# Patient Record
Sex: MALE | Race: WHITE | Employment: FULL TIME | URBAN - METROPOLITAN AREA
[De-identification: names, ages, dates, MRNs, and addresses within clinical notes are randomized per-mention and may not be internally consistent; named-entity substitution may affect disease eponyms.]

---

## 2017-02-10 ENCOUNTER — OFFICE VISIT (OUTPATIENT)
Dept: FAMILY MEDICINE | Facility: CLINIC | Age: 62
End: 2017-02-10
Payer: COMMERCIAL

## 2017-02-10 VITALS
BODY MASS INDEX: 23.7 KG/M2 | HEART RATE: 53 BPM | TEMPERATURE: 98.3 F | DIASTOLIC BLOOD PRESSURE: 91 MMHG | WEIGHT: 175 LBS | SYSTOLIC BLOOD PRESSURE: 138 MMHG | HEIGHT: 72 IN | OXYGEN SATURATION: 99 %

## 2017-02-10 DIAGNOSIS — E78.5 HYPERLIPIDEMIA, UNSPECIFIED HYPERLIPIDEMIA TYPE: ICD-10-CM

## 2017-02-10 DIAGNOSIS — Z12.5 SCREENING FOR PROSTATE CANCER: ICD-10-CM

## 2017-02-10 DIAGNOSIS — Z01.818 PREOP GENERAL PHYSICAL EXAM: Primary | ICD-10-CM

## 2017-02-10 DIAGNOSIS — K92.2 LOWER GI BLEED: ICD-10-CM

## 2017-02-10 DIAGNOSIS — Z11.59 NEED FOR HEPATITIS C SCREENING TEST: ICD-10-CM

## 2017-02-10 DIAGNOSIS — I25.10 CORONARY ARTERY DISEASE INVOLVING NATIVE HEART WITHOUT ANGINA PECTORIS, UNSPECIFIED VESSEL OR LESION TYPE: ICD-10-CM

## 2017-02-10 LAB
ALBUMIN SERPL-MCNC: 4.1 G/DL (ref 3.4–5)
ALP SERPL-CCNC: 80 U/L (ref 40–150)
ALT SERPL W P-5'-P-CCNC: 34 U/L (ref 0–70)
ANION GAP SERPL CALCULATED.3IONS-SCNC: 4 MMOL/L (ref 3–14)
AST SERPL W P-5'-P-CCNC: 35 U/L (ref 0–45)
BASOPHILS # BLD AUTO: 0 10E9/L (ref 0–0.2)
BASOPHILS NFR BLD AUTO: 0.8 %
BILIRUB SERPL-MCNC: 0.7 MG/DL (ref 0.2–1.3)
BUN SERPL-MCNC: 11 MG/DL (ref 7–30)
CALCIUM SERPL-MCNC: 9.3 MG/DL (ref 8.5–10.1)
CHLORIDE SERPL-SCNC: 105 MMOL/L (ref 94–109)
CHOLEST SERPL-MCNC: 152 MG/DL
CO2 SERPL-SCNC: 32 MMOL/L (ref 20–32)
CREAT SERPL-MCNC: 0.86 MG/DL (ref 0.66–1.25)
DIFFERENTIAL METHOD BLD: NORMAL
EOSINOPHIL # BLD AUTO: 0.3 10E9/L (ref 0–0.7)
EOSINOPHIL NFR BLD AUTO: 7 %
ERYTHROCYTE [DISTWIDTH] IN BLOOD BY AUTOMATED COUNT: 14.1 % (ref 10–15)
GFR SERPL CREATININE-BSD FRML MDRD: NORMAL ML/MIN/1.7M2
GLUCOSE SERPL-MCNC: 91 MG/DL (ref 70–99)
HCT VFR BLD AUTO: 44.2 % (ref 40–53)
HDLC SERPL-MCNC: 63 MG/DL
HGB BLD-MCNC: 15 G/DL (ref 13.3–17.7)
LDLC SERPL CALC-MCNC: 75 MG/DL
LYMPHOCYTES # BLD AUTO: 0.9 10E9/L (ref 0.8–5.3)
LYMPHOCYTES NFR BLD AUTO: 24.1 %
MCH RBC QN AUTO: 31 PG (ref 26.5–33)
MCHC RBC AUTO-ENTMCNC: 33.9 G/DL (ref 31.5–36.5)
MCV RBC AUTO: 91 FL (ref 78–100)
MONOCYTES # BLD AUTO: 0.3 10E9/L (ref 0–1.3)
MONOCYTES NFR BLD AUTO: 8 %
NEUTROPHILS # BLD AUTO: 2.2 10E9/L (ref 1.6–8.3)
NEUTROPHILS NFR BLD AUTO: 60.1 %
NONHDLC SERPL-MCNC: 89 MG/DL
PLATELET # BLD AUTO: 176 10E9/L (ref 150–450)
POTASSIUM SERPL-SCNC: 4.7 MMOL/L (ref 3.4–5.3)
PROT SERPL-MCNC: 7.4 G/DL (ref 6.8–8.8)
PSA SERPL-ACNC: 1.11 UG/L (ref 0–4)
RBC # BLD AUTO: 4.84 10E12/L (ref 4.4–5.9)
SODIUM SERPL-SCNC: 141 MMOL/L (ref 133–144)
TRIGL SERPL-MCNC: 72 MG/DL
WBC # BLD AUTO: 3.7 10E9/L (ref 4–11)

## 2017-02-10 PROCEDURE — 99213 OFFICE O/P EST LOW 20 MIN: CPT | Performed by: INTERNAL MEDICINE

## 2017-02-10 PROCEDURE — 80061 LIPID PANEL: CPT | Performed by: INTERNAL MEDICINE

## 2017-02-10 PROCEDURE — 80053 COMPREHEN METABOLIC PANEL: CPT | Performed by: INTERNAL MEDICINE

## 2017-02-10 PROCEDURE — G0103 PSA SCREENING: HCPCS | Performed by: INTERNAL MEDICINE

## 2017-02-10 PROCEDURE — 93000 ELECTROCARDIOGRAM COMPLETE: CPT | Performed by: INTERNAL MEDICINE

## 2017-02-10 PROCEDURE — 85027 COMPLETE CBC AUTOMATED: CPT | Performed by: INTERNAL MEDICINE

## 2017-02-10 PROCEDURE — 36415 COLL VENOUS BLD VENIPUNCTURE: CPT | Performed by: INTERNAL MEDICINE

## 2017-02-10 NOTE — PATIENT INSTRUCTIONS
Before Your Surgery      Call your surgeon if there is any change in your health. This includes signs of a cold or flu (such as a sore throat, runny nose, cough, rash or fever).    Do not smoke, drink alcohol or take over the counter medicine (unless your surgeon or primary care doctor tells you to) for the 24 hours before and after surgery.    If you take prescribed drugs: Follow your doctor s orders about which medicines to take and which to stop until after surgery.    Eating and drinking prior to surgery: follow the instructions from your surgeon    Take a shower or bath the night before surgery. Use the soap your surgeon gave you to gently clean your skin. If you do not have soap from your surgeon, use your regular soap. Do not shave or scrub the surgery site.  Wear clean pajamas and have clean sheets on your bed.     (Z01.818) Preop general physical exam  (primary encounter diagnosis)  Comment: you are medically optimized for your surgery pending EKG and labs.  I would recommend holding aspirin for 1 week prior to surgery.  Continue all other medications as you normally would  Plan: CBC with platelets, Comprehensive metabolic         panel, EKG 12-lead complete w/read - Clinics            (I25.10) Coronary artery disease involving native heart without angina pectoris, unspecified vessel or lesion type  Comment: stable - follow up in cardiology with Dr. Perez  Plan:     (E78.5) Hyperlipidemia, unspecified hyperlipidemia type  Comment: Check fasting lipid panel when you are able  Plan: Lipid panel reflex to direct LDL            (K92.2) Lower GI bleed  Comment: No issues - hold aspirin for 1 week prior to surgery  Plan:     (Z12.5) Screening for prostate cancer  Comment:   Plan: Prostate spec antigen screen

## 2017-02-10 NOTE — MR AVS SNAPSHOT
After Visit Summary   2/10/2017    Odessa Elliott    MRN: [id number]           Patient Information     Date Of Birth          1955        Visit Information        Provider Department      2/10/2017 8:00 AM Mikie Lazaro MD [facility] [place]        Today's Diagnoses     Preop general physical exam    -  1     Coronary artery disease involving native heart without angina pectoris, unspecified vessel or lesion type         Hyperlipidemia, unspecified hyperlipidemia type         Lower GI bleed         Screening for prostate cancer           Care Instructions      Before Your Surgery      Call your surgeon if there is any change in your health. This includes signs of a cold or flu (such as a sore throat, runny nose, cough, rash or fever).    Do not smoke, drink alcohol or take over the counter medicine (unless your surgeon or primary care doctor tells you to) for the 24 hours before and after surgery.    If you take prescribed drugs: Follow your doctor s orders about which medicines to take and which to stop until after surgery.    Eating and drinking prior to surgery: follow the instructions from your surgeon    Take a shower or bath the night before surgery. Use the soap your surgeon gave you to gently clean your skin. If you do not have soap from your surgeon, use your regular soap. Do not shave or scrub the surgery site.  Wear clean pajamas and have clean sheets on your bed.     (Z01.818) Preop general physical exam  (primary encounter diagnosis)  Comment: you are medically optimized for your surgery pending EKG and labs.  I would recommend holding aspirin for 1 week prior to surgery.  Continue all other medications as you normally would  Plan: CBC with platelets, Comprehensive metabolic         panel, EKG 12-lead complete w/read - Clinics            (I25.10) Coronary artery disease involving native heart without angina pectoris, unspecified vessel or lesion type  Comment: stable -  "follow up in cardiology with Dr. Perez  Plan:     (E78.5) Hyperlipidemia, unspecified hyperlipidemia type  Comment: Check fasting lipid panel when you are able  Plan: Lipid panel reflex to direct LDL            (K92.2) Lower GI bleed  Comment: No issues - hold aspirin for 1 week prior to surgery  Plan:     (Z12.5) Screening for prostate cancer  Comment:   Plan: Prostate spec antigen screen                    Follow-ups after your visit        Who to contact     If you have questions or need follow up information about today's clinic visit or your schedule please contact [facility] directly at [phone].  Normal or non-critical lab and imaging results will be communicated to you by Lifesumhart, letter or phone within 4 business days after the clinic has received the results. If you do not hear from us within 7 days, please contact the clinic through avocadostoret or phone. If you have a critical or abnormal lab result, we will notify you by phone as soon as possible.  Submit refill requests through [employer] or call your pharmacy and they will forward the refill request to us. Please allow 3 business days for your refill to be completed.          Additional Information About Your Visit        MyChart Information     [employer] gives you secure access to your electronic health record. If you see a primary care provider, you can also send messages to your care team and make appointments. If you have questions, please call your primary care clinic.  If you do not have a primary care provider, please call [phone] and they will assist you.        Care EveryWhere ID     This is your Care EveryWhere ID. This could be used by other organizations to access your [facility] medical records  [id number]        Your Vitals Were     Pulse Temperature Height BMI (Body Mass Index) Pulse Oximetry       53 98.3  F (36.8  C) (Oral) 5' 11.5\" (1.816 m) 24.07 kg/m2 99%        Blood Pressure from Last 3 Encounters:   02/10/17 138/91 "   07/11/16 128/88   07/15/15 134/89    Weight from Last 3 Encounters:   02/10/17 175 lb (79.379 kg)   07/11/16 173 lb 14.4 oz (78.881 kg)   07/15/15 174 lb 8 oz (79.153 kg)              We Performed the Following     CBC with platelets     Comprehensive metabolic panel     EKG 12-lead complete w/read - Clinics     Lipid panel reflex to direct LDL     Prostate spec antigen screen        Primary Care Provider Office Phone # Fax #    Mikie Lazaro [phone]       [facility] [address]   [place] [address]        Thank you!     Thank you for choosing [facility]  for your care. Our goal is always to provide you with excellent care. Hearing back from our patients is one way we can continue to improve our services. Please take a few minutes to complete the written survey that you may receive in the mail after your visit with us. Thank you!             Your Updated Medication List - Protect others around you: Learn how to safely use, store and throw away your medicines at www.disposemymeds.org.          This list is accurate as of: 2/10/17  8:12 AM.  Always use your most recent med list.                   Brand Name Dispense Instructions for use    aspirin 81 MG tablet      Take 81 mg by mouth daily       atorvastatin 80 MG tablet    LIPITOR    90 tablet    Take 1 tablet (80 mg) by mouth daily       magnesium 250 MG tablet      Take 1 tablet by mouth daily       metoprolol 25 MG 24 hr tablet    TOPROL-XL    45 tablet    Take 0.5 tablets (12.5 mg) by mouth daily       MULTI VITAMIN DAILY PO          Vitamin D (Cholecalciferol) 1000 UNITS Tabs      Take 2,000 Units by mouth

## 2017-02-10 NOTE — PROGRESS NOTES
[place] [address] [address] [facility] [address]  [phone]  Dept: [phone]    PRE-OP EVALUATION:  Today's date: 2/10/2017    Odessa Elliott (: 1955) presents for pre-operative evaluation assessment as requested by Dr. Briscoe.  He requires evaluation and anesthesia risk assessment prior to undergoing surgery/procedure for treatment of Pinky finger .  Proposed procedure:     Date of Surgery/ Procedure: 3/6/17  Time of Surgery/ Procedure: 6a  Hospital/Surgical Facility: [facility]  [phone]  Primary Physician: Mikie Lazaro  Type of Anesthesia Anticipated: Local with MAC    Patient has a Health Care Directive or Living Will:  NO    1. yes - Do you have a history of heart attack, stroke, stent, bypass or surgery on an artery in the head, neck, heart or legs? - history of cabg   2. NO - Do you ever have any pain or discomfort in your chest?  3. NO - Do you have a history of  Heart Failure?  4. NO - Are you troubled by shortness of breath when: walking on the level, up a slight hill or at night?  5. NO - Do you currently have a cold, bronchitis or other respiratory infection?  6. NO - Do you have a cough, shortness of breath or wheezing?  7. NO - Do you sometimes get pains in the calves of your legs when you walk?  8. NO - Do you or anyone in your family have previous history of blood clots?  9. NO - Do you or does anyone in your family have a serious bleeding problem such as prolonged bleeding following surgeries or cuts?  10. NO - Have you ever had problems with anemia or been told to take iron pills?  11. NO - Have you had any abnormal blood loss such as black, tarry or bloody stools, or abnormal vaginal bleeding?  12. NO - Have you ever had a blood transfusion?  13. NO - Have you or any of your relatives ever had problems with anesthesia?  14. NO - Do you have sleep apnea, excessive snoring or daytime drowsiness?  15. NO - Do you have any prosthetic heart valves?  16. NO -  Do you have prosthetic joints?  17. NO - Is there any chance that you may be pregnant?      HPI:                                                      Brief HPI related to upcoming procedure: Finger injury      See problem list for active medical problems.  Problems all longstanding and stable, except as noted/documented.  See ROS for pertinent symptoms related to these conditions.                                                                                                  .    MEDICAL HISTORY:                                                      Patient Active Problem List    Diagnosis Date Noted     Acromioclavicular sprain 06/09/2014     Priority: Medium     Lower GI bleed 09/30/2013     Priority: Medium     S/P CABG x 5 09/27/2013     Priority: Medium     Coronary artery disease 09/23/2013     Priority: Medium     5 v. CABG 9/13       Advanced directives, counseling/discussion 08/31/2012     Priority: Medium     Discussed advance care planning with patient; information given to patient to review. 8/31/2012   GARRET Salmon  9/5/12 Mailed pt informational letter regarding the [employer] for the development of an Advance Care Directive. Chayito Sheridan RN               Hearing loss 11/29/2007     Priority: Medium     Problem list name updated by automated process. Provider to review       Hyperlipidemia 11/29/2007     Priority: Medium     Problem list name updated by automated process. Provider to review        Past Medical History   Diagnosis Date     Chest pain      Hyperlipidaemia      Coronary artery disease      s/p 5v CABG     Urinary retention      Shortness of breath      Past Surgical History   Procedure Laterality Date     No history of surgery       Orthopedic surgery       cortisone shot in back for pain     Bypass graft artery coronary  9/23/2013     Procedure: BYPASS GRAFT ARTERY CORONARY;  CORONARY ARTERY BYPASS GRAFT X 5  WITH ENDOSCOPIC VEIN HARVESTING (LAD, PDA, SIMRAN, OM1 AND OM2  ");  Surgeon: Kwadwo Canales MD;  Location:  OR     Coronary artery bypass  2013     LIMA =LAD,svg=PDA,Svg=PLRca, Svg=Om1,Svg=OM2     Current Outpatient Prescriptions   Medication Sig Dispense Refill     Vitamin D, Cholecalciferol, 1000 UNITS TABS Take 2,000 Units by mouth       magnesium 250 MG tablet Take 1 tablet by mouth daily       metoprolol (TOPROL-XL) 25 MG 24 hr tablet Take 0.5 tablets (12.5 mg) by mouth daily 45 tablet 3     atorvastatin (LIPITOR) 80 MG tablet Take 1 tablet (80 mg) by mouth daily 90 tablet 2     Multiple Vitamin (MULTI VITAMIN DAILY PO)        aspirin 81 MG tablet Take 81 mg by mouth daily       OTC products: None, except as noted above    No Known Allergies   Latex Allergy: NO    Social History   Substance Use Topics     Smoking status: Never Smoker      Smokeless tobacco: Never Used     Alcohol Use: 3.6 oz/week     6 Cans of beer per week      Comment: 4 glasss wine per week     History   Drug Use No       REVIEW OF SYSTEMS:                                                    C: NEGATIVE for fever, chills, change in weight  E/M: NEGATIVE for ear, mouth and throat problems  R: NEGATIVE for significant cough or SOB  CV: NEGATIVE for chest pain, palpitations or peripheral edema    EXAM:                                                    /91 mmHg  Pulse 53  Temp(Src) 98.3  F (36.8  C) (Oral)  Ht 5' 11.5\" (1.816 m)  Wt 175 lb (79.379 kg)  BMI 24.07 kg/m2  SpO2 99%    GENERAL APPEARANCE: healthy, alert and no distress     EYES: EOMI, - PERRL     HENT: ear canals and TM's normal and nose and mouth without ulcers or lesions     NECK: no adenopathy, no asymmetry, masses, or scars and thyroid normal to palpation     RESP: lungs clear to auscultation - no rales, rhonchi or wheezes     CV: regular rates and rhythm, normal S1 S2, no S3 or S4 and no murmur, click or rub -     ABDOMEN:  soft, nontender, no HSM or masses and bowel sounds normal     MS: extremities normal- no gross deformities " noted, no evidence of inflammation in joints, FROM in all extremities.-= contracture noted     SKIN: no suspicious lesions or rashes     NEURO: Normal strength and tone, sensory exam grossly normal, mentation intact and speech normal     PSYCH: mentation appears normal. and affect normal/bright     LYMPHATICS: No axillary, cervical, inguinal, or supraclavicular nodes    DIAGNOSTICS:                                                      EKG: appears normal, NSR, normal axis, normal intervals, no acute ST/T changes c/w ischemia, no LVH by voltage criteria, unchanged from previous tracings  Labs Resulted Today:   Results for orders placed or performed in visit on 02/10/17   CBC with platelets   Result Value Ref Range    WBC 3.7 (L) 4.0 - 11.0 10e9/L    RBC Count 4.84 4.4 - 5.9 10e12/L    Hemoglobin 15.0 13.3 - 17.7 g/dL    Hematocrit 44.2 40.0 - 53.0 %    MCV 91 78 - 100 fl    MCH 31.0 26.5 - 33.0 pg    MCHC 33.9 31.5 - 36.5 g/dL    RDW 14.1 10.0 - 15.0 %    Platelet Count 176 150 - 450 10e9/L   Lipid panel reflex to direct LDL   Result Value Ref Range    Cholesterol 152 <200 mg/dL    Triglycerides 72 <150 mg/dL    HDL Cholesterol 63 >39 mg/dL    LDL Cholesterol Calculated 75 <100 mg/dL    Non HDL Cholesterol 89 <130 mg/dL   Comprehensive metabolic panel   Result Value Ref Range    Sodium 141 133 - 144 mmol/L    Potassium 4.7 3.4 - 5.3 mmol/L    Chloride 105 94 - 109 mmol/L    Carbon Dioxide 32 20 - 32 mmol/L    Anion Gap 4 3 - 14 mmol/L    Glucose 91 70 - 99 mg/dL    Urea Nitrogen 11 7 - 30 mg/dL    Creatinine 0.86 0.66 - 1.25 mg/dL    GFR Estimate >90  Non  GFR Calc   >60 mL/min/1.7m2    GFR Estimate If Black >90   GFR Calc   >60 mL/min/1.7m2    Calcium 9.3 8.5 - 10.1 mg/dL    Bilirubin Total 0.7 0.2 - 1.3 mg/dL    Albumin 4.1 3.4 - 5.0 g/dL    Protein Total 7.4 6.8 - 8.8 g/dL    Alkaline Phosphatase 80 40 - 150 U/L    ALT 34 0 - 70 U/L    AST 35 0 - 45 U/L   Prostate spec antigen  screen   Result Value Ref Range    PSA 1.11 0 - 4 ug/L   Differential   Result Value Ref Range    Diff Method Automated Method     % Neutrophils 60.1 %    % Lymphocytes 24.1 %    % Monocytes 8.0 %    % Eosinophils 7.0 %    % Basophils 0.8 %    Absolute Neutrophil 2.2 1.6 - 8.3 10e9/L    Absolute Lymphocytes 0.9 0.8 - 5.3 10e9/L    Absolute Monocytes 0.3 0.0 - 1.3 10e9/L    Absolute Eosinophils 0.3 0.0 - 0.7 10e9/L    Absolute Basophils 0.0 0.0 - 0.2 10e9/L     Labs Drawn and in Process:   Unresulted Labs Ordered in the Past 30 Days of this Admission     No orders found from 12/13/2016 to 2/11/2017.          Recent Labs   Lab Test  07/15/15   0947  06/04/14   1259  06/02/14   0817  10/02/13   0632   10/01/13   0750  09/30/13   1200   09/25/13   0515   09/23/13   2045   09/19/13   1350   HGB  15.7   --    --   10.0*   < >  10.2*  11.2*   < >  9.3*   < >  13.6   < >   --    PLT  201   --    --    --    --   295  331   < >  99*   < >  147*   < >   --    INR   --    --    --    --    --    --   1.06   --   1.22*   < >  1.23*   < >   --    NA  139   --   140   --    --    --   134   < >  136   < >  133   < >   --    POTASSIUM  4.7  5.0  5.4*   --    --    --   4.4   < >  4.0   < >  5.1   < >   --    CR  0.87   --   0.90   --    --    --   0.86   < >  0.71   < >  0.75   --    --    A1C   --    --    --    --    --    --    --    --    --    --   4.6   --   4.7    < > = values in this interval not displayed.        IMPRESSION:                                                    Reason for surgery/procedure: Dupuytren contracture  Diagnosis/reason for consult: Pre-operative evaluation     The proposed surgical procedure is considered INTERMEDIATE risk.    REVISED CARDIAC RISK INDEX  The patient has the following serious cardiovascular risks for perioperative complications such as (MI, PE, VFib and 3  AV Block):  No serious cardiac risks  INTERPRETATION: 0 risks: Class I (very low risk - 0.4% complication rate)    The patient  has the following additional risks for perioperative complications:  No identified additional risks    No diagnosis found.    RECOMMENDATIONS:                                                      (Z01.818) Preop general physical exam  (primary encounter diagnosis)  Comment: you are medically optimized for your surgery pending EKG and labs.  I would recommend holding aspirin for 1 week prior to surgery.  Continue all other medications as you normally would  Plan: CBC with platelets, Comprehensive metabolic         panel, EKG 12-lead complete w/read - Clinics            (I25.10) Coronary artery disease involving native heart without angina pectoris, unspecified vessel or lesion type  Comment: stable - follow up in cardiology with Dr. Perez  Plan:     (E78.5) Hyperlipidemia, unspecified hyperlipidemia type  Comment: Check fasting lipid panel when you are able  Plan: Lipid panel reflex to direct LDL            (K92.2) Lower GI bleed  Comment: No issues - hold aspirin for 1 week prior to surgery  Plan:     (Z12.5) Screening for prostate cancer  Comment:   Plan: Prostate spec antigen screen                       Signed Electronically by: Mikie Lazaro MD, MD    Copy of this evaluation report is provided to requesting physician.    [facility] Preop Guidelines

## 2017-02-10 NOTE — NURSING NOTE
"Chief Complaint   Patient presents with     Pre-Op Exam       Initial /91 mmHg  Pulse 53  Temp(Src) 98.3  F (36.8  C) (Oral)  Ht 5' 11.5\" (1.816 m)  Wt 175 lb (79.379 kg)  BMI 24.07 kg/m2  SpO2 99% Estimated body mass index is 24.07 kg/(m^2) as calculated from the following:    Height as of this encounter: 5' 11.5\" (1.816 m).    Weight as of this encounter: 175 lb (79.379 kg).  Medication Reconciliation: complete   Anna BLACKMAN CMA      "

## 2017-02-12 NOTE — PROGRESS NOTES
Vito Saxena,    I had the opportunity to review your recent labs and a summary of your labs reads as follows:    Your complete blood counts show no sign of anemia, and platelets, but again there was a low total white blood cell count.  However, the differential of your white blood cell count shows stable counts for all cell lines which we can continue to monitor  Your comprehensive metabolic panel showed normal renal function, normal liver function, and normal fasting blood glucose indicating no evidence of diabetes mellitus.  Your fasting lipid panel show  - normal HDL (good) cholesterol -as your goal is greater than 40  - low LDL (bad) cholesterol as your goal is less than 100 - continue statin   - normal triglyceride levels  Your PSA level is also stable indicating no evidence of prostate cancer    Congratulaions on your excellent results       Sincerely,  Mikie Lazaro MD

## 2017-03-06 ENCOUNTER — TELEPHONE (OUTPATIENT)
Dept: FAMILY MEDICINE | Facility: CLINIC | Age: 62
End: 2017-03-06

## 2017-03-06 NOTE — TELEPHONE ENCOUNTER
Reason for Call:  Other fax    Detailed comments: pre-op needs to be faxed ASAP to [facility]  Fax # [phone] Attn:  Lisset  Pt is being prepped right now for surgery.    Phone Number Patient can be reached at: [phone]    Best Time: anytime    Can we leave a detailed message on this number? YES    Call taken on 3/6/2017 at 12:35 PM by Yasmine Philip

## 2017-07-05 DIAGNOSIS — I25.10 CORONARY ARTERY DISEASE INVOLVING NATIVE CORONARY ARTERY OF NATIVE HEART WITHOUT ANGINA PECTORIS: ICD-10-CM

## 2017-07-05 RX ORDER — ATORVASTATIN CALCIUM 80 MG/1
80 TABLET, FILM COATED ORAL DAILY
Qty: 90 TABLET | Refills: 0 | Status: SHIPPED | OUTPATIENT
Start: 2017-07-05 | End: 2017-10-12

## 2017-08-11 ENCOUNTER — OFFICE VISIT (OUTPATIENT)
Dept: CARDIOLOGY | Facility: CLINIC | Age: 62
End: 2017-08-11
Attending: INTERNAL MEDICINE
Payer: COMMERCIAL

## 2017-08-11 VITALS
DIASTOLIC BLOOD PRESSURE: 85 MMHG | WEIGHT: 176 LBS | SYSTOLIC BLOOD PRESSURE: 126 MMHG | BODY MASS INDEX: 24.64 KG/M2 | HEART RATE: 51 BPM | HEIGHT: 71 IN

## 2017-08-11 DIAGNOSIS — Z95.1 S/P CABG X 5: Primary | ICD-10-CM

## 2017-08-11 DIAGNOSIS — I25.10 CORONARY ARTERY DISEASE INVOLVING NATIVE CORONARY ARTERY OF NATIVE HEART WITHOUT ANGINA PECTORIS: ICD-10-CM

## 2017-08-11 PROCEDURE — 99213 OFFICE O/P EST LOW 20 MIN: CPT | Performed by: INTERNAL MEDICINE

## 2017-08-11 NOTE — MR AVS SNAPSHOT
After Visit Summary   8/11/2017    Odessa Elliott    MRN: [id number]           Patient Information     Date Of Birth          1955        Visit Information        Provider Department      8/11/2017 7:45 AM Armani Perez MD [facility]        Today's Diagnoses     S/P CABG x 5    -  1    Coronary artery disease involving native coronary artery of native heart without angina pectoris           Follow-ups after your visit        Additional Services     Follow-Up with Cardiologist                 Future tests that were ordered for you today     Open Future Orders        Priority Expected Expires Ordered    Follow-Up with Cardiologist Routine 8/11/2018 12/24/2018 8/11/2017            Who to contact     If you have questions or need follow up information about today's clinic visit or your schedule please contact [facility] directly at [phone].  Normal or non-critical lab and imaging results will be communicated to you by [email or web address], letter or phone within 4 business days after the clinic has received the results. If you do not hear from us within 7 days, please contact the clinic through [email or web address] or phone. If you have a critical or abnormal lab result, we will notify you by phone as soon as possible.  Submit refill requests through ikaSystems or call your pharmacy and they will forward the refill request to us. Please allow 3 business days for your refill to be completed.          Additional Information About Your Visit        MyChart Information     ikaSystems gives you secure access to your electronic health record. If you see a primary care provider, you can also send messages to your care team and make appointments. If you have questions, please call your primary care clinic.  If you do not have a primary care provider, please call [phone] and they will assist you.        Care EveryWhere ID     This is your Care  "EveryWhere ID. This could be used by other organizations to access your [place] medical records  [id number]        Your Vitals Were     Pulse Height BMI (Body Mass Index)             51 1.803 m (5' 11\") 24.55 kg/m2          Blood Pressure from Last 3 Encounters:   08/11/17 126/85   02/10/17 (!) 138/91   07/11/16 128/88    Weight from Last 3 Encounters:   08/11/17 79.8 kg (176 lb)   02/10/17 79.4 kg (175 lb)   07/11/16 78.9 kg (173 lb 14.4 oz)              We Performed the Following     Follow-Up with Cardiologist          Today's Medication Changes          These changes are accurate as of: 8/11/17  8:17 AM.  If you have any questions, ask your nurse or doctor.               Stop taking these medicines if you haven't already. Please contact your care team if you have questions.     magnesium 250 MG tablet   Stopped by:  Armani Perez MD                    Primary Care Provider Office Phone # Fax #    Mikie Lazaro [phone] [address]  [facility] [address]        Equal Access to Services     [facility] : Hadii martir Banda, waivonneda abdifatah, qaellista kaalmada jose, antwon christian. So [facility] [phone].    ATENCIÓN: Si habla español, tiene a rivero disposición servicios gratuitos de asistencia lingüística. [facility] [phone].    We comply with applicable federal civil rights laws and [place] laws. We do not discriminate on the basis of race, color, national origin, age, disability sex, sexual orientation or gender identity.            Thank you!     Thank you for choosing [facility] [place]  for your care. Our goal is always to provide you with excellent care. Hearing back from our patients is one way we can continue to improve our services. Please take a few minutes to complete the written survey that you may receive in the mail after your visit with us. Thank you!             Your Updated " Medication List - Protect others around you: Learn how to safely use, store and throw away your medicines at www.disposemymeds.org.          This list is accurate as of: 8/11/17  8:17 AM.  Always use your most recent med list.                   Brand Name Dispense Instructions for use Diagnosis    aspirin 81 MG tablet      Take 81 mg by mouth daily        atorvastatin 80 MG tablet    LIPITOR    90 tablet    Take 1 tablet (80 mg) by mouth daily    Coronary artery disease involving native coronary artery of native heart without angina pectoris       CALCIUM 600 PO      Take 1 tablet by mouth daily        CoQ-10 100 MG Caps      Take 1 tablet by mouth daily        metoprolol 25 MG 24 hr tablet    TOPROL-XL    45 tablet    Take 0.5 tablets (12.5 mg) by mouth daily    Coronary artery disease involving native coronary artery of native heart without angina pectoris       MULTI VITAMIN DAILY PO           Vitamin D (Cholecalciferol) 1000 UNITS Tabs      Take 1,000 Units by mouth

## 2017-08-11 NOTE — PROGRESS NOTES
HISTORY OF PRESENT ILLNESS:  The patient is a very pleasant 62-year-old gentleman with known coronary artery disease and hyperlipidemia.  He underwent coronary artery bypass grafting x5 in 09/2013.  At that time he had LIMA to the LAD, vein graft to the right posterior descending artery, vein graft to the posterior lateral segment, vein graft to obtuse marginal branch 1 and a vein graft to obtuse marginal branch 2.  He has done well since the surgery.  I last saw him 1 year ago for followup.     He continues to stay active and denies any overwhelming cardiopulmonary symptoms. He denies chest pain or exertional shortness of breath.  He also denies any heart failure symptoms, including orthopnea, PND or lower extremity edema.      IMPRESSION AND PLAN:   1.  Coronary artery disease status post CABG x5 in 09/2013.   2.  Hyperlipidemia.      He continues to do well from coronary artery disease standpoint. No chest pains or exertional shortness of breath over the past year. He remains on a good medical program, including aspirin, metoprolol and Lipitor. His most recent lipid panel showed an LDL of 75. His other risk factors are well controlled.     I will see him in approximately a year for follow up but sooner if he develops any symptoms.  It was a pleasure seeing Mr. Elliott in the cardiovascular clinic this morning.         Armani Perez MD      Orders Placed This Encounter   Procedures     Follow-Up with Cardiologist       Orders Placed This Encounter   Medications     Vitamin D, Cholecalciferol, 1000 UNITS TABS     Sig: Take 2,000 Units by mouth     magnesium 250 MG tablet     Sig: Take 1 tablet by mouth daily     metoprolol (TOPROL-XL) 25 MG 24 hr tablet     Sig: Take 0.5 tablets (12.5 mg) by mouth daily     Dispense:  45 tablet     Refill:  3     atorvastatin (LIPITOR) 80 MG tablet     Sig: Take 1 tablet (80 mg) by mouth daily     Dispense:  90 tablet     Refill:  2       Medications Discontinued During This Encounter    Medication Reason     metoprolol (TOPROL-XL) 25 MG 24 hr tablet Reorder     atorvastatin (LIPITOR) 80 MG tablet Reorder         Encounter Diagnoses   Name Primary?     Coronary artery disease involving native coronary artery of native heart without angina pectoris Yes     S/P CABG (coronary artery bypass graft)      Mixed hyperlipidemia        CURRENT MEDICATIONS:  Current Outpatient Prescriptions   Medication Sig Dispense Refill     Vitamin D, Cholecalciferol, 1000 UNITS TABS Take 2,000 Units by mouth       magnesium 250 MG tablet Take 1 tablet by mouth daily       metoprolol (TOPROL-XL) 25 MG 24 hr tablet Take 0.5 tablets (12.5 mg) by mouth daily 45 tablet 3     atorvastatin (LIPITOR) 80 MG tablet Take 1 tablet (80 mg) by mouth daily 90 tablet 2     Multiple Vitamin (MULTI VITAMIN DAILY PO)        aspirin 81 MG tablet Take 81 mg by mouth daily         ALLERGIES   No Known Allergies    PAST MEDICAL HISTORY:  Past Medical History   Diagnosis Date     Chest pain      Hyperlipidaemia      Coronary artery disease      s/p 5v CABG     Urinary retention      Shortness of breath        PAST SURGICAL HISTORY:  Past Surgical History   Procedure Laterality Date     No history of surgery       Orthopedic surgery       cortisone shot in back for pain     Bypass graft artery coronary  9/23/2013     Procedure: BYPASS GRAFT ARTERY CORONARY;  CORONARY ARTERY BYPASS GRAFT X 5  WITH ENDOSCOPIC VEIN HARVESTING (LAD, PDA, SIMRAN, OM1 AND OM2 );  Surgeon: Kwadwo Canales MD;  Location:  OR     Coronary artery bypass  2013     LIMA =LAD,svg=PDA,Svg=PLRca, Svg=Om1,Svg=OM2       FAMILY HISTORY:  Family History   Problem Relation Age of Onset     Lipids Mother      C.A.D. No family hx of      Hypertension No family hx of      Cerebrovascular Accident No family hx of      Cancer Paternal Grandmother        SOCIAL HISTORY:  History     Social History     Marital Status:      Spouse Name: N/A     Number of Children: N/A     Years of  "Education: N/A     Social History Main Topics     Smoking status: Never Smoker      Smokeless tobacco: Never Used     Alcohol Use: 3.6 oz/week     6 Cans of beer per week      Comment: 4 glasss wine per week     Drug Use: No     Sexual Activity: Yes     Other Topics Concern     Caffeine Concern Yes     4 cups coffee daily     Sleep Concern No     Exercise Yes     daily walking, biking, golf      Seat Belt Yes     Parent/Sibling W/ Cabg, Mi Or Angioplasty Before 65f 55m? No     Social History Narrative    , 1 daughter    Employed for [employer]       Review of Systems:  Skin:  Negative       Eyes:  Positive for glasses;contacts    ENT:  Negative      Respiratory:  Negative       Cardiovascular:  Negative  (sharp chest pain occ)    Gastroenterology: Negative      Genitourinary:  Positive for nocturia    Musculoskeletal:  Negative      Neurologic:  Negative      Psychiatric:  Negative      Heme/Lymph/Imm:  Negative      Endocrine:  Negative        Physical Exam:  Vitals: /88 mmHg  Pulse 62  Ht 1.816 m (5' 11.5\")  Wt 78.881 kg (173 lb 14.4 oz)  BMI 23.92 kg/m2    Constitutional:  cooperative, alert and oriented, well developed, well nourished, in no acute distress        Skin:    surgical scars well-healed      Head:  normocephalic;no masses or lesions        Eyes:           ENT:  dentition good;no pallor or cyanosis        Neck:  carotid pulses are full and equal bilaterally        Chest:  clear to auscultation          Cardiac: regular rhythm, normal S1/S2, no S3 or S4, apical impulse not displaced, no murmurs, gallops or rubs                  Abdomen:  no masses;abdomen soft;non-tender        Vascular: pulses full and equal, no bruits auscultated                                        Extremities and Back:  no deformities, clubbing, cyanosis, erythema observed              Neurological:  no gross motor deficits              CC  Mikie Lazaro MD  [facility] " [facility]  [address] [place] [address]                  Orders Placed This Encounter   Procedures     Follow-Up with Cardiologist       Orders Placed This Encounter   Medications     Coenzyme Q10 (COQ-10) 100 MG CAPS     Sig: Take 1 tablet by mouth daily     Calcium Carbonate (CALCIUM 600 PO)     Sig: Take 1 tablet by mouth daily       Medications Discontinued During This Encounter   Medication Reason     magnesium 250 MG tablet Alternate therapy         Encounter Diagnoses   Name Primary?     Coronary artery disease involving native coronary artery of native heart without angina pectoris      S/P CABG x 5 Yes       CURRENT MEDICATIONS:  Current Outpatient Prescriptions   Medication Sig Dispense Refill     Coenzyme Q10 (COQ-10) 100 MG CAPS Take 1 tablet by mouth daily       Calcium Carbonate (CALCIUM 600 PO) Take 1 tablet by mouth daily       atorvastatin (LIPITOR) 80 MG tablet Take 1 tablet (80 mg) by mouth daily 90 tablet 0     Vitamin D, Cholecalciferol, 1000 UNITS TABS Take 1,000 Units by mouth        metoprolol (TOPROL-XL) 25 MG 24 hr tablet Take 0.5 tablets (12.5 mg) by mouth daily 45 tablet 3     Multiple Vitamin (MULTI VITAMIN DAILY PO)        aspirin 81 MG tablet Take 81 mg by mouth daily         ALLERGIES   No Known Allergies    PAST MEDICAL HISTORY:  Past Medical History:   Diagnosis Date     Chest pain      Coronary artery disease     s/p 5v CABG     Hyperlipidaemia      Shortness of breath      Urinary retention        PAST SURGICAL HISTORY:  Past Surgical History:   Procedure Laterality Date     BYPASS GRAFT ARTERY CORONARY  9/23/2013    Procedure: BYPASS GRAFT ARTERY CORONARY;  CORONARY ARTERY BYPASS GRAFT X 5  WITH ENDOSCOPIC VEIN HARVESTING (LAD, PDA, SIMRAN, OM1 AND OM2 );  Surgeon: Kwadwo Canales MD;  Location:  OR     CORONARY ARTERY BYPASS  2013    LIMA =LAD,svg=PDA,Svg=PLRca, Svg=Om1,Svg=OM2     NO HISTORY OF SURGERY       ORTHOPEDIC SURGERY      cortisone shot in back for pain  "      FAMILY HISTORY:  Family History   Problem Relation Age of Onset     Lipids Mother      CANCER Paternal Grandmother      C.A.D. No family hx of      Hypertension No family hx of      CEREBROVASCULAR DISEASE No family hx of        SOCIAL HISTORY:  Social History     Social History     Marital status:      Spouse name: N/A     Number of children: N/A     Years of education: N/A     Social History Main Topics     Smoking status: Never Smoker     Smokeless tobacco: Never Used     Alcohol use 3.6 oz/week     6 Cans of beer per week      Comment: 4 glasss wine per week     Drug use: No     Sexual activity: Yes     Other Topics Concern     Caffeine Concern Yes     4 cups coffee daily     Sleep Concern No     Exercise Yes     daily walking, biking, golf      Seat Belt Yes     Parent/Sibling W/ Cabg, Mi Or Angioplasty Before 65f 55m? No     Social History Narrative    , 1 daughter    Employed for [employer]       Review of Systems:  Skin:  Negative       Eyes:  Positive for glasses    ENT:  Negative      Respiratory:  Negative       Cardiovascular:  Negative      Gastroenterology: Negative      Genitourinary:  not assessed      Musculoskeletal:  Negative      Neurologic:  Negative      Psychiatric:  Negative      Heme/Lymph/Imm:  Negative      Endocrine:  Negative        Physical Exam:  Vitals: /85  Pulse 51  Ht 1.803 m (5' 11\")  Wt 79.8 kg (176 lb)  BMI 24.55 kg/m2    Constitutional:  cooperative, alert and oriented, well developed, well nourished, in no acute distress        Skin:    surgical scars well-healed      Head:  normocephalic;no masses or lesions        Eyes:           ENT:  dentition good;no pallor or cyanosis        Neck:  carotid pulses are full and equal bilaterally        Chest:  clear to auscultation          Cardiac: regular rhythm, normal S1/S2, no S3 or S4, apical impulse not displaced, no murmurs, gallops or rubs                  Abdomen:  no " masses;abdomen soft;non-tender        Vascular: pulses full and equal, no bruits auscultated                                        Extremities and Back:  no deformities, clubbing, cyanosis, erythema observed              Neurological:  no gross motor deficits                Armani Perez MD  [address]  [place] [address]

## 2017-08-11 NOTE — LETTER
8/11/2017    Mikie Lazaro MD  [address]  [facility] [address]    RE: Odessa Zaidintz       Dear Colleague,    I had the pleasure of seeing Odessa Elliott in the [facility].    HISTORY OF PRESENT ILLNESS:  The patient is a very pleasant 62-year-old gentleman with known coronary artery disease and hyperlipidemia.  He underwent coronary artery bypass grafting x5 in 09/2013.  At that time he had LIMA to the LAD, vein graft to the right posterior descending artery, vein graft to the posterior lateral segment, vein graft to obtuse marginal branch 1 and a vein graft to obtuse marginal branch 2.  He has done well since the surgery.  I last saw him 1 year ago for followup.     He continues to stay active and denies any overwhelming cardiopulmonary symptoms. He denies chest pain or exertional shortness of breath.  He also denies any heart failure symptoms, including orthopnea, PND or lower extremity edema.      IMPRESSION AND PLAN:   1.  Coronary artery disease status post CABG x5 in 09/2013.   2.  Hyperlipidemia.      He continues to do well from coronary artery disease standpoint. No chest pains or exertional shortness of breath over the past year. He remains on a good medical program, including aspirin, metoprolol and Lipitor. His most recent lipid panel showed an LDL of 75. His other risk factors are well controlled.     I will see him in approximately a year for follow up but sooner if he develops any symptoms.  It was a pleasure seeing Mr. Elliott in the cardiovascular clinic this morning.         Armani Perez MD      Orders Placed This Encounter   Procedures     Follow-Up with Cardiologist       Orders Placed This Encounter   Medications     Vitamin D, Cholecalciferol, 1000 UNITS TABS     Sig: Take 2,000 Units by mouth     magnesium 250 MG tablet     Sig: Take 1 tablet by mouth daily     metoprolol (TOPROL-XL) 25 MG 24 hr tablet     Sig: Take 0.5 tablets (12.5 mg) by mouth  daily     Dispense:  45 tablet     Refill:  3     atorvastatin (LIPITOR) 80 MG tablet     Sig: Take 1 tablet (80 mg) by mouth daily     Dispense:  90 tablet     Refill:  2       Medications Discontinued During This Encounter   Medication Reason     metoprolol (TOPROL-XL) 25 MG 24 hr tablet Reorder     atorvastatin (LIPITOR) 80 MG tablet Reorder         Encounter Diagnoses   Name Primary?     Coronary artery disease involving native coronary artery of native heart without angina pectoris Yes     S/P CABG (coronary artery bypass graft)      Mixed hyperlipidemia        CURRENT MEDICATIONS:  Current Outpatient Prescriptions   Medication Sig Dispense Refill     Vitamin D, Cholecalciferol, 1000 UNITS TABS Take 2,000 Units by mouth       magnesium 250 MG tablet Take 1 tablet by mouth daily       metoprolol (TOPROL-XL) 25 MG 24 hr tablet Take 0.5 tablets (12.5 mg) by mouth daily 45 tablet 3     atorvastatin (LIPITOR) 80 MG tablet Take 1 tablet (80 mg) by mouth daily 90 tablet 2     Multiple Vitamin (MULTI VITAMIN DAILY PO)        aspirin 81 MG tablet Take 81 mg by mouth daily         ALLERGIES   No Known Allergies    PAST MEDICAL HISTORY:  Past Medical History   Diagnosis Date     Chest pain      Hyperlipidaemia      Coronary artery disease      s/p 5v CABG     Urinary retention      Shortness of breath        PAST SURGICAL HISTORY:  Past Surgical History   Procedure Laterality Date     No history of surgery       Orthopedic surgery       cortisone shot in back for pain     Bypass graft artery coronary  9/23/2013     Procedure: BYPASS GRAFT ARTERY CORONARY;  CORONARY ARTERY BYPASS GRAFT X 5  WITH ENDOSCOPIC VEIN HARVESTING (LAD, PDA, SIMRAN, OM1 AND OM2 );  Surgeon: Kwadwo Canales MD;  Location:  OR     Coronary artery bypass  2013     LIMA =LAD,svg=PDA,Svg=PLRca, Svg=Om1,Svg=OM2       FAMILY HISTORY:  Family History   Problem Relation Age of Onset     Lipids Mother      C.A.D. No family hx of      Hypertension No family hx  "of      Cerebrovascular Accident No family hx of      Cancer Paternal Grandmother        SOCIAL HISTORY:  History     Social History     Marital Status:      Spouse Name: N/A     Number of Children: N/A     Years of Education: N/A     Social History Main Topics     Smoking status: Never Smoker      Smokeless tobacco: Never Used     Alcohol Use: 3.6 oz/week     6 Cans of beer per week      Comment: 4 glasss wine per week     Drug Use: No     Sexual Activity: Yes     Other Topics Concern     Caffeine Concern Yes     4 cups coffee daily     Sleep Concern No     Exercise Yes     daily walking, biking, golf      Seat Belt Yes     Parent/Sibling W/ Cabg, Mi Or Angioplasty Before 65f 55m? No     Social History Narrative    , 1 daughter    Employed for [employer]       Review of Systems:  Skin:  Negative       Eyes:  Positive for glasses;contacts    ENT:  Negative      Respiratory:  Negative       Cardiovascular:  Negative  (sharp chest pain occ)    Gastroenterology: Negative      Genitourinary:  Positive for nocturia    Musculoskeletal:  Negative      Neurologic:  Negative      Psychiatric:  Negative      Heme/Lymph/Imm:  Negative      Endocrine:  Negative        Physical Exam:  Vitals: /88 mmHg  Pulse 62  Ht 1.816 m (5' 11.5\")  Wt 78.881 kg (173 lb 14.4 oz)  BMI 23.92 kg/m2    Constitutional:  cooperative, alert and oriented, well developed, well nourished, in no acute distress        Skin:    surgical scars well-healed      Head:  normocephalic;no masses or lesions        Eyes:           ENT:  dentition good;no pallor or cyanosis        Neck:  carotid pulses are full and equal bilaterally        Chest:  clear to auscultation          Cardiac: regular rhythm, normal S1/S2, no S3 or S4, apical impulse not displaced, no murmurs, gallops or rubs                  Abdomen:  no masses;abdomen soft;non-tender        Vascular: pulses full and equal, no bruits auscultated          "                               Extremities and Back:  no deformities, clubbing, cyanosis, erythema observed              Neurological:  no gross motor deficits              CC  Mikie Lazaro MD  [facility]  [address]  [place] [address]                  Orders Placed This Encounter   Procedures     Follow-Up with Cardiologist       Orders Placed This Encounter   Medications     Coenzyme Q10 (COQ-10) 100 MG CAPS     Sig: Take 1 tablet by mouth daily     Calcium Carbonate (CALCIUM 600 PO)     Sig: Take 1 tablet by mouth daily       Medications Discontinued During This Encounter   Medication Reason     magnesium 250 MG tablet Alternate therapy         Encounter Diagnoses   Name Primary?     Coronary artery disease involving native coronary artery of native heart without angina pectoris      S/P CABG x 5 Yes       CURRENT MEDICATIONS:  Current Outpatient Prescriptions   Medication Sig Dispense Refill     Coenzyme Q10 (COQ-10) 100 MG CAPS Take 1 tablet by mouth daily       Calcium Carbonate (CALCIUM 600 PO) Take 1 tablet by mouth daily       atorvastatin (LIPITOR) 80 MG tablet Take 1 tablet (80 mg) by mouth daily 90 tablet 0     Vitamin D, Cholecalciferol, 1000 UNITS TABS Take 1,000 Units by mouth        metoprolol (TOPROL-XL) 25 MG 24 hr tablet Take 0.5 tablets (12.5 mg) by mouth daily 45 tablet 3     Multiple Vitamin (MULTI VITAMIN DAILY PO)        aspirin 81 MG tablet Take 81 mg by mouth daily         ALLERGIES   No Known Allergies    PAST MEDICAL HISTORY:  Past Medical History:   Diagnosis Date     Chest pain      Coronary artery disease     s/p 5v CABG     Hyperlipidaemia      Shortness of breath      Urinary retention        PAST SURGICAL HISTORY:  Past Surgical History:   Procedure Laterality Date     BYPASS GRAFT ARTERY CORONARY  9/23/2013    Procedure: BYPASS GRAFT ARTERY CORONARY;  CORONARY ARTERY BYPASS GRAFT X 5  WITH ENDOSCOPIC VEIN HARVESTING (LAD, PDA, SIMRAN, OM1 AND OM2 );   "Surgeon: Kwadwo Canales MD;  Location: [facility] OR     CORONARY ARTERY BYPASS  2013    LIMA =LAD,svg=PDA,Svg=PLRca, Svg=Om1,Svg=OM2     NO HISTORY OF SURGERY       ORTHOPEDIC SURGERY      cortisone shot in back for pain       FAMILY HISTORY:  Family History   Problem Relation Age of Onset     Lipids Mother      CANCER Paternal Grandmother      C.A.D. No family hx of      Hypertension No family hx of      CEREBROVASCULAR DISEASE No family hx of        SOCIAL HISTORY:  Social History     Social History     Marital status:      Spouse name: N/A     Number of children: N/A     Years of education: N/A     Social History Main Topics     Smoking status: Never Smoker     Smokeless tobacco: Never Used     Alcohol use 3.6 oz/week     6 Cans of beer per week      Comment: 4 glasss wine per week     Drug use: No     Sexual activity: Yes     Other Topics Concern     Caffeine Concern Yes     4 cups coffee daily     Sleep Concern No     Exercise Yes     daily walking, biking, golf      Seat Belt Yes     Parent/Sibling W/ Cabg, Mi Or Angioplasty Before 65f 55m? No     Social History Narrative    , 1 daughter    Employed for [employer]       Review of Systems:  Skin:  Negative       Eyes:  Positive for glasses    ENT:  Negative      Respiratory:  Negative       Cardiovascular:  Negative      Gastroenterology: Negative      Genitourinary:  not assessed      Musculoskeletal:  Negative      Neurologic:  Negative      Psychiatric:  Negative      Heme/Lymph/Imm:  Negative      Endocrine:  Negative        Physical Exam:  Vitals: /85  Pulse 51  Ht 1.803 m (5' 11\")  Wt 79.8 kg (176 lb)  BMI 24.55 kg/m2    Constitutional:  cooperative, alert and oriented, well developed, well nourished, in no acute distress        Skin:    surgical scars well-healed      Head:  normocephalic;no masses or lesions        Eyes:           ENT:  dentition good;no pallor or cyanosis        Neck:  carotid pulses are full " and equal bilaterally        Chest:  clear to auscultation          Cardiac: regular rhythm, normal S1/S2, no S3 or S4, apical impulse not displaced, no murmurs, gallops or rubs                  Abdomen:  no masses;abdomen soft;non-tender        Vascular: pulses full and equal, no bruits auscultated                                        Extremities and Back:  no deformities, clubbing, cyanosis, erythema observed              Neurological:  no gross motor deficits        Thank you for allowing me to participate in the care of your patient.    Sincerely,     Armani Perez MD     [facility]

## 2017-10-12 DIAGNOSIS — I25.10 CORONARY ARTERY DISEASE INVOLVING NATIVE CORONARY ARTERY OF NATIVE HEART WITHOUT ANGINA PECTORIS: ICD-10-CM

## 2017-10-12 RX ORDER — METOPROLOL SUCCINATE 25 MG/1
12.5 TABLET, EXTENDED RELEASE ORAL DAILY
Qty: 45 TABLET | Refills: 3 | Status: SHIPPED | OUTPATIENT
Start: 2017-10-12 | End: 2018-10-01

## 2017-10-12 RX ORDER — ATORVASTATIN CALCIUM 80 MG/1
80 TABLET, FILM COATED ORAL DAILY
Qty: 90 TABLET | Refills: 3 | Status: SHIPPED | OUTPATIENT
Start: 2017-10-12 | End: 2018-10-01

## 2018-07-16 ENCOUNTER — TELEPHONE (OUTPATIENT)
Dept: CARDIOLOGY | Facility: CLINIC | Age: 63
End: 2018-07-16

## 2018-07-16 DIAGNOSIS — E78.5 HYPERLIPIDEMIA LDL GOAL <100: Primary | ICD-10-CM

## 2018-07-16 NOTE — TELEPHONE ENCOUNTER
Patient's wife called and reported that patient had a recent screening at work for insurance and his total cholesterol figures were slightly elevated, but patient was not fasting (patient's wife does not have records). Patient's wife wondering if Dr. Perez would be willing to order FLP for patient. Patient's wife reports that patient is completely asymptomatic from a cardiac standpoint denying CP or SOB and would prefer not to have to schedule an OV solely for FLP recheck. RN advised patient's wife that RN would review with Dr. Perez and call her back with his recommendations. Rufus's wife acknowledged understanding and agreed with plan.       8/11/17 last OV note    IMPRESSION AND PLAN:   1.  Coronary artery disease status post CABG x5 in 09/2013.   2.  Hyperlipidemia.       He continues to do well from coronary artery disease standpoint. No chest pains or exertional shortness of breath over the past year. He remains on a good medical program, including aspirin, metoprolol and Lipitor. His most recent lipid panel showed an LDL of 75. His other risk factors are well controlled.      I will see him in approximately a year for follow up but sooner if he develops any symptoms.  It was a pleasure seeing Mr. Elliott in the cardiovascular clinic this morning.

## 2018-07-18 NOTE — TELEPHONE ENCOUNTER
RN called patient and left VM that Dr. Perez would like patient to have FLP prior to annual f/u. RN advised patient to call back with any questions. RN placed order FLP with annual f/u.

## 2018-07-24 NOTE — PROGRESS NOTES
"  SUBJECTIVE:   Odessa Elliott is a 63 year old male who presents to clinic today for the following health issues:      Gout    [facility]  CLINIC PROGRESS NOTE    Subjective:  Coronary artery disease involving native heart without angina pectoris, unspecified vessel or lesion type   He has continued to take atorvastatin 80 mg and metoprolol 12.5 mg daily.    Gout   Odessa Elliott was treated for Gout in left 2nd toe about 2 weeks ago.  He was originally given indomethacin but this did not help symptoms.  He was then given a prescription for colchicine.  This was his first gout attack.  He notes previous toe pain, but no history of gout.      Past medical history, medications, allergies, social history, family history reviewed and updated in [facility] as of 7/25/2018 .    ROS  CONSTITUTIONAL: no fatigue, no unexpected change in weight  SKIN: no worrisome rashes, no worrisome moles, no worrisome lesions  EYES: no acute vision problems or changes  ENT: no ear problems, no mouth problems, no throat problems  RESP: no significant cough, no shortness of breath  CV: no chest pain, no palpitations, no new or worsening peripheral edema  GI: no nausea, no vomiting, no constipation, no diarrhea  : no frequency, no dysuria, no hematuria  MS: as above   PSYCHIATRIC: no changes in mood or affect      Objective:  Vitals  /89  Pulse 62  Temp 97.7  F (36.5  C) (Oral)  Ht 5' 11\" (1.803 m)  Wt 170 lb (77.1 kg)  SpO2 100%  BMI 23.71 kg/m2  GEN: Alert Oriented x3 NAD  HEENT: Atraumatic, normocephalic, neck supple, no thyromegaly, negative cervical adenopathy  TM: TM bilaterally pearly and grey with normal light reflex  CV: RRR no murmurs or rubs  PULM: CTA no wheezes or crackles  ABD: Soft, nontender nondistended, no hepatosplenomegally  SKIN: No visible skin lesion or ulcerations  EXT: 2+ dorsal pedis pulses, no edema bilateral lower extremities   : prostate normal size, no nodules  NEURO: Gait and station deferred, " No focal neurologic deficits  PSYCH: Mood good, affect mood congruent    No images are attached to the encounter.    No results found for this or any previous visit (from the past 24 hour(s)).    Assessment/Plan:  Patient Instructions   (I25.10) Coronary artery disease involving native heart without angina pectoris, unspecified vessel or lesion type  (primary encounter diagnosis)  Comment: dong well - we will continue plan to follow up in cardiology   Plan:     (M10.9) Acute gouty arthritis  Comment: Noted that recent was due to gout.  I would recommend at this point that we check uric acid level and that you continue monitor your diet to avoid high protein and well as avoiding alcohol.  I would not recommend allopurinol after only one attack, however if the attack recurs then I would recommend considering allopurinol for prevention  Plan: Uric acid            (Z12.5) Screening for prostate cancer  Comment:   Plan: PSA, screen          Shingrix vaccine is now available.  You are recommended to get Tetanus Td vaccine, Pneumovax -23 as well.   I would call your insurance to see if a shingles vaccine is covered and get this at your pharmacy         Follow up pending improvement  25 minutes spent with patient.  Over 50% of time counseling      Disclaimer: This note consists of symbols derived from keyboarding, dictation and/or voice recognition software. As a result, there may be errors in the script that have gone undetected. Please consider this when interpreting information found in this chart.    Mikie Lazaro MD  [phone]

## 2018-07-25 ENCOUNTER — OFFICE VISIT (OUTPATIENT)
Dept: FAMILY MEDICINE | Facility: CLINIC | Age: 63
End: 2018-07-25
Payer: COMMERCIAL

## 2018-07-25 VITALS
HEART RATE: 62 BPM | SYSTOLIC BLOOD PRESSURE: 142 MMHG | WEIGHT: 170 LBS | BODY MASS INDEX: 23.8 KG/M2 | DIASTOLIC BLOOD PRESSURE: 89 MMHG | TEMPERATURE: 97.7 F | OXYGEN SATURATION: 100 % | HEIGHT: 71 IN

## 2018-07-25 DIAGNOSIS — Z11.59 NEED FOR HEPATITIS C SCREENING TEST: ICD-10-CM

## 2018-07-25 DIAGNOSIS — E78.5 HYPERLIPIDEMIA, UNSPECIFIED HYPERLIPIDEMIA TYPE: ICD-10-CM

## 2018-07-25 DIAGNOSIS — Z11.4 SCREENING FOR HUMAN IMMUNODEFICIENCY VIRUS: ICD-10-CM

## 2018-07-25 DIAGNOSIS — I25.10 CORONARY ARTERY DISEASE INVOLVING NATIVE HEART WITHOUT ANGINA PECTORIS, UNSPECIFIED VESSEL OR LESION TYPE: Primary | ICD-10-CM

## 2018-07-25 DIAGNOSIS — M10.9 ACUTE GOUTY ARTHRITIS: ICD-10-CM

## 2018-07-25 DIAGNOSIS — Z12.5 SCREENING FOR PROSTATE CANCER: ICD-10-CM

## 2018-07-25 LAB
ALBUMIN SERPL-MCNC: 4.1 G/DL (ref 3.4–5)
ALP SERPL-CCNC: 83 U/L (ref 40–150)
ALT SERPL W P-5'-P-CCNC: 34 U/L (ref 0–70)
ANION GAP SERPL CALCULATED.3IONS-SCNC: 2 MMOL/L (ref 3–14)
AST SERPL W P-5'-P-CCNC: 31 U/L (ref 0–45)
BILIRUB SERPL-MCNC: 0.8 MG/DL (ref 0.2–1.3)
BUN SERPL-MCNC: 10 MG/DL (ref 7–30)
CALCIUM SERPL-MCNC: 9 MG/DL (ref 8.5–10.1)
CHLORIDE SERPL-SCNC: 105 MMOL/L (ref 94–109)
CHOLEST SERPL-MCNC: 162 MG/DL
CO2 SERPL-SCNC: 32 MMOL/L (ref 20–32)
CREAT SERPL-MCNC: 0.97 MG/DL (ref 0.66–1.25)
ERYTHROCYTE [DISTWIDTH] IN BLOOD BY AUTOMATED COUNT: 13.7 % (ref 10–15)
GFR SERPL CREATININE-BSD FRML MDRD: 78 ML/MIN/1.7M2
GLUCOSE SERPL-MCNC: 93 MG/DL (ref 70–99)
HCT VFR BLD AUTO: 45.6 % (ref 40–53)
HDLC SERPL-MCNC: 68 MG/DL
HGB BLD-MCNC: 15.6 G/DL (ref 13.3–17.7)
LDLC SERPL CALC-MCNC: 64 MG/DL
MCH RBC QN AUTO: 31.4 PG (ref 26.5–33)
MCHC RBC AUTO-ENTMCNC: 34.2 G/DL (ref 31.5–36.5)
MCV RBC AUTO: 92 FL (ref 78–100)
NONHDLC SERPL-MCNC: 94 MG/DL
PLATELET # BLD AUTO: 211 10E9/L (ref 150–450)
POTASSIUM SERPL-SCNC: 4.6 MMOL/L (ref 3.4–5.3)
PROT SERPL-MCNC: 7.5 G/DL (ref 6.8–8.8)
PSA SERPL-ACNC: 1.61 UG/L (ref 0–4)
RBC # BLD AUTO: 4.97 10E12/L (ref 4.4–5.9)
SODIUM SERPL-SCNC: 139 MMOL/L (ref 133–144)
TRIGL SERPL-MCNC: 150 MG/DL
URATE SERPL-MCNC: 5.8 MG/DL (ref 3.5–7.2)
WBC # BLD AUTO: 4 10E9/L (ref 4–11)

## 2018-07-25 PROCEDURE — 84550 ASSAY OF BLOOD/URIC ACID: CPT | Performed by: INTERNAL MEDICINE

## 2018-07-25 PROCEDURE — 87389 HIV-1 AG W/HIV-1&-2 AB AG IA: CPT | Performed by: INTERNAL MEDICINE

## 2018-07-25 PROCEDURE — 86803 HEPATITIS C AB TEST: CPT | Performed by: INTERNAL MEDICINE

## 2018-07-25 PROCEDURE — 80061 LIPID PANEL: CPT | Performed by: INTERNAL MEDICINE

## 2018-07-25 PROCEDURE — 85027 COMPLETE CBC AUTOMATED: CPT | Performed by: INTERNAL MEDICINE

## 2018-07-25 PROCEDURE — 36415 COLL VENOUS BLD VENIPUNCTURE: CPT | Performed by: INTERNAL MEDICINE

## 2018-07-25 PROCEDURE — 99214 OFFICE O/P EST MOD 30 MIN: CPT | Performed by: INTERNAL MEDICINE

## 2018-07-25 PROCEDURE — 80053 COMPREHEN METABOLIC PANEL: CPT | Performed by: INTERNAL MEDICINE

## 2018-07-25 PROCEDURE — G0103 PSA SCREENING: HCPCS | Performed by: INTERNAL MEDICINE

## 2018-07-25 NOTE — PATIENT INSTRUCTIONS
(I25.10) Coronary artery disease involving native heart without angina pectoris, unspecified vessel or lesion type  (primary encounter diagnosis)  Comment: dong well - we will continue plan to follow up in cardiology   Plan:     (M10.9) Acute gouty arthritis  Comment: Noted that recent was due to gout.  I would recommend at this point that we check uric acid level and that you continue monitor your diet to avoid high protein and well as avoiding alcohol.  I would not recommend allopurinol after only one attack, however if the attack recurs then I would recommend considering allopurinol for prevention  Plan: Uric acid            (Z12.5) Screening for prostate cancer  Comment:   Plan: PSA, screen          Shingrix vaccine is now available.  You are recommended to get Tetanus Td vaccine, Pneumovax -23 as well.   I would call your insurance to see if a shingles vaccine is covered and get this at your pharmacy

## 2018-07-25 NOTE — MR AVS SNAPSHOT
After Visit Summary   7/25/2018    Odessa Elliott    MRN: [id number]           Patient Information     Date Of Birth          1955        Visit Information        Provider Department      7/25/2018 8:00 AM Mikie Lazaro MD [facility]        Today's Diagnoses     Coronary artery disease involving native heart without angina pectoris, unspecified vessel or lesion type    -  1    Acute gouty arthritis        Screening for prostate cancer        Hyperlipidemia, unspecified hyperlipidemia type        Need for hepatitis C screening test        Screening for human immunodeficiency virus          Care Instructions    (I25.10) Coronary artery disease involving native heart without angina pectoris, unspecified vessel or lesion type  (primary encounter diagnosis)  Comment: dong well - we will continue plan to follow up in cardiology   Plan:     (M10.9) Acute gouty arthritis  Comment: Noted that recent was due to gout.  I would recommend at this point that we check uric acid level and that you continue monitor your diet to avoid high protein and well as avoiding alcohol.  I would not recommend allopurinol after only one attack, however if the attack recurs then I would recommend considering allopurinol for prevention  Plan: Uric acid            (Z12.5) Screening for prostate cancer  Comment:   Plan: PSA, screen          Shingrix vaccine is now available.  You are recommended to get Tetanus Td vaccine, Pneumovax -23 as well.   I would call your insurance to see if a shingles vaccine is covered and get this at your pharmacy               Follow-ups after your visit        Who to contact     If you have questions or need follow up information about today's clinic visit or your schedule please contact [facility] directly at [phone].  Normal or non-critical lab and imaging results will be communicated to you by MyChart, letter or phone within 4 business days after the clinic  "has received the results. If you do not hear from us within 7 days, please contact the clinic through [employer] or phone. If you have a critical or abnormal lab result, we will notify you by phone as soon as possible.  Submit refill requests through [employer] or call your pharmacy and they will forward the refill request to us. Please allow 3 business days for your refill to be completed.          Additional Information About Your Visit        [email or web address] Information     [employer] gives you secure access to your electronic health record. If you see a primary care provider, you can also send messages to your care team and make appointments. If you have questions, please call your primary care clinic.  If you do not have a primary care provider, please call [phone] and they will assist you.        Care EveryWhere ID     This is your Care EveryWhere ID. This could be used by other organizations to access your [place] medical records  [id number]        Your Vitals Were     Pulse Temperature Height Pulse Oximetry BMI (Body Mass Index)       62 97.7  F (36.5  C) (Oral) 5' 11\" (1.803 m) 100% 23.71 kg/m2        Blood Pressure from Last 3 Encounters:   07/25/18 142/89   08/11/17 126/85   02/10/17 (!) 138/91    Weight from Last 3 Encounters:   07/25/18 170 lb (77.1 kg)   08/11/17 176 lb (79.8 kg)   02/10/17 175 lb (79.4 kg)              We Performed the Following     **Hepatitis C Screen Reflex to RNA FUTURE anytime     CBC with platelets     Comprehensive metabolic panel     HIV Antigen Antibody Combo     Lipid panel reflex to direct LDL Fasting     PSA, screen     Uric acid        Primary Care Provider Office Phone # Fax #    Mikie Lazaro [phone] [address]   [place] [address]        Equal Access to Services     MOHAN SIERRA : Bonifacio Banda, torey gardiner, zachary garcia, antwon christian. So [facility] [phone].    ATENCIÓN: " Si habla tiffanie, tiene a rivero disposición servicios gratuitos de asistencia lingüística. Felisa matthews [phone].    We comply with applicable federal civil rights laws and [place] laws. We do not discriminate on the basis of race, color, national origin, age, disability, sex, sexual orientation, or gender identity.            Thank you!     Thank you for choosing [facility]  for your care. Our goal is always to provide you with excellent care. Hearing back from our patients is one way we can continue to improve our services. Please take a few minutes to complete the written survey that you may receive in the mail after your visit with us. Thank you!             Your Updated Medication List - Protect others around you: Learn how to safely use, store and throw away your medicines at www.disposemymeds.org.          This list is accurate as of 7/25/18  8:27 AM.  Always use your most recent med list.                   Brand Name Dispense Instructions for use Diagnosis    aspirin 81 MG tablet      Take 81 mg by mouth daily        atorvastatin 80 MG tablet    LIPITOR    90 tablet    Take 1 tablet (80 mg) by mouth daily    Coronary artery disease involving native coronary artery of native heart without angina pectoris       CALCIUM 600 PO      Take 1 tablet by mouth daily        CoQ-10 100 MG Caps      Take 1 tablet by mouth daily        FISH OIL PO      Take by mouth daily        metoprolol succinate 25 MG 24 hr tablet    TOPROL-XL    45 tablet    Take 0.5 tablets (12.5 mg) by mouth daily    Coronary artery disease involving native coronary artery of native heart without angina pectoris       MULTI VITAMIN DAILY PO           Vitamin D (Cholecalciferol) 1000 units Tabs      Take 1,000 Units by mouth

## 2018-07-26 LAB
HCV AB SERPL QL IA: NONREACTIVE
HIV 1+2 AB+HIV1 P24 AG SERPL QL IA: NONREACTIVE

## 2018-07-31 NOTE — PROGRESS NOTES
Vito Saxena,    I had the opportunity to review your recent labs and a summary of your labs reads as follows:    Your complete blood counts show no sign of anemia, normal white blood cell count and platelets.  Your comprehensive metabolic panel showed normal renal function, normal liver function, and normal fasting blood glucose indicating no evidence of diabetes mellitus.  Your fasting lipid panel show  - normal HDL (good) cholesterol -as your goal is greater than 40  - low LDL (bad) cholesterol as your goal is less than 100  -Stable triglyceride levels  Your PSA level is also stable indicating no evidence of prostate cancer     Congratulaions on your excellent results       Sincerely,  Mikie Lazaro MD

## 2018-10-01 DIAGNOSIS — I25.10 CORONARY ARTERY DISEASE INVOLVING NATIVE CORONARY ARTERY OF NATIVE HEART WITHOUT ANGINA PECTORIS: ICD-10-CM

## 2018-10-01 RX ORDER — ATORVASTATIN CALCIUM 80 MG/1
80 TABLET, FILM COATED ORAL DAILY
Qty: 90 TABLET | Refills: 0 | Status: SHIPPED | OUTPATIENT
Start: 2018-10-01 | End: 2019-01-04

## 2018-10-01 RX ORDER — METOPROLOL SUCCINATE 25 MG/1
12.5 TABLET, EXTENDED RELEASE ORAL DAILY
Qty: 45 TABLET | Refills: 0 | Status: SHIPPED | OUTPATIENT
Start: 2018-10-01 | End: 2019-01-04

## 2018-11-13 ENCOUNTER — OFFICE VISIT (OUTPATIENT)
Dept: FAMILY MEDICINE | Facility: CLINIC | Age: 63
End: 2018-11-13
Payer: COMMERCIAL

## 2018-11-13 ENCOUNTER — TELEPHONE (OUTPATIENT)
Dept: FAMILY MEDICINE | Facility: CLINIC | Age: 63
End: 2018-11-13

## 2018-11-13 VITALS
OXYGEN SATURATION: 100 % | WEIGHT: 176 LBS | BODY MASS INDEX: 24.64 KG/M2 | HEART RATE: 50 BPM | DIASTOLIC BLOOD PRESSURE: 87 MMHG | HEIGHT: 71 IN | SYSTOLIC BLOOD PRESSURE: 144 MMHG | TEMPERATURE: 97 F

## 2018-11-13 DIAGNOSIS — M10.9 ACUTE GOUTY ARTHRITIS: Primary | ICD-10-CM

## 2018-11-13 PROCEDURE — 99214 OFFICE O/P EST MOD 30 MIN: CPT | Performed by: INTERNAL MEDICINE

## 2018-11-13 RX ORDER — ALLOPURINOL 100 MG/1
100 TABLET ORAL DAILY
Qty: 90 TABLET | Refills: 3 | Status: SHIPPED | OUTPATIENT
Start: 2018-11-13 | End: 2019-11-10

## 2018-11-13 RX ORDER — COLCHICINE 0.6 MG/1
0.6 TABLET, FILM COATED ORAL 2 TIMES DAILY
Refills: 0 | COMMUNITY
Start: 2018-07-14 | End: 2018-11-13

## 2018-11-13 RX ORDER — COLCHICINE 0.6 MG/1
0.6 TABLET, FILM COATED ORAL 2 TIMES DAILY
Qty: 20 TABLET | Refills: 0 | Status: SHIPPED | OUTPATIENT
Start: 2018-11-13 | End: 2018-11-13

## 2018-11-13 NOTE — PROGRESS NOTES
"[facility]  CLINIC PROGRESS NOTE    Subjective:  Acute gouty arthritis    Odessa Elliott had first gout attack this Summer and has had no issues since then.  This past week he had been drinking more alcohol.  He notes that colchicine took care of his pain the first time.  He did have uric acid level checked that was well controlled at 5.8.   This AM he took indomethacin and he felt a little off this AM.      Past medical history, medications, allergies, social history, family history reviewed and updated in [facility] as of 11/13/2018 .    ROS  CONSTITUTIONAL: no fatigue, no unexpected change in weight  SKIN: no worrisome rashes, no worrisome moles, no worrisome lesions  EYES: no acute vision problems or changes  ENT: no ear problems, no mouth problems, no throat problems  RESP: no significant cough, no shortness of breath  CV: no chest pain, no palpitations, no new or worsening peripheral edema  GI: no nausea, no vomiting, no constipation, no diarrhea  : no frequency, no dysuria, no hematuria  MS: as above   PSYCHIATRIC: no changes in mood or affect      Objective:  Vitals  /87 (BP Location: Left arm, Patient Position: Chair, Cuff Size: Adult Regular)  Pulse 50  Temp 97  F (36.1  C) (Oral)  Ht 5' 11\" (1.803 m)  Wt 176 lb (79.8 kg)  SpO2 100%  BMI 24.55 kg/m2  GEN: Alert Oriented x3 NAD  HEENT: Atraumatic, normocephalic, neck supple, no thyromegaly, negative cervical adenopathy  CV: RRR no murmurs or rubs  PULM: CTA no wheezes or crackles  ABD: Soft, nontender nondistended, no hepatosplenomegally  SKIN: redness at second toe on left  MS: swelling and induration at the DIP joint of the second toe on the left  EXT: 2+ dorsal pedis pulses, no edema bilateral lower extremities  NEURO:   No focal neurologic deficits  PSYCH: Mood good, affect mood congruent    No images are attached to the encounter.    No results found for this or any previous visit (from the past 24 " hour(s)).    Assessment/Plan:  Patient Instructions   (M10.9) Acute gouty arthritis  (primary encounter diagnosis)  Comment: We will treat empirically with colchicine 0.6 mg twice per day until symptoms resolve .  Start by taking 2 tabs (1.2mg) by mouth at the first sign of a gout attack, then 1 tab (0.6mg) 1 hour later.  Max 3 tabs (1.8mg) over 1 hour.  then continue twice per day dosing the following the day if still needed.  Hold atorvastatin while taking colchicine.  Once symptoms are resolved, then start allopurinol 100 mg tablets and monitor side effects.  Plan to follow up in 6 weeks to recheck gout attack and uric acid level.  Plan: COLCRYS 0.6 MG tablet, allopurinol (ZYLOPRIM)         100 MG tablet, **Uric acid FUTURE 2mo, Basic         metabolic panel            Elevated blood pressure  Comment; Continue metoprolol at low dose and we can follow up in 6 weeks to review and determine if you need another blood pressure medication.    Follow up in 6 weeks    Disclaimer: This note consists of symbols derived from keyboarding, dictation and/or voice recognition software. As a result, there may be errors in the script that have gone undetected. Please consider this when interpreting information found in this chart.    Mikie Lazaro MD  [phone]

## 2018-11-13 NOTE — NURSING NOTE
"Chief Complaint   Patient presents with     Arthritis     (gout) 2nd toe right foot        Initial /87 (BP Location: Left arm, Patient Position: Chair, Cuff Size: Adult Regular)  Pulse 50  Temp 97  F (36.1  C) (Oral)  Ht 5' 11\" (1.803 m)  Wt 176 lb (79.8 kg)  SpO2 100%  BMI 24.55 kg/m2 Estimated body mass index is 24.55 kg/(m^2) as calculated from the following:    Height as of this encounter: 5' 11\" (1.803 m).    Weight as of this encounter: 176 lb (79.8 kg)..    BP completed using cuff size: regular  MEDICATIONS REVIEWED  SOCIAL AND FAMILY HX REVIEWED  Kati Juarez CMA  "

## 2018-11-13 NOTE — MR AVS SNAPSHOT
After Visit Summary   11/13/2018    Odessa Elliott    MRN: [id number]           Patient Information     Date Of Birth          1955        Visit Information        Provider Department      11/13/2018 12:30 PM Mikie Lazaro MD [facility]        Today's Diagnoses     Acute gouty arthritis    -  1      Care Instructions    (M10.9) Acute gouty arthritis  (primary encounter diagnosis)  Comment: We will treat empirically with colchicine 0.6 mg twice per day until symptoms resolve .  Start by taking 2 tabs (1.2mg) by mouth at the first sign of a gout attack, then 1 tab (0.6mg) 1 hour later.  Max 3 tabs (1.8mg) over 1 hour.  then continue twice per day dosing the following the day if still needed.  Hold atorvastatin while taking colchicine.  Once symptoms are resolved, then start allopurinol 100 mg tablets and monitor side effects.  Plan to follow up in 6 weeks to recheck gout attack and uric acid level.  Plan: COLCRYS 0.6 MG tablet, allopurinol (ZYLOPRIM)         100 MG tablet, **Uric acid FUTURE 2mo, Basic         metabolic panel            Elevated blood pressure  Comment; Continue metoprolol at low dose and we can follow up in 6 weeks to review and determine if you need another blood pressure medication.          Follow-ups after your visit        Follow-up notes from your care team     Return in about 6 weeks (around 12/25/2018) for gout.      Your next 10 appointments already scheduled     Dec 18, 2018  1:00 PM CST   [facility]    [address] [address] [phone]           Bring a current list of meds and any records pertaining to this visit. For Physicals, please bring immunization records and any forms needing to be filled out. Please arrive 10 minutes early to complete paperwork.              Future tests that were ordered for you today     Open Future Orders         "Priority Expected Expires Ordered    **Uric acid FUTURE 2mo Routine 1/12/2019 3/13/2019 11/13/2018    Basic metabolic panel Routine  5/12/2019 11/13/2018            Who to contact     If you have questions or need follow up information about today's clinic visit or your schedule please contact [facility] directly at [phone].  Normal or non-critical lab and imaging results will be communicated to you by [employer], letter or phone within 4 business days after the clinic has received the results. If you do not hear from us within 7 days, please contact the clinic through [employer] or phone. If you have a critical or abnormal lab result, we will notify you by phone as soon as possible.  Submit refill requests through [employer] or call your pharmacy and they will forward the refill request to us. Please allow 3 business days for your refill to be completed.          Additional Information About Your Visit        [employer] Information     [employer] gives you secure access to your electronic health record. If you see a primary care provider, you can also send messages to your care team and make appointments. If you have questions, please call your primary care clinic.  If you do not have a primary care provider, please call [phone] and they will assist you.        Care EveryWhere ID     This is your Care EveryWhere ID. This could be used by other organizations to access your [place] medical records  [id number]        Your Vitals Were     Pulse Temperature Height Pulse Oximetry BMI (Body Mass Index)       50 97  F (36.1  C) (Oral) 5' 11\" (1.803 m) 100% 24.55 kg/m2        Blood Pressure from Last 3 Encounters:   11/13/18 144/87   07/25/18 142/89   08/11/17 126/85    Weight from Last 3 Encounters:   11/13/18 176 lb (79.8 kg)   07/25/18 170 lb (77.1 kg)   08/11/17 176 lb (79.8 kg)                 Today's Medication Changes          These changes are accurate as of 11/13/18 12:56 PM.  If you have any questions, " ask your nurse or doctor.               Start taking these medicines.        Dose/Directions    allopurinol 100 MG tablet   Commonly known as:  ZYLOPRIM   Used for:  Acute gouty arthritis   Started by:  Mikie Lazaro MD        Dose:  100 mg   Take 1 tablet (100 mg) by mouth daily   Quantity:  90 tablet   Refills:  3            Where to get your medicines      These medications were sent to [facility] [address] [place] [facility] [address] [address]  [address] [address], [facility] [address]     Phone:  [phone]     allopurinol 100 MG tablet         These medications were sent to [facility] [address]  [address], [facility] [address]     Phone:  [phone]     COLCRYS 0.6 MG tablet                Primary Care Provider Office Phone # Fax #    Mikie Lazaro [phone] [address]   [facility] [address]        Equal Access to Services     RAFAEL [facility] AH: Hadii aad ku hadasho Soomaali, waaxda luqadaha, qaybta kaalmada adeegyada, waxay idiin hayrebecan aye garrett . So [facility] [phone].    ATENCIÓN: Si habla español, tiene a rivero disposición servicios gratuitos de asistencia lingüística. [facility] [phone].    We comply with applicable federal civil rights laws and [place] laws. We do not discriminate on the basis of race, color, national origin, age, disability, sex, sexual orientation, or gender identity.            Thank you!     Thank you for choosing [facility]  for your care. Our goal is always to provide you with excellent care. Hearing back from our patients is one way we can continue to improve our services. Please take a few minutes to complete the written survey that you may receive in the mail after your visit with us. Thank you!             Your Updated Medication List - Protect others around you: Learn how to safely use, store and throw away your medicines at  www.disposemymeds.org.          This list is accurate as of 11/13/18 12:56 PM.  Always use your most recent med list.                   Brand Name Dispense Instructions for use Diagnosis    allopurinol 100 MG tablet    ZYLOPRIM    90 tablet    Take 1 tablet (100 mg) by mouth daily    Acute gouty arthritis       aspirin 81 MG tablet      Take 81 mg by mouth daily        atorvastatin 80 MG tablet    LIPITOR    90 tablet    Take 1 tablet (80 mg) by mouth daily    Coronary artery disease involving native coronary artery of native heart without angina pectoris       CALCIUM 600 PO      Take 1 tablet by mouth daily        COLCRYS 0.6 MG tablet   Generic drug:  colchicine     20 tablet    Take 1 tablet (0.6 mg) by mouth 2 times daily    Acute gouty arthritis       CoQ-10 100 MG Caps      Take 1 tablet by mouth daily        FISH OIL PO      Take by mouth daily        metoprolol succinate 25 MG 24 hr tablet    TOPROL-XL    45 tablet    Take 0.5 tablets (12.5 mg) by mouth daily    Coronary artery disease involving native coronary artery of native heart without angina pectoris       MULTI VITAMIN DAILY PO           Vitamin D (Cholecalciferol) 1000 units Tabs      Take 1,000 Units by mouth

## 2018-11-13 NOTE — PATIENT INSTRUCTIONS
(M10.9) Acute gouty arthritis  (primary encounter diagnosis)  Comment: We will treat empirically with colchicine 0.6 mg twice per day until symptoms resolve .  Start by taking 2 tabs (1.2mg) by mouth at the first sign of a gout attack, then 1 tab (0.6mg) 1 hour later.  Max 3 tabs (1.8mg) over 1 hour.  then continue twice per day dosing the following the day if still needed.  Hold atorvastatin while taking colchicine.  Once symptoms are resolved, then start allopurinol 100 mg tablets and monitor side effects.  Plan to follow up in 6 weeks to recheck gout attack and uric acid level.  Plan: COLCRYS 0.6 MG tablet, allopurinol (ZYLOPRIM)         100 MG tablet, **Uric acid FUTURE 2mo, Basic         metabolic panel            Elevated blood pressure  Comment; Continue metoprolol at low dose and we can follow up in 6 weeks to review and determine if you need another blood pressure medication.

## 2018-11-13 NOTE — TELEPHONE ENCOUNTER
Spoke with patient     States he is having a flare-up of gout, had an experience in July     Took 10 tablets of colchicine in the past (different provider?)    Affect second toe on left side - very tip is a little enlarged, no redness (just started to notice on Saturday, not as bad as it was before)     In July also had redness/warmth     Per July OV notes:   Acute gouty arthritis  Comment: Noted that recent was due to gout.  I would recommend at this point that we check uric acid level and that you continue monitor your diet to avoid high protein and well as avoiding alcohol.  I would not recommend allopurinol after only one attack, however if the attack recurs then I would recommend considering allopurinol for prevention  Plan: Uric acid    Uric Acid   Date Value Ref Range Status   07/25/2018 5.8 3.5 - 7.2 mg/dL Final       Spoke with patient - advised OV for possible gout flare-up and to discuss treatment options going forward. Pt scheduled to see PCP this afternoon.     Next 5 appointments (look out 90 days)     Nov 13, 2018 12:30 PM CST   Office Visit with Mikie Lazaro MD   [facility] ([facility]    [address] [facility] [address]   [phone]                Raisa BLACKMAN RN

## 2018-11-13 NOTE — TELEPHONE ENCOUNTER
Reason for Call:  Medication or medication refill:    Do you use a [facility]?  Name of the pharmacy and phone number for the current request:       [facility] [address] [place] [address] [place] [address]        Name of the medication requested: Colchozine    Other request: Gout-pt would like avoid coming in    Can we leave a detailed message on this number? YES    Phone number patient can be reached at: Cell number on file:    Telephone Information:   Mobile [phone]       Best Time: any    Call taken on 11/13/2018 at 9:58 AM by Margy Funk

## 2018-11-14 RX ORDER — COLCHICINE 0.6 MG/1
TABLET ORAL
Qty: 30 TABLET | Refills: 0 | Status: SHIPPED | OUTPATIENT
Start: 2018-11-14 | End: 2019-01-04

## 2018-11-14 NOTE — TELEPHONE ENCOUNTER
Order for Colchicine 0.6 mg tablet for gout symptom relief pended for provider review. Medication is not currently active on patient's medication list but is having symptoms.    Arturo Cadet CMA on 11/14/2018 at 10:18 AM

## 2018-12-31 DIAGNOSIS — I25.10 CORONARY ARTERY DISEASE INVOLVING NATIVE CORONARY ARTERY OF NATIVE HEART WITHOUT ANGINA PECTORIS: ICD-10-CM

## 2018-12-31 NOTE — TELEPHONE ENCOUNTER
Called pt - refill request from [facility] received for metoprolol XL and atorvastatin.  Atorvastatin was placed on hold by Dr. Lazaro while pt is taking colchicine for gout  In Nov 2018.  We have not seen pt since 08/2017.  Left message to call back to discuss.

## 2019-01-04 ENCOUNTER — TELEPHONE (OUTPATIENT)
Dept: CARDIOLOGY | Facility: CLINIC | Age: 64
End: 2019-01-04

## 2019-01-04 RX ORDER — ATORVASTATIN CALCIUM 80 MG/1
80 TABLET, FILM COATED ORAL DAILY
Qty: 90 TABLET | Refills: 3 | Status: SHIPPED | OUTPATIENT
Start: 2019-01-04 | End: 2020-03-31

## 2019-01-04 RX ORDER — METOPROLOL SUCCINATE 25 MG/1
12.5 TABLET, EXTENDED RELEASE ORAL DAILY
Qty: 45 TABLET | Refills: 3 | Status: SHIPPED | OUTPATIENT
Start: 2019-01-04 | End: 2020-02-28

## 2019-01-04 NOTE — TELEPHONE ENCOUNTER
Received refill request from [facility] for Atorvastatin and Metoprolol. Letter was sent prev to pt 10/1/18 that he is overdue for appt. Last OV was 8/11/17. Pt overdue for appt. Called and left a message for pt to call back, to review getting appt scheduled before refill of meds.

## 2019-01-04 NOTE — TELEPHONE ENCOUNTER
Pt called back - he is back on atorvastatin.  Off colchicine.  Gout episode resolved.  Pt will follow up with Dr. Perez with hx of CAD/CABG  Ov 02/22/2019  Refilled medications

## 2019-06-06 ENCOUNTER — TELEPHONE (OUTPATIENT)
Dept: FAMILY MEDICINE | Facility: CLINIC | Age: 64
End: 2019-06-06

## 2019-06-06 DIAGNOSIS — M10.9 ACUTE GOUTY ARTHRITIS: ICD-10-CM

## 2019-06-06 RX ORDER — COLCHICINE 0.6 MG/1
TABLET ORAL
Qty: 30 TABLET | Refills: 3 | Status: SHIPPED | OUTPATIENT
Start: 2019-06-06 | End: 2020-11-05

## 2019-06-06 RX ORDER — COLCHICINE 0.6 MG/1
TABLET, FILM COATED ORAL
Refills: 0 | COMMUNITY
Start: 2018-11-14 | End: 2019-06-06

## 2019-06-06 NOTE — TELEPHONE ENCOUNTER
"colchicine (COLCRYS) 0.6 MG tablet    Last Written Prescription Date:  11/14/2018  Last Fill Quantity: -,  # refills: 0   Last office visit: 11/13/2018 with prescribing provider:  Iveth   Future Office Visit:  Unknown     Requested Prescriptions   Pending Prescriptions Disp Refills     colchicine (COLCRYS) 0.6 MG tablet [Pharmacy Med Name: COLCRYS 0.6 MG TABLET] 30 tablet 0     Sig: TAKE 2 TABS ORALLY AT THE FIRST SIGN OF A GOUT ATTACK, THEN 1 TAB 1 HR LATER. MAX 3 TABS OVER 1 HR.       Gout Agents Protocol Failed - 6/6/2019  9:55 AM        Failed - Medication is active on med list        Passed - CBC on file in past 12 months     Recent Labs   Lab Test 07/25/18  0837   WBC 4.0   RBC 4.97   HGB 15.6   HCT 45.6                    Passed - ALT on file in past 12 months     Recent Labs   Lab Test 07/25/18  0837   ALT 34             Passed - Has Uric Acid on file in past 12 months and value is less than 6     Recent Labs   Lab Test 07/25/18  0837   URIC 5.8     If level is 6mg/dL or greater, ok to refill one time and refer to provider.           Passed - Recent (12 mo) or future (30 days) visit within the authorizing provider's specialty     Patient had office visit in the last 12 months or has a visit in the next 30 days with authorizing provider or within the authorizing provider's specialty.  See \"Patient Info\" tab in inbasket, or \"Choose Columns\" in Meds & Orders section of the refill encounter.              Passed - Patient is age 18 or older        Passed - Normal serum creatinine on file in the past 12 months     Recent Labs   Lab Test 07/25/18  0837   CR 0.97               "

## 2019-06-07 RX ORDER — COLCHICINE 0.6 MG/1
TABLET ORAL
Start: 2019-06-07

## 2019-09-27 ENCOUNTER — TRANSFERRED RECORDS (OUTPATIENT)
Dept: HEALTH INFORMATION MANAGEMENT | Facility: CLINIC | Age: 64
End: 2019-09-27

## 2019-10-29 ENCOUNTER — OFFICE VISIT (OUTPATIENT)
Dept: FAMILY MEDICINE | Facility: CLINIC | Age: 64
End: 2019-10-29
Payer: COMMERCIAL

## 2019-10-29 VITALS
DIASTOLIC BLOOD PRESSURE: 84 MMHG | HEIGHT: 71 IN | OXYGEN SATURATION: 99 % | WEIGHT: 170 LBS | SYSTOLIC BLOOD PRESSURE: 120 MMHG | HEART RATE: 57 BPM | BODY MASS INDEX: 23.8 KG/M2 | TEMPERATURE: 96.1 F

## 2019-10-29 DIAGNOSIS — Z95.1 S/P CABG X 5: ICD-10-CM

## 2019-10-29 DIAGNOSIS — Z01.818 PREOP GENERAL PHYSICAL EXAM: Primary | ICD-10-CM

## 2019-10-29 DIAGNOSIS — M10.9 ACUTE GOUTY ARTHRITIS: ICD-10-CM

## 2019-10-29 DIAGNOSIS — M16.11 PRIMARY OSTEOARTHRITIS OF RIGHT HIP: ICD-10-CM

## 2019-10-29 DIAGNOSIS — I25.10 CORONARY ARTERY DISEASE INVOLVING NATIVE HEART WITHOUT ANGINA PECTORIS, UNSPECIFIED VESSEL OR LESION TYPE: ICD-10-CM

## 2019-10-29 DIAGNOSIS — Z12.5 SCREENING FOR PROSTATE CANCER: ICD-10-CM

## 2019-10-29 LAB
ALBUMIN SERPL-MCNC: 4.1 G/DL (ref 3.4–5)
ALP SERPL-CCNC: 86 U/L (ref 40–150)
ALT SERPL W P-5'-P-CCNC: 30 U/L (ref 0–70)
ANION GAP SERPL CALCULATED.3IONS-SCNC: 5 MMOL/L (ref 3–14)
AST SERPL W P-5'-P-CCNC: 26 U/L (ref 0–45)
BILIRUB SERPL-MCNC: 0.8 MG/DL (ref 0.2–1.3)
BUN SERPL-MCNC: 11 MG/DL (ref 7–30)
CALCIUM SERPL-MCNC: 8.8 MG/DL (ref 8.5–10.1)
CHLORIDE SERPL-SCNC: 107 MMOL/L (ref 94–109)
CHOLEST SERPL-MCNC: 140 MG/DL
CO2 SERPL-SCNC: 28 MMOL/L (ref 20–32)
CREAT SERPL-MCNC: 0.85 MG/DL (ref 0.66–1.25)
ERYTHROCYTE [DISTWIDTH] IN BLOOD BY AUTOMATED COUNT: 14 % (ref 10–15)
GFR SERPL CREATININE-BSD FRML MDRD: >90 ML/MIN/{1.73_M2}
GLUCOSE SERPL-MCNC: 77 MG/DL (ref 70–99)
HCT VFR BLD AUTO: 43.6 % (ref 40–53)
HDLC SERPL-MCNC: 54 MG/DL
HGB BLD-MCNC: 15 G/DL (ref 13.3–17.7)
LDLC SERPL CALC-MCNC: 64 MG/DL
MCH RBC QN AUTO: 31.6 PG (ref 26.5–33)
MCHC RBC AUTO-ENTMCNC: 34.4 G/DL (ref 31.5–36.5)
MCV RBC AUTO: 92 FL (ref 78–100)
MRSA DNA SPEC QL NAA+PROBE: NEGATIVE
NONHDLC SERPL-MCNC: 86 MG/DL
PLATELET # BLD AUTO: 179 10E9/L (ref 150–450)
POTASSIUM SERPL-SCNC: 4.6 MMOL/L (ref 3.4–5.3)
PROT SERPL-MCNC: 7 G/DL (ref 6.8–8.8)
PSA SERPL-ACNC: 1.26 UG/L (ref 0–4)
RBC # BLD AUTO: 4.75 10E12/L (ref 4.4–5.9)
SODIUM SERPL-SCNC: 140 MMOL/L (ref 133–144)
SPECIMEN SOURCE: NORMAL
TRIGL SERPL-MCNC: 111 MG/DL
URATE SERPL-MCNC: 5.2 MG/DL (ref 3.5–7.2)
WBC # BLD AUTO: 3.7 10E9/L (ref 4–11)

## 2019-10-29 PROCEDURE — 90471 IMMUNIZATION ADMIN: CPT | Performed by: INTERNAL MEDICINE

## 2019-10-29 PROCEDURE — 93000 ELECTROCARDIOGRAM COMPLETE: CPT | Performed by: INTERNAL MEDICINE

## 2019-10-29 PROCEDURE — 87641 MR-STAPH DNA AMP PROBE: CPT | Performed by: INTERNAL MEDICINE

## 2019-10-29 PROCEDURE — 84550 ASSAY OF BLOOD/URIC ACID: CPT | Performed by: INTERNAL MEDICINE

## 2019-10-29 PROCEDURE — 99215 OFFICE O/P EST HI 40 MIN: CPT | Mod: 25 | Performed by: INTERNAL MEDICINE

## 2019-10-29 PROCEDURE — 90472 IMMUNIZATION ADMIN EACH ADD: CPT | Performed by: INTERNAL MEDICINE

## 2019-10-29 PROCEDURE — 85027 COMPLETE CBC AUTOMATED: CPT | Performed by: INTERNAL MEDICINE

## 2019-10-29 PROCEDURE — G0103 PSA SCREENING: HCPCS | Performed by: INTERNAL MEDICINE

## 2019-10-29 PROCEDURE — 90662 IIV NO PRSV INCREASED AG IM: CPT | Performed by: INTERNAL MEDICINE

## 2019-10-29 PROCEDURE — 87640 STAPH A DNA AMP PROBE: CPT | Performed by: INTERNAL MEDICINE

## 2019-10-29 PROCEDURE — 80061 LIPID PANEL: CPT | Performed by: INTERNAL MEDICINE

## 2019-10-29 PROCEDURE — 36415 COLL VENOUS BLD VENIPUNCTURE: CPT | Performed by: INTERNAL MEDICINE

## 2019-10-29 PROCEDURE — 90714 TD VACC NO PRESV 7 YRS+ IM: CPT | Performed by: INTERNAL MEDICINE

## 2019-10-29 PROCEDURE — 80053 COMPREHEN METABOLIC PANEL: CPT | Performed by: INTERNAL MEDICINE

## 2019-10-29 ASSESSMENT — MIFFLIN-ST. JEOR: SCORE: 1578.24

## 2019-10-29 NOTE — PROGRESS NOTES
[address] [address]  [phone]  Dept: [phone]    PRE-OP EVALUATION:  Today's date: 10/29/2019    Odessa Elliott (: 1955) presents for pre-operative evaluation assessment as requested by Dr. Maier.  He requires evaluation and anesthesia risk assessment prior to undergoing surgery/procedure for treatment of right hip  .    Proposed Surgery/ Procedure: Total Hip Replacement, Right  Date of Surgery/ Procedure: 19  Time of Surgery/ Procedure: [facility]  Hospital/Surgical Facility: [facility]  Fax number for surgical facility: [phone]  Primary Physician: Mikie Lazaro  Type of Anesthesia Anticipated: General    Patient has a Health Care Directive or Living Will:  NO    1. YES - DO YOU HAVE A HISTORY OF HEART ATTACK, STROKE, STENT, BYPASS OR SURGERY ON AN ARTERY IN THE HEAD, NECK, HEART OR LEG? - history of CABG x 5  2. NO - Do you ever have any pain or discomfort in your chest?  3. NO - Do you have a history of  Heart Failure?  4. NO - Are you troubled by shortness of breath when: walking on the level, up a slight hill or at night?  5. NO - Do you currently have a cold, bronchitis or other respiratory infection?  6. NO - Do you have a cough, shortness of breath or wheezing?  7. NO - Do you sometimes get pains in the calves of your legs when you walk?  8. NO - Do you or anyone in your family have previous history of blood clots?  9. NO - Do you or does anyone in your family have a serious bleeding problem such as prolonged bleeding following surgeries or cuts?  10. NO - Have you ever had problems with anemia or been told to take iron pills?  11. NO - Have you had any abnormal blood loss such as black, tarry or bloody stools, or abnormal vaginal bleeding?  12. NO - Have you ever had a blood transfusion?  13. NO - Have you or any of your relatives ever had problems with anesthesia?  14. NO - Do you have sleep apnea, excessive snoring or  daytime drowsiness?  15. NO - Do you have any prosthetic heart valves?  16. NO - Do you have prosthetic joints?  17. NO - Is there any chance that you may be pregnant?      HPI:     HPI related to upcoming procedure: degenerative joint disease hip requiring total hip arthroplasty       See problem list for active medical problems.  Problems all longstanding and stable, except as noted/documented.  See ROS for pertinent symptoms related to these conditions.      MEDICAL HISTORY:     Patient Active Problem List    Diagnosis Date Noted     Acute gouty arthritis 07/25/2018     Priority: Medium     Acromioclavicular sprain 06/09/2014     Priority: Medium     Lower GI bleed 09/30/2013     Priority: Medium     S/P CABG x 5 09/27/2013     Priority: Medium     Coronary artery disease 09/23/2013     Priority: Medium     5 v. CABG 9/13       Advanced directives, counseling/discussion 08/31/2012     Priority: Medium     Discussed advance care planning with patient; information given to patient to review. 8/31/2012   GARRET Salmon  9/5/12 Mailed pt informational letter regarding the [employer] for the development of an Advance Care Directive. Chayito Sheridan RN               Hearing loss 11/29/2007     Priority: Medium     Problem list name updated by automated process. Provider to review       Hyperlipidemia 11/29/2007     Priority: Medium     Problem list name updated by automated process. Provider to review        Past Medical History:   Diagnosis Date     Chest pain      Coronary artery disease     s/p 5v CABG     Hyperlipidaemia      Shortness of breath      Urinary retention      Past Surgical History:   Procedure Laterality Date     BYPASS GRAFT ARTERY CORONARY  9/23/2013    Procedure: BYPASS GRAFT ARTERY CORONARY;  CORONARY ARTERY BYPASS GRAFT X 5  WITH ENDOSCOPIC VEIN HARVESTING (LAD, PDA, SIMRAN, OM1 AND OM2 );  Surgeon: Kwadwo Canales MD;  Location:  OR     COLONOSCOPY  10/2013    Shortly after bypass  "surgery     CORONARY ARTERY BYPASS  2013    LIMA =LAD,svg=PDA,Svg=PLRca, Svg=Om1,Svg=OM2     NO HISTORY OF SURGERY       ORTHOPEDIC SURGERY      cortisone shot in back for pain     Current Outpatient Medications   Medication Sig Dispense Refill     allopurinol (ZYLOPRIM) 100 MG tablet Take 1 tablet (100 mg) by mouth daily 90 tablet 3     aspirin 81 MG tablet Take 81 mg by mouth daily       atorvastatin (LIPITOR) 80 MG tablet Take 1 tablet (80 mg) by mouth daily 90 tablet 3     Calcium Carbonate (CALCIUM 600 PO) Take 1 tablet by mouth daily       Coenzyme Q10 (COQ-10) 100 MG CAPS Take 1 tablet by mouth daily       metoprolol succinate ER (TOPROL-XL) 25 MG 24 hr tablet Take 0.5 tablets (12.5 mg) by mouth daily 45 tablet 3     Multiple Vitamin (MULTI VITAMIN DAILY PO)        Omega-3 Fatty Acids (FISH OIL PO) Take by mouth daily       Vitamin D, Cholecalciferol, 1000 UNITS TABS Take 1,000 Units by mouth        colchicine (COLCRYS) 0.6 MG tablet Take 2 tabs (1.2mg) by mouth at the first sign of a gout attack, then 1 tab (0.6mg) 1 hour later.  Max 3 tabs (1.8mg) over 1 hour. (Patient not taking: Reported on 10/29/2019) 30 tablet 3     OTC products: Aspirin    No Known Allergies   Latex Allergy: NO    Social History     Tobacco Use     Smoking status: Never Smoker     Smokeless tobacco: Never Used   Substance Use Topics     Alcohol use: Yes     Alcohol/week: 6.0 standard drinks     Comment: ocassional beer or wine.     History   Drug Use No       REVIEW OF SYSTEMS:   Constitutional, neuro, ENT, endocrine, pulmonary, cardiac, gastrointestinal, genitourinary, musculoskeletal, integument and psychiatric systems are negative, except as otherwise noted.    EXAM:   /84 (BP Location: Left arm, Cuff Size: Adult Regular)   Pulse 57   Temp 96.1  F (35.6  C) (Tympanic)   Ht 1.803 m (5' 11\")   Wt 77.1 kg (170 lb)   SpO2 99%   BMI 23.71 kg/m      GENERAL APPEARANCE: healthy, alert and no distress     EYES: EOMI,  PERRL     " HENT: ear canals and TM's normal and nose and mouth without ulcers or lesions     NECK: no adenopathy, no asymmetry, masses, or scars and thyroid normal to palpation     RESP: lungs clear to auscultation - no rales, rhonchi or wheezes     CV: regular rates and rhythm, normal S1 S2, no S3 or S4 and no murmur, click or rub     ABDOMEN:  soft, nontender, no HSM or masses and bowel sounds normal     MS: extremities normal- no gross deformities noted, no evidence of inflammation in joints, FROM in all extremities.     SKIN: no suspicious lesions or rashes     NEURO: Normal strength and tone, sensory exam grossly normal, mentation intact and speech normal     PSYCH: mentation appears normal. and affect normal/bright     LYMPHATICS: No cervical adenopathy    DIAGNOSTICS:     EKG: appears normal, NSR, normal axis, normal intervals, no acute ST/T changes c/w ischemia, no LVH by voltage criteria, unchanged from previous tracings  Labs Resulted Today:   Results for orders placed or performed in visit on 07/25/18   Uric acid   Result Value Ref Range    Uric Acid 5.8 3.5 - 7.2 mg/dL   PSA, screen   Result Value Ref Range    PSA 1.61 0 - 4 ug/L   CBC with platelets   Result Value Ref Range    WBC 4.0 4.0 - 11.0 10e9/L    RBC Count 4.97 4.4 - 5.9 10e12/L    Hemoglobin 15.6 13.3 - 17.7 g/dL    Hematocrit 45.6 40.0 - 53.0 %    MCV 92 78 - 100 fl    MCH 31.4 26.5 - 33.0 pg    MCHC 34.2 31.5 - 36.5 g/dL    RDW 13.7 10.0 - 15.0 %    Platelet Count 211 150 - 450 10e9/L   Lipid panel reflex to direct LDL Fasting   Result Value Ref Range    Cholesterol 162 <200 mg/dL    Triglycerides 150 (H) <150 mg/dL    HDL Cholesterol 68 >39 mg/dL    LDL Cholesterol Calculated 64 <100 mg/dL    Non HDL Cholesterol 94 <130 mg/dL   Comprehensive metabolic panel   Result Value Ref Range    Sodium 139 133 - 144 mmol/L    Potassium 4.6 3.4 - 5.3 mmol/L    Chloride 105 94 - 109 mmol/L    Carbon Dioxide 32 20 - 32 mmol/L    Anion Gap 2 (L) 3 - 14 mmol/L     Glucose 93 70 - 99 mg/dL    Urea Nitrogen 10 7 - 30 mg/dL    Creatinine 0.97 0.66 - 1.25 mg/dL    GFR Estimate 78 >60 mL/min/1.7m2    GFR Estimate If Black >90 >60 mL/min/1.7m2    Calcium 9.0 8.5 - 10.1 mg/dL    Bilirubin Total 0.8 0.2 - 1.3 mg/dL    Albumin 4.1 3.4 - 5.0 g/dL    Protein Total 7.5 6.8 - 8.8 g/dL    Alkaline Phosphatase 83 40 - 150 U/L    ALT 34 0 - 70 U/L    AST 31 0 - 45 U/L   **Hepatitis C Screen Reflex to RNA FUTURE anytime   Result Value Ref Range    Hepatitis C Antibody Nonreactive NR^Nonreactive   HIV Antigen Antibody Combo   Result Value Ref Range    HIV Antigen Antibody Combo Nonreactive NR^Nonreactive         Labs Drawn and in Process:   Unresulted Labs Ordered in the Past 30 Days of this Admission     No orders found from 9/29/2019 to 10/30/2019.          Recent Labs   Lab Test 07/25/18  0837 02/10/17  0831  09/30/13  1200  09/25/13  0515  09/23/13  2045  09/19/13  1350   HGB 15.6 15.0   < > 11.2*   < > 9.3*   < > 13.6   < >  --     176   < > 331   < > 99*   < > 147*   < >  --    INR  --   --   --  1.06  --  1.22*   < > 1.23*   < >  --     141   < > 134   < > 136   < > 133   < >  --    POTASSIUM 4.6 4.7   < > 4.4   < > 4.0   < > 5.1   < >  --    CR 0.97 0.86   < > 0.86   < > 0.71   < > 0.75  --   --    A1C  --   --   --   --   --   --   --  4.6  --  4.7    < > = values in this interval not displayed.        IMPRESSION:   Reason for surgery/procedure: degenerative joint disease hip  Diagnosis/reason for consult: Pre-operative Evaluation    The proposed surgical procedure is considered INTERMEDIATE risk.    REVISED CARDIAC RISK INDEX  The patient has the following serious cardiovascular risks for perioperative complications such as (MI, PE, VFib and 3  AV Block):  Coronary Artery Disease (MI, positive stress test, angina, Qs on EKG)  INTERPRETATION: 1 risks: Class II (low risk - 0.9% complication rate)    The patient has the following additional risks for perioperative  complications:  No identified additional risks      ICD-10-CM    1. Preop general physical exam Z01.818        RECOMMENDATIONS:     (Z01.818) Preop general physical exam  (primary encounter diagnosis)  Comment: you are medically optimized for your upcoming surgery pending EKG and labs.  Hold aspirin, NSAIDs, fish oil for 1 week leading up to surgery.  Continue your metoprolol and atorvastatin as you would normally take them.  Plan: CBC with platelets, Comprehensive metabolic         panel, Methicillin Resistant Staph Aureus PCR,         EKG 12-lead complete w/read - Clinics            (M16.11) Primary osteoarthritis of right hip  Comment: Plan for total hip arthroplasty upcoming  Plan: Methicillin Resistant Staph Aureus PCR            (I25.10) Coronary artery disease involving native heart without angina pectoris, unspecified vessel or lesion type  Comment: check labs and continue beta blocker and statin  Plan: Lipid panel reflex to direct LDL Fasting            (Z95.1) S/P CABG x 5  Comment: as above   Plan:     (M10.9) Acute gouty arthritis  Comment: Check uric acid and conitnue allopurinol  Plan: Uric acid            (Z12.5) Screening for prostate cancer  Comment:   Plan: PSA, screen          Debrox drops for ear wax     Flu shot given today and TD also given    Pneumonia Vaccine is recommended at age 65    Shingrix vaccine is now available.  I would call your insurance to see if a shingles vaccine is covered and get this at your pharmacy           Signed Electronically by: Mikie Lazaro MD, MD    Copy of this evaluation report is provided to requesting physician.    [facility] Preop Guidelines    Revised Cardiac Risk Index

## 2019-10-29 NOTE — PATIENT INSTRUCTIONS
Before Your Surgery      Call your surgeon if there is any change in your health. This includes signs of a cold or flu (such as a sore throat, runny nose, cough, rash or fever).    Do not smoke, drink alcohol or take over the counter medicine (unless your surgeon or primary care doctor tells you to) for the 24 hours before and after surgery.    If you take prescribed drugs: Follow your doctor s orders about which medicines to take and which to stop until after surgery.    Eating and drinking prior to surgery: follow the instructions from your surgeon    Take a shower or bath the night before surgery. Use the soap your surgeon gave you to gently clean your skin. If you do not have soap from your surgeon, use your regular soap. Do not shave or scrub the surgery site.  Wear clean pajamas and have clean sheets on your bed.       (Z01.818) Preop general physical exam  (primary encounter diagnosis)  Comment: you are medically optimized for your upcoming surgery pending EKG and labs.  Hold aspirin, NSAIDs, fish oil for 1 week leading up to surgery.  Continue your metoprolol and atorvastatin as you would normally take them.  Plan: CBC with platelets, Comprehensive metabolic         panel, Methicillin Resistant Staph Aureus PCR,         EKG 12-lead complete w/read - Clinics            (M16.11) Primary osteoarthritis of right hip  Comment: Plan for total hip arthroplasty upcoming  Plan: Methicillin Resistant Staph Aureus PCR            (I25.10) Coronary artery disease involving native heart without angina pectoris, unspecified vessel or lesion type  Comment: check labs and continue beta blocker and statin  Plan: Lipid panel reflex to direct LDL Fasting            (Z95.1) S/P CABG x 5  Comment: as above   Plan:     (M10.9) Acute gouty arthritis  Comment: Check uric acid and conitnue allopurinol  Plan: Uric acid            (Z12.5) Screening for prostate cancer  Comment:   Plan: PSA, screen          Debrox drops for ear wax      Flu shot given today and TD also given    Pneumonia Vaccine is recommended at age 65    Shingrix vaccine is now available.  I would call your insurance to see if a shingles vaccine is covered and get this at your pharmacy

## 2019-10-29 NOTE — NURSING NOTE
Prior to immunization administration, verified patients identity using patient s name and date of birth. Please see Immunization Activity for additional information.     Screening Questionnaire for Adult Immunization    Are you sick today?   No   Do you have allergies to medications, food, a vaccine component or latex?   No   Have you ever had a serious reaction after receiving a vaccination?   No   Do you have a long-term health problem with heart disease, lung disease, asthma, kidney disease, metabolic disease (e.g. diabetes), anemia, or other blood disorder?   No   Do you have cancer, leukemia, HIV/AIDS, or any other immune system problem?   No   In the past 3 months, have you taken medications that affect  your immune system, such as prednisone, other steroids, or anticancer drugs; drugs for the treatment of rheumatoid arthritis, Crohn s disease, or psoriasis; or have you had radiation treatments?   No   Have you had a seizure, or a brain or other nervous system problem?   No   During the past year, have you received a transfusion of blood or blood     products, or been given immune (gamma) globulin or antiviral drug?   No   For women: Are you pregnant or is there a chance you could become        pregnant during the next month?   No   Have you received any vaccinations in the past 4 weeks?   No     Immunization questionnaire answers were all negative.        Per orders of Dr. Lazaro, injection of Td given by Shakira Kennedy CMA. Patient instructed to remain in clinic for 15 minutes afterwards, and to report any adverse reaction to me immediately.       Screening performed by Shakira Kennedy CMA on 10/29/2019 at 8:08 AM.

## 2019-11-01 NOTE — RESULT ENCOUNTER NOTE
Vito Saxena,    I had the opportunity to review your recent labs and a summary of your labs reads as follows:    Your complete blood counts show no sign of anemia, stable low white blood cell count and platelets.  Your comprehensive metabolic panel showed normal renal function, normal liver function, and normal fasting blood glucose indicating no evidence of diabetes mellitus.  Your fasting lipid panel show  - normal HDL (good) cholesterol -as your goal is greater than 40  - low LDL (bad) cholesterol as your goal is less than 130  - normal triglyceride levels  Uric acid level remain stable  Your PSA level is also stable indicating no evidence of prostate cancer       Congratulations on your excellent labs    Sincerely,  Mikie Lazaro MD

## 2019-11-05 ENCOUNTER — HEALTH MAINTENANCE LETTER (OUTPATIENT)
Age: 64
End: 2019-11-05

## 2019-11-10 DIAGNOSIS — M10.9 ACUTE GOUTY ARTHRITIS: ICD-10-CM

## 2019-11-11 RX ORDER — ALLOPURINOL 100 MG/1
TABLET ORAL
Qty: 30 TABLET | Refills: 5 | Status: SHIPPED | OUTPATIENT
Start: 2019-11-11 | End: 2020-02-17

## 2019-11-11 NOTE — TELEPHONE ENCOUNTER
"Requested Prescriptions   Pending Prescriptions Disp Refills     allopurinol (ZYLOPRIM) 100 MG tablet [Pharmacy Med Name: ALLOPURINOL 100 MG TABLET] 30 tablet 11     Sig: TAKE 1 TABLET BY MOUTH EVERY DAY  Last Written Prescription Date:  11/13/18  Last Fill Quantity: 90 tab,  # refills: 3   Last office visit: 10/29/2019 with prescribing provider:  Iveth   Future Office Visit:         Gout Agents Protocol Passed - 11/10/2019 12:50 AM        Passed - CBC on file in past 12 months     Recent Labs   Lab Test 10/29/19  0827   WBC 3.7*   RBC 4.75   HGB 15.0   HCT 43.6                    Passed - ALT on file in past 12 months     Recent Labs   Lab Test 10/29/19  0827   ALT 30             Passed - Has Uric Acid on file in past 12 months and value is less than 6     Recent Labs   Lab Test 10/29/19  0827   URIC 5.2     If level is 6mg/dL or greater, ok to refill one time and refer to provider.           Passed - Recent (12 mo) or future (30 days) visit within the authorizing provider's specialty     Patient has had an office visit with the authorizing provider or a provider within the authorizing providers department within the previous 12 mos or has a future within next 30 days. See \"Patient Info\" tab in inbasket, or \"Choose Columns\" in Meds & Orders section of the refill encounter.              Passed - Medication is active on med list        Passed - Patient is age 18 or older        Passed - Normal serum creatinine on file in the past 12 months     Recent Labs   Lab Test 10/29/19  0827   CR 0.85                "

## 2019-11-11 NOTE — TELEPHONE ENCOUNTER
Prescription approved per [facility] Refill Protocol.    Stefanie Polanco RN

## 2020-02-16 ENCOUNTER — HEALTH MAINTENANCE LETTER (OUTPATIENT)
Age: 65
End: 2020-02-16

## 2020-02-17 DIAGNOSIS — M10.9 ACUTE GOUTY ARTHRITIS: ICD-10-CM

## 2020-02-17 NOTE — TELEPHONE ENCOUNTER
Reason for Call:  Medication or medication refill:    Do you use a [facility]?  Name of the pharmacy and phone number for the current request:      [facility] [place], [place] - [address] [place] [address]      Name of the medication requested: allopurinol (ZYLOPRIM) 100 MG tablet    Other request: [facility] is calling for the refills    Can we leave a detailed message on this number? NO    Phone number patient can be reached at: Home number on file [phone] (home)    Best Time: anytime    Call taken on 2/17/2020 at 1:11 PM by Marcy Ramos

## 2020-02-18 NOTE — TELEPHONE ENCOUNTER
"allopurinol (ZYLOPRIM) 100 MG tablet    Last Written Prescription Date:  11/11/2019  Last Fill Quantity: 30,  # refills: 5   Last office visit: 10/29/2019 with prescribing provider:  Iveth   Future Office Visit:  10/29/2020    Requested Prescriptions   Pending Prescriptions Disp Refills     allopurinol (ZYLOPRIM) 100 MG tablet 30 tablet 5     Sig: Take 1 tablet (100 mg) by mouth daily       Gout Agents Protocol Passed - 2/17/2020  1:12 PM        Passed - CBC on file in past 12 months     Recent Labs   Lab Test 10/29/19  0827   WBC 3.7*   RBC 4.75   HGB 15.0   HCT 43.6                    Passed - ALT on file in past 12 months     Recent Labs   Lab Test 10/29/19  0827   ALT 30             Passed - Has Uric Acid on file in past 12 months and value is less than 6     Recent Labs   Lab Test 10/29/19  0827   URIC 5.2     If level is 6mg/dL or greater, ok to refill one time and refer to provider.           Passed - Recent (12 mo) or future (30 days) visit within the authorizing provider's specialty     Patient has had an office visit with the authorizing provider or a provider within the authorizing providers department within the previous 12 mos or has a future within next 30 days. See \"Patient Info\" tab in inbasket, or \"Choose Columns\" in Meds & Orders section of the refill encounter.              Passed - Medication is active on med list        Passed - Patient is age 18 or older        Passed - Normal serum creatinine on file in the past 12 months     Recent Labs   Lab Test 10/29/19  0827   CR 0.85               "

## 2020-02-19 RX ORDER — ALLOPURINOL 100 MG/1
100 TABLET ORAL DAILY
Qty: 90 TABLET | Refills: 1 | Status: SHIPPED | OUTPATIENT
Start: 2020-02-19 | End: 2020-07-27

## 2020-02-19 NOTE — TELEPHONE ENCOUNTER
Prescription approved per [facility] Refill Protocol.    Katia CASAS RN

## 2020-02-27 DIAGNOSIS — I25.10 CORONARY ARTERY DISEASE INVOLVING NATIVE CORONARY ARTERY OF NATIVE HEART WITHOUT ANGINA PECTORIS: ICD-10-CM

## 2020-02-28 RX ORDER — METOPROLOL SUCCINATE 25 MG/1
12.5 TABLET, EXTENDED RELEASE ORAL DAILY
Qty: 45 TABLET | Refills: 3 | Status: SHIPPED | OUTPATIENT
Start: 2020-02-28 | End: 2021-01-30

## 2020-02-28 NOTE — TELEPHONE ENCOUNTER
"metoprolol succinate ER (TOPROL-XL) 25 MG 24 hr tablet    Last Written Prescription Date:  1/4/2019  Last Fill Quantity: 45,  # refills: 3   Last office visit: 10/29/2019 with prescribing provider:  Dr. Lazaro    Future Office Visit:  Unknown     Requested Prescriptions   Pending Prescriptions Disp Refills     metoprolol succinate ER (TOPROL-XL) 25 MG 24 hr tablet 45 tablet 3     Sig: Take 0.5 tablets (12.5 mg) by mouth daily       Beta-Blockers Protocol Passed - 2/27/2020  6:29 PM        Passed - Blood pressure under 140/90 in past 12 months     BP Readings from Last 3 Encounters:   10/29/19 120/84   11/13/18 144/87   07/25/18 142/89                 Passed - Patient is age 6 or older        Passed - Recent (12 mo) or future (30 days) visit within the authorizing provider's specialty     Patient has had an office visit with the authorizing provider or a provider within the authorizing providers department within the previous 12 mos or has a future within next 30 days. See \"Patient Info\" tab in inbasket, or \"Choose Columns\" in Meds & Orders section of the refill encounter.              Passed - Medication is active on med list          "

## 2020-03-25 DIAGNOSIS — I25.10 CORONARY ARTERY DISEASE INVOLVING NATIVE CORONARY ARTERY OF NATIVE HEART WITHOUT ANGINA PECTORIS: ICD-10-CM

## 2020-03-25 NOTE — TELEPHONE ENCOUNTER
Reason for Call:  Other prescription    Detailed comments: Pt's wife called this morning and the pt is in need of a refill on his lipitor. The pt is almost out. Please refill this medication and if there are any questions please feel free to contact the patient at the number listed below. Thank you.    Phone Number Patient can be reached at: Home number on file [phone] (home)    Best Time:     Can we leave a detailed message on this number? YES    Call taken on 3/25/2020 at 8:41 AM by Lisa Garces

## 2020-03-31 RX ORDER — ATORVASTATIN CALCIUM 80 MG/1
80 TABLET, FILM COATED ORAL DAILY
Qty: 90 TABLET | Refills: 3 | Status: SHIPPED | OUTPATIENT
Start: 2020-03-31 | End: 2020-11-30

## 2020-11-04 DIAGNOSIS — M10.9 ACUTE GOUTY ARTHRITIS: ICD-10-CM

## 2020-11-04 NOTE — TELEPHONE ENCOUNTER
Patient's wife Marcy called today.    States patient is out of medication Colchicine for gout.    Would llike to pickup today at [facility] in [place] [address]    Please contact wife Maryc (if consent( at [phone].    Thank you.    Central Scheduling  Laquita STANTON

## 2020-11-05 RX ORDER — COLCHICINE 0.6 MG/1
TABLET ORAL
Qty: 30 TABLET | Refills: 3 | Status: SHIPPED | OUTPATIENT
Start: 2020-11-05 | End: 2022-03-30

## 2020-11-22 ENCOUNTER — HEALTH MAINTENANCE LETTER (OUTPATIENT)
Age: 65
End: 2020-11-22

## 2020-11-24 ENCOUNTER — APPOINTMENT (OUTPATIENT)
Dept: GENERAL RADIOLOGY | Facility: CLINIC | Age: 65
DRG: 282 | End: 2020-11-24
Attending: PHYSICIAN ASSISTANT
Payer: COMMERCIAL

## 2020-11-24 ENCOUNTER — HOSPITAL ENCOUNTER (INPATIENT)
Facility: CLINIC | Age: 65
LOS: 1 days | Discharge: HOME | DRG: 282 | End: 2020-11-25
Attending: EMERGENCY MEDICINE | Admitting: INTERNAL MEDICINE
Payer: COMMERCIAL

## 2020-11-24 ENCOUNTER — APPOINTMENT (OUTPATIENT)
Dept: CARDIOLOGY | Facility: CLINIC | Age: 65
DRG: 282 | End: 2020-11-24
Attending: INTERNAL MEDICINE
Payer: COMMERCIAL

## 2020-11-24 DIAGNOSIS — I25.10 CORONARY ARTERY DISEASE INVOLVING NATIVE CORONARY ARTERY OF NATIVE HEART WITHOUT ANGINA PECTORIS: Primary | ICD-10-CM

## 2020-11-24 DIAGNOSIS — I20.0 UNSTABLE ANGINA (H): ICD-10-CM

## 2020-11-24 DIAGNOSIS — I24.9 ACUTE CORONARY SYNDROME (H): ICD-10-CM

## 2020-11-24 PROBLEM — M10.9 GOUT: Status: ACTIVE | Noted: 2020-11-24

## 2020-11-24 PROBLEM — I21.4 NSTEMI (NON-ST ELEVATED MYOCARDIAL INFARCTION) (H): Status: ACTIVE | Noted: 2020-11-24

## 2020-11-24 LAB
ANION GAP SERPL CALCULATED.3IONS-SCNC: 3 MMOL/L (ref 3–14)
APTT PPP: 140 SEC (ref 22–37)
APTT PPP: 30 SEC (ref 22–37)
BASOPHILS # BLD AUTO: 0 10E9/L (ref 0–0.2)
BASOPHILS NFR BLD AUTO: 0.5 %
BUN SERPL-MCNC: 12 MG/DL (ref 7–30)
CALCIUM SERPL-MCNC: 9 MG/DL (ref 8.5–10.1)
CHLORIDE SERPL-SCNC: 106 MMOL/L (ref 94–109)
CO2 SERPL-SCNC: 29 MMOL/L (ref 20–32)
CREAT SERPL-MCNC: 0.82 MG/DL (ref 0.66–1.25)
D DIMER PPP FEU-MCNC: 0.3 UG/ML FEU (ref 0–0.5)
DIFFERENTIAL METHOD BLD: NORMAL
EOSINOPHIL # BLD AUTO: 0.2 10E9/L (ref 0–0.7)
EOSINOPHIL NFR BLD AUTO: 4.3 %
ERYTHROCYTE [DISTWIDTH] IN BLOOD BY AUTOMATED COUNT: 13.6 % (ref 10–15)
ERYTHROCYTE [DISTWIDTH] IN BLOOD BY AUTOMATED COUNT: 13.6 % (ref 10–15)
GFR SERPL CREATININE-BSD FRML MDRD: >90 ML/MIN/{1.73_M2}
GLUCOSE BLDC GLUCOMTR-MCNC: 73 MG/DL (ref 70–99)
GLUCOSE SERPL-MCNC: 66 MG/DL (ref 70–99)
HBA1C MFR BLD: 5 % (ref 0–5.6)
HCT VFR BLD AUTO: 43.8 % (ref 40–53)
HCT VFR BLD AUTO: 44.4 % (ref 40–53)
HGB BLD-MCNC: 15.4 G/DL (ref 13.3–17.7)
HGB BLD-MCNC: 15.5 G/DL (ref 13.3–17.7)
IMM GRANULOCYTES # BLD: 0 10E9/L (ref 0–0.4)
IMM GRANULOCYTES NFR BLD: 0.2 %
INTERPRETATION ECG - MUSE: NORMAL
LABORATORY COMMENT REPORT: NORMAL
LYMPHOCYTES # BLD AUTO: 1 10E9/L (ref 0.8–5.3)
LYMPHOCYTES NFR BLD AUTO: 22.3 %
MAGNESIUM SERPL-MCNC: 2.5 MG/DL (ref 1.6–2.3)
MCH RBC QN AUTO: 32 PG (ref 26.5–33)
MCH RBC QN AUTO: 32.3 PG (ref 26.5–33)
MCHC RBC AUTO-ENTMCNC: 34.9 G/DL (ref 31.5–36.5)
MCHC RBC AUTO-ENTMCNC: 35.2 G/DL (ref 31.5–36.5)
MCV RBC AUTO: 92 FL (ref 78–100)
MCV RBC AUTO: 92 FL (ref 78–100)
MONOCYTES # BLD AUTO: 0.3 10E9/L (ref 0–1.3)
MONOCYTES NFR BLD AUTO: 6.2 %
NEUTROPHILS # BLD AUTO: 2.9 10E9/L (ref 1.6–8.3)
NEUTROPHILS NFR BLD AUTO: 66.5 %
NRBC # BLD AUTO: 0 10*3/UL
NRBC BLD AUTO-RTO: 0 /100
PLATELET # BLD AUTO: 195 10E9/L (ref 150–450)
PLATELET # BLD AUTO: 203 10E9/L (ref 150–450)
POTASSIUM SERPL-SCNC: 4.2 MMOL/L (ref 3.4–5.3)
POTASSIUM SERPL-SCNC: 4.2 MMOL/L (ref 3.4–5.3)
RBC # BLD AUTO: 4.77 10E12/L (ref 4.4–5.9)
RBC # BLD AUTO: 4.84 10E12/L (ref 4.4–5.9)
SARS-COV-2 RNA SPEC QL NAA+PROBE: NEGATIVE
SARS-COV-2 RNA SPEC QL NAA+PROBE: NORMAL
SODIUM SERPL-SCNC: 138 MMOL/L (ref 133–144)
SPECIMEN SOURCE: NORMAL
SPECIMEN SOURCE: NORMAL
TROPONIN I BLD-MCNC: 0.04 UG/L (ref 0–0.08)
TROPONIN I SERPL-MCNC: 0.03 UG/L (ref 0–0.04)
TROPONIN I SERPL-MCNC: 0.04 UG/L (ref 0–0.04)
TROPONIN I SERPL-MCNC: 0.06 UG/L (ref 0–0.04)
TSH SERPL DL<=0.005 MIU/L-ACNC: 1.25 MU/L (ref 0.4–4)
WBC # BLD AUTO: 3.9 10E9/L (ref 4–11)
WBC # BLD AUTO: 4.4 10E9/L (ref 4–11)

## 2020-11-24 PROCEDURE — 85730 THROMBOPLASTIN TIME PARTIAL: CPT | Performed by: INTERNAL MEDICINE

## 2020-11-24 PROCEDURE — 96365 THER/PROPH/DIAG IV INF INIT: CPT

## 2020-11-24 PROCEDURE — 93306 TTE W/DOPPLER COMPLETE: CPT

## 2020-11-24 PROCEDURE — 84484 ASSAY OF TROPONIN QUANT: CPT

## 2020-11-24 PROCEDURE — 93005 ELECTROCARDIOGRAM TRACING: CPT

## 2020-11-24 PROCEDURE — 250N000013 HC RX MED GY IP 250 OP 250 PS 637: Performed by: PHYSICIAN ASSISTANT

## 2020-11-24 PROCEDURE — 999N001017 HC STATISTIC GLUCOSE BY METER IP

## 2020-11-24 PROCEDURE — B2181ZZ FLUOROSCOPY OF LEFT INTERNAL MAMMARY BYPASS GRAFT USING LOW OSMOLAR CONTRAST: ICD-10-PCS | Performed by: INTERNAL MEDICINE

## 2020-11-24 PROCEDURE — 93306 TTE W/DOPPLER COMPLETE: CPT | Mod: 26 | Performed by: INTERNAL MEDICINE

## 2020-11-24 PROCEDURE — B2131ZZ FLUOROSCOPY OF MULTIPLE CORONARY ARTERY BYPASS GRAFTS USING LOW OSMOLAR CONTRAST: ICD-10-PCS | Performed by: INTERNAL MEDICINE

## 2020-11-24 PROCEDURE — C1769 GUIDE WIRE: HCPCS | Performed by: INTERNAL MEDICINE

## 2020-11-24 PROCEDURE — 99152 MOD SED SAME PHYS/QHP 5/>YRS: CPT | Performed by: INTERNAL MEDICINE

## 2020-11-24 PROCEDURE — 83735 ASSAY OF MAGNESIUM: CPT | Performed by: INTERNAL MEDICINE

## 2020-11-24 PROCEDURE — 210N000002 HC R&B HEART CARE

## 2020-11-24 PROCEDURE — 93455 CORONARY ART/GRFT ANGIO S&I: CPT | Performed by: INTERNAL MEDICINE

## 2020-11-24 PROCEDURE — 99153 MOD SED SAME PHYS/QHP EA: CPT | Performed by: INTERNAL MEDICINE

## 2020-11-24 PROCEDURE — 85027 COMPLETE CBC AUTOMATED: CPT | Performed by: INTERNAL MEDICINE

## 2020-11-24 PROCEDURE — 99285 EMERGENCY DEPT VISIT HI MDM: CPT | Mod: 25

## 2020-11-24 PROCEDURE — 258N000003 HC RX IP 258 OP 636: Performed by: INTERNAL MEDICINE

## 2020-11-24 PROCEDURE — 84443 ASSAY THYROID STIM HORMONE: CPT | Performed by: INTERNAL MEDICINE

## 2020-11-24 PROCEDURE — 99223 1ST HOSP IP/OBS HIGH 75: CPT | Mod: AI | Performed by: INTERNAL MEDICINE

## 2020-11-24 PROCEDURE — 80048 BASIC METABOLIC PNL TOTAL CA: CPT | Performed by: PHYSICIAN ASSISTANT

## 2020-11-24 PROCEDURE — 36415 COLL VENOUS BLD VENIPUNCTURE: CPT | Performed by: INTERNAL MEDICINE

## 2020-11-24 PROCEDURE — 93010 ELECTROCARDIOGRAM REPORT: CPT | Performed by: INTERNAL MEDICINE

## 2020-11-24 PROCEDURE — B2111ZZ FLUOROSCOPY OF MULTIPLE CORONARY ARTERIES USING LOW OSMOLAR CONTRAST: ICD-10-PCS | Performed by: INTERNAL MEDICINE

## 2020-11-24 PROCEDURE — 96366 THER/PROPH/DIAG IV INF ADDON: CPT

## 2020-11-24 PROCEDURE — 250N000011 HC RX IP 250 OP 636: Performed by: EMERGENCY MEDICINE

## 2020-11-24 PROCEDURE — U0003 INFECTIOUS AGENT DETECTION BY NUCLEIC ACID (DNA OR RNA); SEVERE ACUTE RESPIRATORY SYNDROME CORONAVIRUS 2 (SARS-COV-2) (CORONAVIRUS DISEASE [COVID-19]), AMPLIFIED PROBE TECHNIQUE, MAKING USE OF HIGH THROUGHPUT TECHNOLOGIES AS DESCRIBED BY CMS-2020-01-R: HCPCS | Performed by: EMERGENCY MEDICINE

## 2020-11-24 PROCEDURE — 250N000009 HC RX 250: Performed by: INTERNAL MEDICINE

## 2020-11-24 PROCEDURE — 85025 COMPLETE CBC W/AUTO DIFF WBC: CPT | Performed by: PHYSICIAN ASSISTANT

## 2020-11-24 PROCEDURE — 250N000011 HC RX IP 250 OP 636: Performed by: INTERNAL MEDICINE

## 2020-11-24 PROCEDURE — 96376 TX/PRO/DX INJ SAME DRUG ADON: CPT

## 2020-11-24 PROCEDURE — 71046 X-RAY EXAM CHEST 2 VIEWS: CPT

## 2020-11-24 PROCEDURE — 84484 ASSAY OF TROPONIN QUANT: CPT | Performed by: PHYSICIAN ASSISTANT

## 2020-11-24 PROCEDURE — C1894 INTRO/SHEATH, NON-LASER: HCPCS | Performed by: INTERNAL MEDICINE

## 2020-11-24 PROCEDURE — 272N000001 HC OR GENERAL SUPPLY STERILE: Performed by: INTERNAL MEDICINE

## 2020-11-24 PROCEDURE — 83036 HEMOGLOBIN GLYCOSYLATED A1C: CPT | Performed by: INTERNAL MEDICINE

## 2020-11-24 PROCEDURE — 84132 ASSAY OF SERUM POTASSIUM: CPT | Performed by: INTERNAL MEDICINE

## 2020-11-24 PROCEDURE — C9803 HOPD COVID-19 SPEC COLLECT: HCPCS

## 2020-11-24 PROCEDURE — 85379 FIBRIN DEGRADATION QUANT: CPT | Performed by: INTERNAL MEDICINE

## 2020-11-24 PROCEDURE — 84484 ASSAY OF TROPONIN QUANT: CPT | Performed by: INTERNAL MEDICINE

## 2020-11-24 PROCEDURE — 99222 1ST HOSP IP/OBS MODERATE 55: CPT | Mod: 25 | Performed by: INTERNAL MEDICINE

## 2020-11-24 RX ORDER — ATROPINE SULFATE 0.1 MG/ML
0.5 INJECTION INTRAVENOUS
Status: ACTIVE | OUTPATIENT
Start: 2020-11-24 | End: 2020-11-24

## 2020-11-24 RX ORDER — FENTANYL CITRATE 50 UG/ML
25-50 INJECTION, SOLUTION INTRAMUSCULAR; INTRAVENOUS
Status: ACTIVE | OUTPATIENT
Start: 2020-11-24 | End: 2020-11-24

## 2020-11-24 RX ORDER — ALLOPURINOL 100 MG/1
100 TABLET ORAL DAILY
Status: DISCONTINUED | OUTPATIENT
Start: 2020-11-25 | End: 2020-11-25 | Stop reason: HOSPADM

## 2020-11-24 RX ORDER — VERAPAMIL HYDROCHLORIDE 2.5 MG/ML
INJECTION, SOLUTION INTRAVENOUS
Status: DISCONTINUED | OUTPATIENT
Start: 2020-11-24 | End: 2020-11-24 | Stop reason: HOSPADM

## 2020-11-24 RX ORDER — NALOXONE HYDROCHLORIDE 0.4 MG/ML
0.2 INJECTION, SOLUTION INTRAMUSCULAR; INTRAVENOUS; SUBCUTANEOUS
Status: DISCONTINUED | OUTPATIENT
Start: 2020-11-24 | End: 2020-11-25 | Stop reason: HOSPADM

## 2020-11-24 RX ORDER — ACETAMINOPHEN 325 MG/1
650 TABLET ORAL EVERY 4 HOURS PRN
Status: DISCONTINUED | OUTPATIENT
Start: 2020-11-24 | End: 2020-11-25 | Stop reason: HOSPADM

## 2020-11-24 RX ORDER — LORAZEPAM 2 MG/ML
0.5 INJECTION INTRAMUSCULAR
Status: DISCONTINUED | OUTPATIENT
Start: 2020-11-24 | End: 2020-11-24 | Stop reason: HOSPADM

## 2020-11-24 RX ORDER — NALOXONE HYDROCHLORIDE 0.4 MG/ML
0.4 INJECTION, SOLUTION INTRAMUSCULAR; INTRAVENOUS; SUBCUTANEOUS
Status: DISCONTINUED | OUTPATIENT
Start: 2020-11-24 | End: 2020-11-25 | Stop reason: HOSPADM

## 2020-11-24 RX ORDER — SODIUM CHLORIDE 9 MG/ML
INJECTION, SOLUTION INTRAVENOUS CONTINUOUS
Status: DISCONTINUED | OUTPATIENT
Start: 2020-11-24 | End: 2020-11-25 | Stop reason: HOSPADM

## 2020-11-24 RX ORDER — SODIUM CHLORIDE 9 MG/ML
INJECTION, SOLUTION INTRAVENOUS CONTINUOUS
Status: DISCONTINUED | OUTPATIENT
Start: 2020-11-24 | End: 2020-11-24 | Stop reason: HOSPADM

## 2020-11-24 RX ORDER — FLUMAZENIL 0.1 MG/ML
0.2 INJECTION, SOLUTION INTRAVENOUS
Status: ACTIVE | OUTPATIENT
Start: 2020-11-24 | End: 2020-11-24

## 2020-11-24 RX ORDER — POLYETHYLENE GLYCOL 3350 17 G/17G
17 POWDER, FOR SOLUTION ORAL DAILY
Status: DISCONTINUED | OUTPATIENT
Start: 2020-11-24 | End: 2020-11-25 | Stop reason: HOSPADM

## 2020-11-24 RX ORDER — ACETAMINOPHEN 325 MG/1
650 TABLET ORAL EVERY 4 HOURS PRN
Status: DISCONTINUED | OUTPATIENT
Start: 2020-11-24 | End: 2020-11-24

## 2020-11-24 RX ORDER — NALOXONE HYDROCHLORIDE 0.4 MG/ML
.2-.4 INJECTION, SOLUTION INTRAMUSCULAR; INTRAVENOUS; SUBCUTANEOUS
Status: ACTIVE | OUTPATIENT
Start: 2020-11-24 | End: 2020-11-24

## 2020-11-24 RX ORDER — POTASSIUM CHLORIDE 1500 MG/1
20 TABLET, EXTENDED RELEASE ORAL
Status: DISCONTINUED | OUTPATIENT
Start: 2020-11-24 | End: 2020-11-24 | Stop reason: HOSPADM

## 2020-11-24 RX ORDER — ASPIRIN 325 MG
325 TABLET ORAL ONCE
Status: COMPLETED | OUTPATIENT
Start: 2020-11-24 | End: 2020-11-24

## 2020-11-24 RX ORDER — FENTANYL CITRATE 50 UG/ML
INJECTION, SOLUTION INTRAMUSCULAR; INTRAVENOUS
Status: DISCONTINUED | OUTPATIENT
Start: 2020-11-24 | End: 2020-11-24 | Stop reason: HOSPADM

## 2020-11-24 RX ORDER — AMOXICILLIN 250 MG
2 CAPSULE ORAL 2 TIMES DAILY
Status: DISCONTINUED | OUTPATIENT
Start: 2020-11-24 | End: 2020-11-25 | Stop reason: HOSPADM

## 2020-11-24 RX ORDER — LIDOCAINE 40 MG/G
CREAM TOPICAL
Status: DISCONTINUED | OUTPATIENT
Start: 2020-11-24 | End: 2020-11-25 | Stop reason: HOSPADM

## 2020-11-24 RX ORDER — ONDANSETRON 4 MG/1
4 TABLET, ORALLY DISINTEGRATING ORAL EVERY 6 HOURS PRN
Status: DISCONTINUED | OUTPATIENT
Start: 2020-11-24 | End: 2020-11-25 | Stop reason: HOSPADM

## 2020-11-24 RX ORDER — ONDANSETRON 2 MG/ML
4 INJECTION INTRAMUSCULAR; INTRAVENOUS EVERY 6 HOURS PRN
Status: DISCONTINUED | OUTPATIENT
Start: 2020-11-24 | End: 2020-11-25 | Stop reason: HOSPADM

## 2020-11-24 RX ORDER — MORPHINE SULFATE 2 MG/ML
1 INJECTION, SOLUTION INTRAMUSCULAR; INTRAVENOUS
Status: DISCONTINUED | OUTPATIENT
Start: 2020-11-24 | End: 2020-11-25 | Stop reason: HOSPADM

## 2020-11-24 RX ORDER — PROCHLORPERAZINE MALEATE 5 MG
5 TABLET ORAL EVERY 6 HOURS PRN
Status: DISCONTINUED | OUTPATIENT
Start: 2020-11-24 | End: 2020-11-25 | Stop reason: HOSPADM

## 2020-11-24 RX ORDER — ATORVASTATIN CALCIUM 80 MG/1
80 TABLET, FILM COATED ORAL DAILY
Status: DISCONTINUED | OUTPATIENT
Start: 2020-11-25 | End: 2020-11-25 | Stop reason: HOSPADM

## 2020-11-24 RX ORDER — PROCHLORPERAZINE 25 MG
12.5 SUPPOSITORY, RECTAL RECTAL EVERY 12 HOURS PRN
Status: DISCONTINUED | OUTPATIENT
Start: 2020-11-24 | End: 2020-11-25 | Stop reason: HOSPADM

## 2020-11-24 RX ORDER — AMOXICILLIN 250 MG
1 CAPSULE ORAL 2 TIMES DAILY
Status: DISCONTINUED | OUTPATIENT
Start: 2020-11-24 | End: 2020-11-25 | Stop reason: HOSPADM

## 2020-11-24 RX ORDER — ASPIRIN 81 MG/1
324 TABLET, CHEWABLE ORAL ONCE
Status: DISCONTINUED | OUTPATIENT
Start: 2020-11-24 | End: 2020-11-24 | Stop reason: CLARIF

## 2020-11-24 RX ORDER — HEPARIN SODIUM 1000 [USP'U]/ML
INJECTION, SOLUTION INTRAVENOUS; SUBCUTANEOUS
Status: DISCONTINUED | OUTPATIENT
Start: 2020-11-24 | End: 2020-11-24 | Stop reason: HOSPADM

## 2020-11-24 RX ORDER — NITROGLYCERIN 5 MG/ML
VIAL (ML) INTRAVENOUS
Status: DISCONTINUED | OUTPATIENT
Start: 2020-11-24 | End: 2020-11-24 | Stop reason: HOSPADM

## 2020-11-24 RX ORDER — HEPARIN SODIUM 10000 [USP'U]/100ML
0-5000 INJECTION, SOLUTION INTRAVENOUS CONTINUOUS
Status: DISCONTINUED | OUTPATIENT
Start: 2020-11-24 | End: 2020-11-24

## 2020-11-24 RX ORDER — ASPIRIN 81 MG/1
81 TABLET, CHEWABLE ORAL DAILY
Status: DISCONTINUED | OUTPATIENT
Start: 2020-11-25 | End: 2020-11-25 | Stop reason: HOSPADM

## 2020-11-24 RX ORDER — IOPAMIDOL 755 MG/ML
INJECTION, SOLUTION INTRAVASCULAR
Status: DISCONTINUED | OUTPATIENT
Start: 2020-11-24 | End: 2020-11-24 | Stop reason: HOSPADM

## 2020-11-24 RX ORDER — ACETAMINOPHEN 650 MG/1
650 SUPPOSITORY RECTAL EVERY 4 HOURS PRN
Status: DISCONTINUED | OUTPATIENT
Start: 2020-11-24 | End: 2020-11-25 | Stop reason: HOSPADM

## 2020-11-24 RX ORDER — LIDOCAINE 40 MG/G
CREAM TOPICAL
Status: DISCONTINUED | OUTPATIENT
Start: 2020-11-24 | End: 2020-11-24

## 2020-11-24 RX ORDER — LORAZEPAM 0.5 MG/1
0.5 TABLET ORAL
Status: DISCONTINUED | OUTPATIENT
Start: 2020-11-24 | End: 2020-11-24 | Stop reason: HOSPADM

## 2020-11-24 RX ADMIN — ASPIRIN 325 MG ORAL TABLET 325 MG: 325 PILL ORAL at 11:22

## 2020-11-24 RX ADMIN — HEPARIN SODIUM 950 UNITS/HR: 10000 INJECTION, SOLUTION INTRAVENOUS at 10:09

## 2020-11-24 RX ADMIN — SODIUM CHLORIDE: 9 INJECTION, SOLUTION INTRAVENOUS at 15:47

## 2020-11-24 RX ADMIN — SODIUM CHLORIDE: 9 INJECTION, SOLUTION INTRAVENOUS at 13:51

## 2020-11-24 ASSESSMENT — MIFFLIN-ST. JEOR
SCORE: 1585.04
SCORE: 1605

## 2020-11-24 ASSESSMENT — ENCOUNTER SYMPTOMS
COUGH: 0
NAUSEA: 0
DIZZINESS: 0
CHILLS: 0
FEVER: 0
LIGHT-HEADEDNESS: 0
VOMITING: 0
ABDOMINAL PAIN: 0

## 2020-11-24 ASSESSMENT — ACTIVITIES OF DAILY LIVING (ADL)
ADLS_ACUITY_SCORE: 13
ADLS_ACUITY_SCORE: 10
ADLS_ACUITY_SCORE: 10

## 2020-11-24 NOTE — PRE-PROCEDURE
GENERAL PRE-PROCEDURE:     Written consent obtained?: Yes    Risks and benefits: Risks, benefits and alternatives were discussed    Consent given by:  Patient  Patient states understanding of procedure being performed: Yes    Patient's understanding of procedure matches consent: Yes    Procedure consent matches procedure scheduled: Yes    Expected level of sedation:  Moderate  Appropriately NPO:  Yes  ASA Class:  Class 1- healthy patient  Mallampati  :  Grade 2- soft palate, base of uvula, tonsillar pillars, and portion of posterior pharyngeal wall visible  Lungs:  Lungs clear with good breath sounds bilaterally  Heart:  Normal heart sounds and rate  History & Physical reviewed:  History and physical reviewed and no updates needed  Statement of review:  I have reviewed the lab findings, diagnostic data, medications, and the plan for sedation

## 2020-11-24 NOTE — ED NOTES
"[facility]  ED Nurse Handoff Report    ED Chief complaint: Chest Pain      ED Diagnosis:   Final diagnoses:   Acute coronary syndrome (H)   Unstable angina (H)       Code Status: Full Code    Allergies:   Allergies   Allergen Reactions     No Known Allergies        Patient Story: Pt reports he has had exertional chest pain since mid October. Has progressed into minimal exercise is causing pain.   Focused Assessment:  None presents at eval time    Treatments and/or interventions provided: heparin, unchanged  Patient's response to treatments and/or interventions:  unchanged    To be done/followed up on inpatient unit:  Cont to monitor    Does this patient have any cognitive concerns?: None    Activity level - Baseline/Home:  Independent  Activity Level - Current:   Independent    Patient's Preferred language: English   Needed?: No    Isolation: None  Infection: Not Applicable  Patient tested for COVID 19 prior to admission: YES  Bariatric?: No    Vital Signs:   Vitals:    11/24/20 0816   BP: (!) 149/93   Pulse: 63   Resp: 16   Temp: 97.7  F (36.5  C)   TempSrc: Oral   SpO2: 98%   Weight: 80.3 kg (177 lb)   Height: 1.803 m (5' 11\")       Cardiac Rhythm:     Was the PSS-3 completed:   Yes  What interventions are required if any?               Family Comments: Wife available by phone  OBS brochure/video discussed/provided to patient/family: Yes              Name of person given brochure if not patient: NA              Relationship to patient: NA    For the majority of the shift this patient's behavior was Green.   Behavioral interventions performed were None nEeded.    ED NURSE PHONE NUMBER: [id number]         "

## 2020-11-24 NOTE — H&P
[facility]    History and Physical  Hospitalist       Date of Admission:  11/24/2020    Assessment & Plan     Odessa Elliott is a 65 year old male with a history of coronary disease status post 5 vessel CABG, dyslipidemia, history of gout, history of GI bleed who presents with progressive exertional chest pain.  Initial troponin slightly elevated.  EKG without ischemic changes.  Patient chest pain-free.  Normal exam.  Concern for unstable angina.    Principal Problem:  NSTEMI    Acute coronary syndrome (H)   Coronary artery disease   S/P CABG x 5  Dyslipidemia  Chest pain    Assessment:   -Patient presents with approximately 7 weeks of new exertional substernal burning chest discomfort which regressed over the last 1 to 2 days.  Patient now has similar symptoms with much less exertion.  -Normal exam.  Normal vital signs.  Afebrile.  -Initial EKG without acute ischemic changes.  CBC normal.  BMP normal.  Initial troponin 0.055  -Chest pain-free in the emergency room.  -NSTEMI     Plan: trend troponins, telemetry, repeat EKG on the floor, cardiology consult, keep n.p.o., rapid asymptomatic Covid swab for procedures, continue metoprolol, continue aspirin, a.m. labs, echocardiogram, statin, and hemoglobin A1c and lipid panel      Gout    Assessment: Allopurinol and as needed colchicine.  No recent gout attacks.  No signs of gout.    Plan: New allopurinol.  Patient has PCP follow-up next Monday.    Asymptomatic Covid testing.  Covid PCR swab obtained in the emergency room for procedures.  Suspicion for Covid.    DVT Prophylaxis: On heparin drip  Code Status: Full code, discussed on admission    Disposition: Expected discharge in approximately 2 days, to be determined, once cardiology evaluation has been completed    She already has PCP follow-up scheduled for this coming Monday    Henrry Steen MD    Primary Care Physician   Mikie Lazaro MD    Chief Complaint   Chest  pressure    History is obtained from the patient chart, ED the    History of Present Illness   Odessa Elliott is a 65 year old male with a history of coronary disease status post 5 vessel CABG, dyslipidemia, history of gout, history of GI bleed who presents with progressive exertional chest pain.    Patient states that he began developing substernal burning-like chest pain with exercise approximately 7 weeks ago.  He would notice this when going up hills on his bike where he would require slightly more exertion than usual.  This would resolve with going on level ground or stopping exercise.  No radiation to the abdomen neck jaw or arm.  No shortness of breath.  Was relatively stable than yesterday while biking on flat ground he noticed the same substernal burning-like pain.  He also noted it with walking up a hill with his wife and while working in the yard scooping up leaves into a tarp.  In the emergency room he is chest pain-free.  He has been chest pain-free at rest.  He does not have any nitroglycerin at home.    He does not have the symptoms at rest or with eating.  No dysphagia odynophagia.  No fevers chills cough myalgias ENT symptoms.  No bleeding.  No history of peptic ulcer disease or GI bleed.  No hematuria.  Palpitations or falls or dizziness.    [facility] ER for further evaluation.  Patient slightly hypertensive initially but now normotensive.  Normal oxygen saturations.  Afebrile.  Pulse in the 50s to 60s.  Labs notable for initial troponin 0.055.  Glucose 66.  BMP normal.  CBC normal.  Normal white blood cell count and hemoglobin and platelet count.  Normal differential.  EKG shows normal sinus rhythm not acute ischemic changes.  No ST-T wave changes.  In the emergency room patient was initiated on aspirin and heparin drip.  concern for unstable angina.  Patient has been chest pain free in the emergency room.    EDMD talk to cardiology and plan is for likely angiogram this afternoon  once Covid test confirmed negative.        Past medical history  I have reviewed this patient's medical history and updated it with pertinent information if needed.   Past Medical History:   Diagnosis Date     Acute gouty arthritis      Chest pain      Coronary artery disease     s/p 5v CABG     Hyperlipidaemia      Shortness of breath      Urinary retention        Past Surgical History   I have reviewed this patient's surgical history and updated it with pertinent information if needed.  Past Surgical History:   Procedure Laterality Date     BYPASS GRAFT ARTERY CORONARY  9/23/2013    Procedure: BYPASS GRAFT ARTERY CORONARY;  CORONARY ARTERY BYPASS GRAFT X 5  WITH ENDOSCOPIC VEIN HARVESTING (LAD, PDA, SIMRAN, OM1 AND OM2 );  Surgeon: Kwadwo Canales MD;  Location: [facility] OR     COLONOSCOPY  10/2013    Shortly after bypass surgery     CORONARY ARTERY BYPASS  2013    LIMA =LAD,svg=PDA,Svg=PLRca, Svg=Om1,Svg=OM2     NO HISTORY OF SURGERY       ORTHOPEDIC SURGERY      cortisone shot in back for pain       Prior to Admission Medications   Prior to Admission Medications   Prescriptions Last Dose Informant Patient Reported? Taking?   Calcium Carbonate (CALCIUM 600 PO)   Yes No   Sig: Take 1 tablet by mouth daily   Coenzyme Q10 (COQ-10) 100 MG CAPS   Yes No   Sig: Take 1 tablet by mouth daily   Multiple Vitamin (MULTI VITAMIN DAILY PO)   Yes No   Omega-3 Fatty Acids (FISH OIL PO)   Yes No   Sig: Take by mouth daily   Vitamin D, Cholecalciferol, 1000 UNITS TABS   Yes No   Sig: Take 1,000 Units by mouth    allopurinol (ZYLOPRIM) 100 MG tablet   No No   Sig: TAKE 1 TABLET DAILY   aspirin 81 MG tablet   Yes No   Sig: Take 81 mg by mouth daily   atorvastatin (LIPITOR) 80 MG tablet   No No   Sig: Take 1 tablet (80 mg) by mouth daily   colchicine (COLCRYS) 0.6 MG tablet   No No   Sig: Take 2 tabs (1.2mg) by mouth at the first sign of a gout attack, then 1 tab (0.6mg) 1 hour later.  Max 3 tabs (1.8mg) over 1 hour.   metoprolol succinate ER  (TOPROL-XL) 25 MG 24 hr tablet   No No   Sig: Take 0.5 tablets (12.5 mg) by mouth daily      Facility-Administered Medications: None     Allergies   Allergies   Allergen Reactions     No Known Allergies        Social History   I have reviewed this patient's social history and updated it with pertinent information if needed. Odessa Elliott  reports that he has never smoked. He has never used smokeless tobacco. He reports current alcohol use of about 6.0 standard drinks of alcohol per week. He reports that he does not use drugs.    Family History   I have reviewed this patient's family history and updated it with pertinent information if needed.   Family History   Problem Relation Age of Onset     Lipids Mother      Hypertension Mother      Hyperlipidemia Mother      Cancer Paternal Grandmother      Hypertension Father      Hyperlipidemia Father      C.A.D. No family hx of      Hypertension No family hx of      Cerebrovascular Disease No family hx of        Review of Systems   The 10 point Review of Systems is negative other than noted in the HPI or here.     Physical Exam   Temp: 97.7  F (36.5  C) Temp src: Oral BP: (!) 149/93 Pulse: 63   Resp: 16 SpO2: 98 % O2 Device: None (Room air)    Vital Signs with Ranges  Temp:  [97.7  F (36.5  C)] 97.7  F (36.5  C)  Pulse:  [63] 63  Resp:  [16] 16  BP: (149)/(93) 149/93  SpO2:  [98 %] 98 %  177 lbs 0 oz    Constitutional: Cute distress,  Eyes: Pupils equal round react to light and accommodating, normal sclera  HEENT: Normal oropharynx  Respiratory: Clear to auscultation bilaterally  Cardiovascular: Regular rate and rhythm no rubs gallops or murmurs appreciated  GI: Soft nontender nondistended  Lymph/Hematologic: No cervical lymphadenopathy  Skin: No edema.  No rash.  No cyanosis  Musculoskeletal: No focal joint swelling erythema  Neurologic: Cranial nerves II through XII grossly intact, normal speech and mentation, strength of 5 in extremities x4  Psychiatric: nl affect    Data    Data reviewed today:  I personally reviewed laboratory studies, EKG performed the emergency room.  EKG shows normal sinus rhythm without acute ST-T wave changes.  There are normal intervals.  Recent Labs   Lab 11/24/20  0951 11/24/20  0850   WBC  --  4.4   HGB  --  15.5   MCV  --  92   PLT  --  195   NA  --  138   POTASSIUM  --  4.2   CHLORIDE  --  106   CO2  --  29   BUN  --  12   CR  --  0.82   ANIONGAP  --  3   MARU  --  9.0   GLC  --  66*   TROPI  --  0.055*   TROPONIN 0.04  --        Imaging:  No results found for this or any previous visit (from the past 24 hour(s)).

## 2020-11-24 NOTE — PROGRESS NOTES
RECEIVING UNIT ED HANDOFF REVIEW    ED Nurse Handoff Report was reviewed by: Troy Mcnair RN on November 24, 2020 at 11:38 AM

## 2020-11-24 NOTE — PHARMACY-ADMISSION MEDICATION HISTORY
Pharmacy Medication History  Admission medication history interview status for the 11/24/2020  admission is complete. See EPIC admission navigator for prior to admission medications       Medication history sources: Patient and Surescripts  Location of interview: phone  Medication history source reliability: Good  Adherence assessment: Good    Significant changes made to the medication list:  Removed coq10. Added collagen      Additional medication history information:   none    Medication reconciliation completed by provider prior to medication history? No    Time spent in this activity: 15 minutes      Prior to Admission medications    Medication Sig Last Dose Taking? Auth Provider   allopurinol (ZYLOPRIM) 100 MG tablet TAKE 1 TABLET DAILY 11/24/2020 at am Yes Mikie Lazaro MD   aspirin 81 MG tablet Take 81 mg by mouth daily 11/24/2020 at am Yes Reported, Patient   atorvastatin (LIPITOR) 80 MG tablet Take 1 tablet (80 mg) by mouth daily 11/24/2020 at am Yes Mikie Lazaro MD   Calcium Carbonate (CALCIUM 600 PO) Take 1 tablet by mouth daily 11/24/2020 at am Yes Reported, Patient   colchicine (COLCRYS) 0.6 MG tablet Take 2 tabs (1.2mg) by mouth at the first sign of a gout attack, then 1 tab (0.6mg) 1 hour later.  Max 3 tabs (1.8mg) over 1 hour. prn Yes Mikie Lazaro MD   metoprolol succinate ER (TOPROL-XL) 25 MG 24 hr tablet Take 0.5 tablets (12.5 mg) by mouth daily 11/24/2020 at am Yes Mikie Lazaro MD   Multiple Vitamin (MULTI VITAMIN DAILY PO) Take 1 tablet by mouth daily  11/24/2020 at am Yes Reported, Patient   NONFORMULARY Take by mouth daily Collagen 1 scoop 11/23/2020 at Unknown time Yes Unknown, Entered By History   Omega-3 Fatty Acids (FISH OIL PO) Take 1 capsule by mouth daily  11/24/2020 at am Yes Reported, Patient   Vitamin D, Cholecalciferol, 1000 UNITS TABS Take 1,000 Units by mouth daily  11/24/2020 at am Yes Reported, Patient

## 2020-11-24 NOTE — PROVIDER NOTIFICATION
MD Notification    Notified Person: MD    Notified Person Name: Dr. Steen    Notification Date/Time:11/24/20 6634    Notification Interaction: telephone    Purpose of Notification: Critical PTT, pt on heparin.    Orders Received:Not sure why it was ordered. Call pharmacy.    Comments: Not needed for heparin adjustments. Will disregard.

## 2020-11-24 NOTE — CONSULTS
Cardiology Consultation      Odessa Elliott MRN# [id number]   YOB: 1955 Age: 65 year old   Date of Admission: 11/24/2020     Reason for consult: NSTEMI           Assessment and Plan:     65-year-old gentleman with past medical history significant for coronary artery disease status post coronary artery bypass grafting x5 in 09/2013 (LIMA-LAD, SVG-PDA, SVG-PL, SVG-OM1, SVG- OM2), admitted with exertional chest discomfort over the past several weeks.  This is progressed to the point where it is occurring with minimal exertion.  Cardiac troponins are elevated consistent with a non-ST elevation myocardial infarction.      1. NSTEMI  2. CAD s/p CABG 2013 as described above  3. Dyslipidemia      PLAN  -Coronary angiography today.  I have explained the indications, risks and benefits of coronary angiography to the patient in detail.  -Continue current medical therapy  - Echocardiogram at bedside demonstrates normal LVEF, no effusion. Will await formal read.              Chief Complaint:   Chest Pain           History of Present Illness:   This patient is a 65 year old male who is followed by Dr Perez in our cardiology clinic, who he last saw in 08/2017.  He has a history of coronary artery disease and is status post coronary artery bypass grafting x5 as described above in September 2013.  At that time he was experiencing chest discomfort and a stress test was abnormal suggestive of inferior wall ischemia.  Subsequent coronary angiography demonstrated severe three-vessel coronary artery disease leading to bypass surgery.    He has done very well since then, although he has not seen Dr. Perez since 2017.  His last echocardiogram in June 2015 demonstrated normal left ventricular systolic function with no significant valvular disease.    For the last several weeks he is experiencing chest heaviness with exertion (primarily biking).  This has progressed to the point where he is experiencing discomfort with minimal  "exertion.  He presented to the emergency department for further evaluation where he was found to have mildly elevated troponins consistent with a non-ST elevation myocardial infarction.         Physical Exam:   Vitals were reviewed  Blood pressure (!) 149/93, pulse 63, temperature 97.7  F (36.5  C), temperature source Oral, resp. rate 16, height 1.803 m (5' 11\"), weight 80.3 kg (177 lb), SpO2 98 %.  Temperatures:  Current - Temp: 97.7  F (36.5  C); Max - Temp  Av.7  F (36.5  C)  Min: 97.7  F (36.5  C)  Max: 97.7  F (36.5  C)  Respiration range: Resp  Av  Min: 16  Max: 16  Pulse range: Pulse  Av  Min: 63  Max: 63  Blood pressure range: Systolic (24hrs), Av , Min:149 , Max:149   ; Diastolic (24hrs), Av, Min:93, Max:93    Pulse oximetry range: SpO2  Av %  Min: 98 %  Max: 98 %  No intake or output data in the 24 hours ending 20 1027  Constitutional:   awake, alert, cooperative, no apparent distress, and appears stated age     Eyes:   Lids and lashes normal, pupils equal, round and reactive to light, extra ocular muscles intact, sclera clear, conjunctiva normal     Neck:   supple, symmetrical, trachea midline, no JVD     Back:   symmetric     Lungs:   No increased work of breathing, good air exchange, clear to auscultation bilaterally, no crackles or wheezing     Cardiovascular:   Normal apical impulse, regular rate and rhythm, normal S1 and S2, no S3 or S4, and no murmur noted.        Abdomen:   non-tender     Musculoskeletal:   motor strength is 5 out of 5 all extremities bilaterally     Neurologic:   Grossly nonfocal     Skin:   no bruising or bleeding     Additional findings:     No edema               Past Medical History:   I have reviewed this patient's past medical history  Past Medical History:   Diagnosis Date     Acute gouty arthritis      Chest pain      Coronary artery disease     s/p 5v CABG     Hyperlipidaemia      Shortness of breath      Urinary retention              " Past Surgical History:   I have reviewed this patient's past surgical history  Past Surgical History:   Procedure Laterality Date     BYPASS GRAFT ARTERY CORONARY  9/23/2013    Procedure: BYPASS GRAFT ARTERY CORONARY;  CORONARY ARTERY BYPASS GRAFT X 5  WITH ENDOSCOPIC VEIN HARVESTING (LAD, PDA, SIMRAN, OM1 AND OM2 );  Surgeon: Kwadwo Canales MD;  Location: [facility] OR     COLONOSCOPY  10/2013    Shortly after bypass surgery     CORONARY ARTERY BYPASS  2013    LIMA =LAD,svg=PDA,Svg=PLRca, Svg=Om1,Svg=OM2     NO HISTORY OF SURGERY       ORTHOPEDIC SURGERY      cortisone shot in back for pain               Social History:   I have reviewed this patient's social history  Social History     Tobacco Use     Smoking status: Never Smoker     Smokeless tobacco: Never Used   Substance Use Topics     Alcohol use: Yes     Alcohol/week: 6.0 standard drinks     Comment: ocassional beer or wine.             Family History:   I have reviewed this patient's family history  Family History   Problem Relation Age of Onset     Lipids Mother      Hypertension Mother      Hyperlipidemia Mother      Cancer Paternal Grandmother      Hypertension Father      Hyperlipidemia Father      C.A.D. No family hx of      Hypertension No family hx of      Cerebrovascular Disease No family hx of              Allergies:     Allergies   Allergen Reactions     No Known Allergies              Medications:   I have reviewed this patient's current medications  (Not in a hospital admission)    Current Facility-Administered Medications Ordered in Epic   Medication Dose Route Frequency Last Rate Last Admin     aspirin (ASA) tablet 325 mg  325 mg Oral Once         heparin 25,000 units in 0.45% NaCl 250 mL ANTICOAGULANT  infusion  0-5,000 Units/hr Intravenous Continuous 9.5 mL/hr at 11/24/20 1009 950 Units/hr at 11/24/20 1009     Current Outpatient Medications Ordered in Epic   Medication     allopurinol (ZYLOPRIM) 100 MG tablet     aspirin 81 MG tablet     atorvastatin  (LIPITOR) 80 MG tablet     Calcium Carbonate (CALCIUM 600 PO)     Coenzyme Q10 (COQ-10) 100 MG CAPS     colchicine (COLCRYS) 0.6 MG tablet     metoprolol succinate ER (TOPROL-XL) 25 MG 24 hr tablet     Multiple Vitamin (MULTI VITAMIN DAILY PO)     Omega-3 Fatty Acids (FISH OIL PO)     Vitamin D, Cholecalciferol, 1000 UNITS TABS             Review of Systems:   The 10 point Review of Systems is negative other than noted in the HPI            Data:   All laboratory data reviewed  Results for orders placed or performed during the hospital encounter of 11/24/20 (from the past 24 hour(s))   EKG 12-lead, tracing only   Result Value Ref Range    Interpretation ECG Click View Image link to view waveform and result    CBC with platelets differential   Result Value Ref Range    WBC 4.4 4.0 - 11.0 10e9/L    RBC Count 4.84 4.4 - 5.9 10e12/L    Hemoglobin 15.5 13.3 - 17.7 g/dL    Hematocrit 44.4 40.0 - 53.0 %    MCV 92 78 - 100 fl    MCH 32.0 26.5 - 33.0 pg    MCHC 34.9 31.5 - 36.5 g/dL    RDW 13.6 10.0 - 15.0 %    Platelet Count 195 150 - 450 10e9/L    Diff Method Automated Method     % Neutrophils 66.5 %    % Lymphocytes 22.3 %    % Monocytes 6.2 %    % Eosinophils 4.3 %    % Basophils 0.5 %    % Immature Granulocytes 0.2 %    Nucleated RBCs 0 0 /100    Absolute Neutrophil 2.9 1.6 - 8.3 10e9/L    Absolute Lymphocytes 1.0 0.8 - 5.3 10e9/L    Absolute Monocytes 0.3 0.0 - 1.3 10e9/L    Absolute Eosinophils 0.2 0.0 - 0.7 10e9/L    Absolute Basophils 0.0 0.0 - 0.2 10e9/L    Abs Immature Granulocytes 0.0 0 - 0.4 10e9/L    Absolute Nucleated RBC 0.0    Basic metabolic panel   Result Value Ref Range    Sodium 138 133 - 144 mmol/L    Potassium 4.2 3.4 - 5.3 mmol/L    Chloride 106 94 - 109 mmol/L    Carbon Dioxide 29 20 - 32 mmol/L    Anion Gap 3 3 - 14 mmol/L    Glucose 66 (L) 70 - 99 mg/dL    Urea Nitrogen 12 7 - 30 mg/dL    Creatinine 0.82 0.66 - 1.25 mg/dL    GFR Estimate >90 >60 mL/min/[1.73_m2]    GFR Estimate If Black >90 >60  mL/min/[1.73_m2]    Calcium 9.0 8.5 - 10.1 mg/dL   Troponin I   Result Value Ref Range    Troponin I ES 0.055 (H) 0.000 - 0.045 ug/L   Troponin POCT   Result Value Ref Range    Troponin I 0.04 0.00 - 0.08 ug/L     EKG results: NSR with no acute ST T wave abnormalities.

## 2020-11-24 NOTE — ED TRIAGE NOTES
Chest pain with exertion since second week of October. Felt needed to check on it. Pain free at the moment. Describes the pain as a burning. Runny nose for six weeks and nausea since Sunday.

## 2020-11-24 NOTE — ED PROVIDER NOTES
Emergency Department Attending Supervision Note  11/24/2020  9:07 AM      I evaluated this patient in conjunction with an Advanced Practice Clinician:    JOE: [place]   Briefly, the patient presented with worsening exertional chest pain.  Please see the APC note for full details.  This patient underwent 5 vessel coronary artery bypass grafting about 5 years ago.  He has been maintained on baby aspirin.  He does take high-dose Lipitor and low-dose metoprolol.  His baseline heart rate is in the 60s at this time and he did take his beta-blocker this morning.  Patient notes over the last several weeks increasing chest pressure and heaviness with exertion.  This originally started off with biking pills later into his workout and now, as of yesterday, the chest discomfort occurred with even minimal exertion by pulling a small pile of leaves on a tarp.  He otherwise feels well.  He has no cardiac symptoms today.  The patient has no shortness of breath.    Examination:   General: Resting comfortably on the gurney  Head:  The scalp, face, and head appear normal  Eyes:  The pupils are normal    Conjunctivae and sclera appear normal  ENT:    The nose is normal    Ears/pinnae are normal  Lungs: Clear  CV:  Normal.  Heart rate is in the 60s at this time   neck:  Normal range of motion  MS:  Normal  Skin:  No rash or lesions noted.  Neuro: Speech is normal and fluent  Psych:  Awake. Alert.  Normal affect.      Appropriate interactions         Medical decision making  This patient presents with a history of coronary artery disease status post 5 vessel coronary artery bypass grafting.  The patient has had increasing exertional chest discomfort over the last several weeks, and in particular over the last 1 to 2 days the chest discomfort is occurring with extremely minimal exertion.  This meets the definition of unstable angina and is concerning for acute coronary syndrome.  The patient may well have a bypass vessel or native branch  vessel that is stenotic or has an unstable plaque.  The patient is given aspirin, he is already on a beta-blocker, he is heparinized.  His initial troponin despite no symptoms is positive.  The patient will be admitted to the hospitalist and cardiology has been consulted.  Asymptomatic rapid Covid testing will be performed in preparation for an angiogram later today.    My impression/diagnosis is:    Acute coronary syndrome  Unstable angina  Coronary artery disease  Hyperlipidemia    MD Дмитрий Lucas Michael P, MD  11/24/20 9963

## 2020-11-24 NOTE — ED PROVIDER NOTES
History     Chief Complaint:  Chest Pain    HPI   Odessa Elliott is a 65 year old male with a history of hyperlipidemia and CAD s/p 5 vessel  CABG who presents with chest pain. The patient has had chest pain intermittently since the second week of October.  Initially he experienced a burning sensation while biking hills, and since then he has had chest pain with minimal exertion. He last experienced the pain yesterday when biking and while raking leaves onto a tarp. The pain causes him to stop his activity due to the discomfort. The pain is across his chest and feels mostly like a burning sensation, but occasionally he has heaviness. The pain is localized and does not radiate to his jaw or arm. He is currently asymptomatic. He follows with cardiologist Dr. Perez who performed his CABG. He denies light headedness, dizziness, syncopal episodes, fever, chills, cough, abdominal pain, nausea, or vomiting. Of note, the patient is currently maintained on baby aspirin, high-dose Lipitor and low-dose metoprolol.     Cardiac Risk Factors   Sex: Male   Tobacco: Negative   Hypertension: Negative  Diabetes: Negative  Hyperlipidemia: Positive  Family History: Negative      PE/DVT Risk Factors   Personal History: Negative  Recent Travel: Negative   Recent Surgery/Hospitalization: Negative  Tobacco: Negative  Family History: Negative  Hormone Use: Negative   Cancer: Negative  Trauma: Negative        Allergies:  No Known Drug Allergies      Medications:    Aspirin  Lipitor  Coenzyme Q10  Colcrys  Toprol     Past Medical History:    CAD  Hyperlipidemia   Hearing loss     Past Surgical History:    CABG     Family History:    Hyperlipidemia   Hypertension      Social History:  Smoking status: Never  Alcohol use: Yes  Drug use: No  Patient presents alone.  PCP: Mikie Lazaro    Marital Status:        Review of Systems   Constitutional: Negative for chills and fever.   Respiratory: Negative for cough.    Cardiovascular:  "Positive for chest pain.   Gastrointestinal: Negative for abdominal pain, nausea and vomiting.   Neurological: Negative for dizziness, syncope and light-headedness.   All other systems are negative.  Physical Exam     Patient Vitals for the past 24 hrs:   BP Temp Temp src Pulse Resp SpO2 Height Weight   11/24/20 0816 (!) 149/93 97.7  F (36.5  C) Oral 63 16 98 % 1.803 m (5' 11\") 80.3 kg (177 lb)      Physical Exam  Vitals signs and nursing note reviewed.   Eyes:      General: No scleral icterus.     Extraocular Movements: Extraocular movements intact.      Conjunctiva/sclera: Conjunctivae normal.   Cardiovascular:      Rate and Rhythm: Regular rhythm. Normal Rate.     Pulses: Normal pulses.      Heart sounds: Normal heart sounds.   Pulmonary:      Effort: Pulmonary effort is normal.      Breath sounds: Normal breath sounds.   Abdominal:      General: Abdomen is flat. Bowel sounds are normal.      Palpations: Abdomen is soft.      Tenderness: There is no abdominal tenderness.   Musculoskeletal: Normal range of motion.      Right lower leg: No edema.      Left lower leg: No edema.   Skin:     General: Skin is warm and dry.   Neurological:      Mental Status: He is alert and oriented to person, place, and time.   Psychiatric:         Mood and Affect: Mood normal.         Behavior: Behavior normal.     Emergency Department Course   ECG:  @ 0814  Indication: Chest pain   Vent. Rate 63 bpm. IL interval 154 ms. QRS duration 96 ms. QT/QTc 422/431 ms. P-R-T axis 49 57 61.   Normal sinus rhythm. Minimal voltage criteria for LVH, may be normal variant. Borderline ECG.    Read @ 0824 by Dr. Bran.     Interventions:  Chest XR Port 1 View:  Pending at the time of admission.    Laboratory:  CBC: WBC 4.4, HGB 15.5,      BMP: Glucose 66 (L), o/w WNL (Creatinine: 0.82)     0850 Troponin I: 0.055 (H)  0951 Troponin POCT: 0.04    Asymptomatic COVID-19 Virus by PCR: In process     Interventions:  1008 Heparin 4800 units loading " dose IV  1009 Heparin 2500 units in 0.45% NaCl 250 mL IV  Aspirin ordered.     Emergency Department Course:  0825 Nursing notes and vitals reviewed. I performed an exam of the patient as documented above.     EKG was done, interpretation as above.    IV inserted. Medicine administered as documented above. Blood drawn. This was sent to the lab for further testing, results above.    0850 I rechecked the patient and discussed the results of his workup thus far.     1000:   I rechecked the patient and discussed the results of his workup. We discussed the plan for admission for further evaluation.     I personally reviewed the laboratory results with the Patient and answered all related questions prior to admission.     1015  I consulted with Dr. Steen of the hospitalist services. They are in agreement to accept the patient for admission to a cardiology bed for further monitoring, evaluation, and treatment.     1024 I consulted with Dr. Gonzalez, cardiology, regarding the patient's history and presentation here in the emergency department.   Impression & Plan    Covid-19  Odessa Elliott was evaluated during a global COVID-19 pandemic, which necessitated consideration that the patient might be at risk for infection with the SARS-CoV-2 virus that causes COVID-19.   Applicable protocols for evaluation were followed during the patient's care.   COVID-19 was considered as part of the patient's evaluation. The plan for testing is:  a test was obtained during this visit.     Medical Decision Making:  Odessa Elliott is a 65 year old male with a history of hyperlipidemia and CAD s/p 5 vessel  CABG who presents for the evaluation of worsening intermittent, exertional, mid sternal chest pressure over the last several weeks. He notes that his symptoms have increased in severity over the last 1-2 days noting chest discomfort with minimal exertion suggesting unstable angina with concern for acute coronary syndrome. EKG was obtained as noted  above. Troponin elevated at 0.055. While in the ED, the patient remained asymptomatic. He was given aspirin in the ED and was started on heparin. Case was discussed with Dr. Steen (hospitalist) and Dr. Gonzalez (Cardiologist). The patient will be admitted for cardiology evaluation and likely angiogram. Asymptomatic Covid test pending. All questions and concerns were addressed prior to admission.     Diagnosis:    ICD-10-CM    1. Acute coronary syndrome (H)  I24.9 Asymptomatic COVID-19 Virus (Coronavirus) by PCR   2. Unstable angina (H)  I20.0        Disposition:  Admitted to Dr. Celsa Rolle  11/24/2020   [facility] EMERGENCY DEPT    Scribe Disclosure:  I, Abby Rolle, am serving as a scribe at 8:25 AM on 11/24/2020 to document services personally performed by Buffy Paris PA-C based on my observations and the provider's statements to me.         Buffy Paris PA-C  11/24/20 1103       Yobany Choe MD  11/24/20 1411

## 2020-11-25 ENCOUNTER — APPOINTMENT (OUTPATIENT)
Dept: PHYSICAL THERAPY | Facility: CLINIC | Age: 65
DRG: 282 | End: 2020-11-25
Attending: INTERNAL MEDICINE
Payer: COMMERCIAL

## 2020-11-25 VITALS
WEIGHT: 170.3 LBS | DIASTOLIC BLOOD PRESSURE: 72 MMHG | HEIGHT: 71 IN | RESPIRATION RATE: 18 BRPM | BODY MASS INDEX: 23.84 KG/M2 | HEART RATE: 64 BPM | TEMPERATURE: 98.1 F | SYSTOLIC BLOOD PRESSURE: 116 MMHG | OXYGEN SATURATION: 98 %

## 2020-11-25 LAB
ALBUMIN SERPL-MCNC: 3.6 G/DL (ref 3.4–5)
ALP SERPL-CCNC: 91 U/L (ref 40–150)
ALT SERPL W P-5'-P-CCNC: 41 U/L (ref 0–70)
ANION GAP SERPL CALCULATED.3IONS-SCNC: 1 MMOL/L (ref 3–14)
AST SERPL W P-5'-P-CCNC: 37 U/L (ref 0–45)
BILIRUB DIRECT SERPL-MCNC: 0.2 MG/DL (ref 0–0.2)
BILIRUB SERPL-MCNC: 0.6 MG/DL (ref 0.2–1.3)
BUN SERPL-MCNC: 12 MG/DL (ref 7–30)
CALCIUM SERPL-MCNC: 8.7 MG/DL (ref 8.5–10.1)
CHLORIDE SERPL-SCNC: 108 MMOL/L (ref 94–109)
CHOLEST SERPL-MCNC: 141 MG/DL
CO2 SERPL-SCNC: 28 MMOL/L (ref 20–32)
CREAT SERPL-MCNC: 0.81 MG/DL (ref 0.66–1.25)
ERYTHROCYTE [DISTWIDTH] IN BLOOD BY AUTOMATED COUNT: 13.5 % (ref 10–15)
GFR SERPL CREATININE-BSD FRML MDRD: >90 ML/MIN/{1.73_M2}
GLUCOSE SERPL-MCNC: 85 MG/DL (ref 70–99)
HCT VFR BLD AUTO: 44.2 % (ref 40–53)
HDLC SERPL-MCNC: 66 MG/DL
HGB BLD-MCNC: 15.1 G/DL (ref 13.3–17.7)
INTERPRETATION ECG - MUSE: NORMAL
LDLC SERPL CALC-MCNC: 62 MG/DL
MCH RBC QN AUTO: 31.5 PG (ref 26.5–33)
MCHC RBC AUTO-ENTMCNC: 34.2 G/DL (ref 31.5–36.5)
MCV RBC AUTO: 92 FL (ref 78–100)
NONHDLC SERPL-MCNC: 75 MG/DL
PLATELET # BLD AUTO: 183 10E9/L (ref 150–450)
POTASSIUM SERPL-SCNC: 4.5 MMOL/L (ref 3.4–5.3)
PROT SERPL-MCNC: 6.7 G/DL (ref 6.8–8.8)
RBC # BLD AUTO: 4.8 10E12/L (ref 4.4–5.9)
SODIUM SERPL-SCNC: 137 MMOL/L (ref 133–144)
TRIGL SERPL-MCNC: 65 MG/DL
WBC # BLD AUTO: 3.9 10E9/L (ref 4–11)

## 2020-11-25 PROCEDURE — 97530 THERAPEUTIC ACTIVITIES: CPT | Mod: GP

## 2020-11-25 PROCEDURE — 99238 HOSP IP/OBS DSCHRG MGMT 30/<: CPT | Performed by: INTERNAL MEDICINE

## 2020-11-25 PROCEDURE — 97110 THERAPEUTIC EXERCISES: CPT | Mod: GP

## 2020-11-25 PROCEDURE — 99232 SBSQ HOSP IP/OBS MODERATE 35: CPT | Performed by: INTERNAL MEDICINE

## 2020-11-25 PROCEDURE — 80076 HEPATIC FUNCTION PANEL: CPT | Performed by: INTERNAL MEDICINE

## 2020-11-25 PROCEDURE — 80061 LIPID PANEL: CPT | Performed by: INTERNAL MEDICINE

## 2020-11-25 PROCEDURE — 36415 COLL VENOUS BLD VENIPUNCTURE: CPT | Performed by: INTERNAL MEDICINE

## 2020-11-25 PROCEDURE — 85027 COMPLETE CBC AUTOMATED: CPT | Performed by: INTERNAL MEDICINE

## 2020-11-25 PROCEDURE — 250N000013 HC RX MED GY IP 250 OP 250 PS 637: Performed by: INTERNAL MEDICINE

## 2020-11-25 PROCEDURE — 80048 BASIC METABOLIC PNL TOTAL CA: CPT | Performed by: INTERNAL MEDICINE

## 2020-11-25 PROCEDURE — 97161 PT EVAL LOW COMPLEX 20 MIN: CPT | Mod: GP

## 2020-11-25 RX ORDER — NITROGLYCERIN 0.4 MG/1
TABLET SUBLINGUAL
Qty: 30 TABLET | Refills: 0 | Status: SHIPPED | OUTPATIENT
Start: 2020-11-25 | End: 2024-02-09

## 2020-11-25 RX ORDER — LISINOPRIL 2.5 MG/1
2.5 TABLET ORAL DAILY
Qty: 60 TABLET | Refills: 0 | Status: SHIPPED | OUTPATIENT
Start: 2020-11-26 | End: 2020-11-30

## 2020-11-25 RX ORDER — ISOSORBIDE MONONITRATE 30 MG/1
30 TABLET, EXTENDED RELEASE ORAL DAILY
Status: DISCONTINUED | OUTPATIENT
Start: 2020-11-25 | End: 2020-11-25 | Stop reason: HOSPADM

## 2020-11-25 RX ORDER — LISINOPRIL 2.5 MG/1
2.5 TABLET ORAL DAILY
Status: DISCONTINUED | OUTPATIENT
Start: 2020-11-25 | End: 2020-11-25 | Stop reason: HOSPADM

## 2020-11-25 RX ORDER — ISOSORBIDE MONONITRATE 30 MG/1
30 TABLET, EXTENDED RELEASE ORAL DAILY
Qty: 90 TABLET | Refills: 0 | Status: SHIPPED | OUTPATIENT
Start: 2020-11-26 | End: 2020-11-30

## 2020-11-25 RX ADMIN — LISINOPRIL 2.5 MG: 2.5 TABLET ORAL at 10:22

## 2020-11-25 RX ADMIN — ATORVASTATIN CALCIUM 80 MG: 80 TABLET, FILM COATED ORAL at 08:06

## 2020-11-25 RX ADMIN — METOPROLOL SUCCINATE 12.5 MG: 25 TABLET, EXTENDED RELEASE ORAL at 08:06

## 2020-11-25 RX ADMIN — ALLOPURINOL 100 MG: 100 TABLET ORAL at 08:06

## 2020-11-25 RX ADMIN — ISOSORBIDE MONONITRATE 30 MG: 30 TABLET, EXTENDED RELEASE ORAL at 10:22

## 2020-11-25 RX ADMIN — ASPIRIN 81 MG: 81 TABLET, CHEWABLE ORAL at 08:06

## 2020-11-25 ASSESSMENT — ACTIVITIES OF DAILY LIVING (ADL)
ADLS_ACUITY_SCORE: 10

## 2020-11-25 ASSESSMENT — MIFFLIN-ST. JEOR: SCORE: 1574.61

## 2020-11-25 NOTE — PLAN OF CARE
Vitals:  systolic this AM with activity. Started on lisinopril and Imdur.   Lungs: WNL  CV: WNL, NSR. No CP  GI: WNL  Uro: WNL  CMS: WNL. Left radial site dry and intact.   Neuro: WNL  Skin: WNL  Psych: WNL  MSK: WNL, Indp   Pain: Denies  Labs: WNL  Tests/Procedures: na  Other:  Pt. Cleared for discharge home. Education/Review of AVS including: medications (given handouts), follow up plan, care and restrictions, and when to seek medical attention. Ziopatch applied before discharge.

## 2020-11-25 NOTE — PLAN OF CARE
A&O x 4. VSS on RA. Pt denies pain. Tele:SR/SB.  Up independently. Aggression color: green. Post TR band left wrist- CMS intact. Plan for possible discharge later today. Continue to monitor.         Vital signs:  Temp: 98.1  F (36.7  C) Temp src: Oral BP: (!) 129/90 Pulse: 60   Resp: 16 SpO2: 100 % O2 Device: None (Room air)

## 2020-11-25 NOTE — DISCHARGE SUMMARY
[facility]    Discharge Summary  Hospitalist    Date of Admission:  11/24/2020  Date of Discharge:  11/25/2020  Discharging Provider: Henrry Steen MD    Discharge Diagnoses   NSTEMI  Suspect angina,   Coronary angiogram demonstrated patent grafts with no obvious targets for intervention.     History of Present Illness     Odessa Elliott is a 65 year old male with a history of coronary disease status post 5 vessel CABG, dyslipidemia, history of gout, history of GI bleed who presents with progressive exertional chest pain.     Patient states that he began developing substernal burning-like chest pain with exercise approximately 7 weeks ago.  He would notice this when going up hills on his bike where he would require slightly more exertion than usual.  This would resolve with going on level ground or stopping exercise.  No radiation to the abdomen neck jaw or arm.  No shortness of breath.  Was relatively stable than yesterday while biking on flat ground he noticed the same substernal burning-like pain.  He also noted it with walking up a hill with his wife and while working in the yard scooping up leaves into a tarp.  In the emergency room he is chest pain-free.  He has been chest pain-free at rest.  He does not have any nitroglycerin at home.     He does not have the symptoms at rest or with eating.  No dysphagia odynophagia.  No fevers chills cough myalgias ENT symptoms.  No bleeding.  No history of peptic ulcer disease or GI bleed.  No hematuria.  Palpitations or falls or dizziness.     [facility] for further evaluation.  Patient slightly hypertensive initially but now normotensive.  Normal oxygen saturations.  Afebrile.  Pulse in the 50s to 60s.  Labs notable for initial troponin 0.055.  Glucose 66.  BMP normal.  CBC normal.  Normal white blood cell count and hemoglobin and platelet count.  Normal differential.  EKG shows normal sinus rhythm not acute ischemic  changes.  No ST-T wave changes.  In the emergency room patient was initiated on aspirin and heparin drip.  concern for unstable angina.  Patient has been chest pain free in the emergency room.     EDMD talk to cardiology and plan is for likely angiogram this afternoon once Covid test confirmed negative.     Hospital Course   Odessa Elliott was admitted on 11/24/2020.  The following problems were addressed during his hospitalization:    Principal Problem:    NSTEMI (non-ST elevated myocardial infarction) (H)  Active Problems:    Hyperlipidemia    Coronary artery disease    S/P CABG x 5    Unstable angina (H)    Acute coronary syndrome (H)    Gout  NSTEMI    Acute coronary syndrome (H)   Coronary artery disease   S/P CABG x 5  Dyslipidemia  Chest pain    Assessment:   -Patient presents with approximately 7 weeks of new exertional substernal burning chest discomfort which regressed over the last 1 to 2 days.  Patient now has similar symptoms with much less exertion.  -Normal exam.  Normal vital signs.  Afebrile.  -Initial EKG without acute ischemic changes.  CBC normal.  BMP normal.  Initial troponin 0.055  -Chest pain-free in the emergency room.  -NSTEMI  -peak troponin 0.055 then down trending to nl range  -ddimer negative.   - seen by cardiology, taken for urgent angiogram after determined covid negative.   - Coronary angio yesterday demonstrated patent grafts with no obvious targets for intervention.   -Echo: The visual ejection fraction is estimated at 60-65%.  The right ventricular systolic function is borderline reduced. RV size is  normal.  Aorta appears dilated 4.4 cms at the sinuses and 4 cms at the level of the  visualized proximal aorta.  No hemodynamically significant valve disease.  No pericardial effusion.  No significant change compared to prior study.    -cardiology added low dose imdur and lisinopril to optimize cardiac regemin and treat possible angina  - pt did well remainder of hospital stay. Did  well with cardiac rehab.   - per cardiology discharge with 7 day Zio patch  - cardiology ADRIANO visit 2 weeks  - pcp visit 1 week with BMP since started on ace inh.      -case discussed with pt, cardiology and RN day of discharge.   - refills of meds with pcp  -provided prn ntg SL tabs        Gout    Assessment: Allopurinol and as needed colchicine.  No recent gout attacks.  No signs of gout.    Plan: cont allopurinol.  Patient has PCP follow-up next Monday.     Asymptomatic Covid testing.  Covid PCR swab negative. Low suspicion   Code Status: Full code, discussed on admission     Disposition: discharge home.     Henrry Steen MD, MD    Significant Results and Procedures   See hospital course    Pending Results     Unresulted Labs Ordered in the Past 30 Days of this Admission     No orders found for last 31 day(s).          Code Status   Full Code       Primary Care Physician   Mikie Lazaro MD    Physical Exam   Temp: 98.1  F (36.7  C) Temp src: Oral BP: 116/72 Pulse: 64   Resp: 18 SpO2: 98 % O2 Device: None (Room air) Oxygen Delivery: 2 LPM  Vitals:    11/24/20 0816 11/24/20 1201 11/25/20 0500   Weight: 80.3 kg (177 lb) 78.3 kg (172 lb 9.6 oz) 77.2 kg (170 lb 4.8 oz)     Vital Signs with Ranges  Temp:  [97.8  F (36.6  C)-98.1  F (36.7  C)] 98.1  F (36.7  C)  Pulse:  [48-64] 64  Resp:  [14-18] 18  BP: (115-150)/(71-92) 116/72  SpO2:  [97 %-100 %] 98 %  No intake/output data recorded.    Constitutional: nad  Respiratory: Clear to auscultation bilaterally  Cardiovascular: Regular rate and rhythm no rubs gallops or murmurs appreciated, radial artery angiogram site looks fine  GI: Soft nontender nondistended  Skin: No edema.  No rash.  No cyanosis  Neurologic: nl speech and mentation  Psychiatric: nl affect    Discharge Disposition   Discharged to home  Condition at discharge: Good    Consultations This Hospital Stay   PHARMACY IP CONSULT  PHARMACY IP CONSULT  CARDIOLOGY IP CONSULT  PHARMACY IP  CONSULT  PHARMACY IP CONSULT  CARDIAC REHAB IP CONSULT  PHARMACY IP CONSULT  PHARMACY IP CONSULT  CARE MANAGEMENT / SOCIAL WORK IP CONSULT  SMOKING CESSATION PROGRAM IP CONSULT  SMOKING CESSATION PROGRAM IP CONSULT    Time Spent on this Encounter   I, Henrry Steen MD, personally saw the patient today and spent less than or equal to 30 minutes discharging this patient.    Discharge Orders      Discharge Order: F/U with Cardiac  ADRIANO      Reason for your hospital stay    NSTEMI, dyspnea on exertion.   Nl coronary angiogram.     Follow-up and recommended labs and tests     1. See [facility] in 2 weeks  2. See primary care doctor one week - get BMP lab test with that visit since you were started on Lisinopril. Get refills of medications that visit.   3. Zio patch 7 days set up on day of discharge.     Activity    Your activity upon discharge: activity as tolerated     Discharge Instructions    1. Start Imdur and Lisinopril to optimize heart function  2. 7 days Zio patch (heart monitor)     Full Code     Leadless EKG Monitor 3 to 14 Days     Zio Patch Holter Adult Pediatric Greater than 48 hrs     Diet    Follow this diet upon discharge: Orders Placed This Encounter      Regular Diet Adult     Discharge Medications   Current Discharge Medication List      START taking these medications    Details   isosorbide mononitrate (IMDUR) 30 MG 24 hr tablet Take 1 tablet (30 mg) by mouth daily  Qty: 90 tablet, Refills: 0    Comments: Future refills by PCP Dr. Mikie Lazaro MD with phone number [phone].  Associated Diagnoses: Coronary artery disease involving native coronary artery of native heart without angina pectoris      lisinopril (ZESTRIL) 2.5 MG tablet Take 1 tablet (2.5 mg) by mouth daily  Qty: 60 tablet, Refills: 0    Comments: Future refills by PCP Dr. Mikie Lazaro MD with phone number [phone].  Associated Diagnoses: Coronary artery disease involving native coronary artery of  native heart without angina pectoris         CONTINUE these medications which have NOT CHANGED    Details   allopurinol (ZYLOPRIM) 100 MG tablet TAKE 1 TABLET DAILY  Qty: 90 tablet, Refills: 1    Associated Diagnoses: Acute gouty arthritis      aspirin 81 MG tablet Take 81 mg by mouth daily      atorvastatin (LIPITOR) 80 MG tablet Take 1 tablet (80 mg) by mouth daily  Qty: 90 tablet, Refills: 3    Associated Diagnoses: Coronary artery disease involving native coronary artery of native heart without angina pectoris      Calcium Carbonate (CALCIUM 600 PO) Take 1 tablet by mouth daily      colchicine (COLCRYS) 0.6 MG tablet Take 2 tabs (1.2mg) by mouth at the first sign of a gout attack, then 1 tab (0.6mg) 1 hour later.  Max 3 tabs (1.8mg) over 1 hour.  Qty: 30 tablet, Refills: 3    Associated Diagnoses: Acute gouty arthritis      metoprolol succinate ER (TOPROL-XL) 25 MG 24 hr tablet Take 0.5 tablets (12.5 mg) by mouth daily  Qty: 45 tablet, Refills: 3    Associated Diagnoses: Coronary artery disease involving native coronary artery of native heart without angina pectoris      Multiple Vitamin (MULTI VITAMIN DAILY PO) Take 1 tablet by mouth daily       NONFORMULARY Take by mouth daily Collagen 1 scoop      Omega-3 Fatty Acids (FISH OIL PO) Take 1 capsule by mouth daily       Vitamin D, Cholecalciferol, 1000 UNITS TABS Take 1,000 Units by mouth daily            Allergies   Allergies   Allergen Reactions     No Known Allergies      Data   Most Recent 3 CBC's:  Recent Labs   Lab Test 11/25/20  0636 11/24/20  1233 11/24/20  0850   WBC 3.9* 3.9* 4.4   HGB 15.1 15.4 15.5   MCV 92 92 92    203 195      Most Recent 3 BMP's:  Recent Labs   Lab Test 11/25/20  0636 11/24/20  1233 11/24/20  0850 10/29/19  0827     --  138 140   POTASSIUM 4.5 4.2 4.2 4.6   CHLORIDE 108  --  106 107   CO2 28  --  29 28   BUN 12  --  12 11   CR 0.81  --  0.82 0.85   ANIONGAP 1*  --  3 5   MARU 8.7  --  9.0 8.8   GLC 85  --  66* 77      Most Recent 2 LFT's:  Recent Labs   Lab Test 11/25/20  0636 10/29/19  0827   AST 37 26   ALT 41 30   ALKPHOS 91 86   BILITOTAL 0.6 0.8     Most Recent INR's and Anticoagulation Dosing History:  Anticoagulation Dose History     Recent Dosing and Labs Latest Ref Rng & Units 9/19/2013 9/23/2013 9/23/2013 9/24/2013 9/25/2013 9/30/2013    INR 0.86 - 1.14 0.98 1.63(H) 1.23(H) 1.23(H) 1.22(H) 1.06        Most Recent 3 Troponin's:  Recent Labs   Lab Test 11/24/20  1617 11/24/20  1233 11/24/20  0951 11/24/20  0850   TROPI 0.031 0.043  --  0.055*   TROPONIN  --   --  0.04  --      Most Recent Cholesterol Panel:  Recent Labs   Lab Test 11/25/20  0636   CHOL 141   LDL 62   HDL 66   TRIG 65     Most Recent 6 Bacteria Isolates From Any Culture (See EPIC Reports for Culture Details):  Recent Labs   Lab Test 10/02/13  1435 10/02/13  0725   CULT No growth No Salmonella, Shigella, Campylobacter, E. coli O157, Aeromonas, or Plesiomonas  isolated.     Most Recent TSH, T4 and A1c Labs:  Recent Labs   Lab Test 11/24/20  1233   TSH 1.25   A1C 5.0     Results for orders placed or performed during the hospital encounter of 11/24/20   Chest XR,  PA & LAT    Narrative    CHEST TWO VIEWS   11/24/2020 10:56 AM     HISTORY: Intermittent chest pain.    COMPARISON: Chest x-ray on 9/26/2013      Impression    IMPRESSION: Two views of the chest were obtained. Post surgical  changes of cardiac surgery with median sternotomy wires and surgical  clips. Scattered nodular opacities predominantly in the right lung  including in the right mid/upper lobe and right lower lobe,  indeterminate, could represent small calcified granulomas. No  significant pleural effusion or pneumothorax.    LOREE WEATHERS MD   Echocardiogram Complete    Narrative    [id number]  [id number]  [id number]  [id number]^ALI^BILAL^CAITLYN           [facility]  Echocardiography Laboratory  [address] [place] [address]        Name: AVINASH THOMAS  MRN:  [id number]  : 1955  Study Date: 2020 10:50 AM  Age: 65 yrs  Gender: Male  Patient Location: [facility]  Reason For Study: MI  History: CABG  Ordering Physician: XENIA MENJIVAR  Referring Physician: IRVIN RUIZ  Performed By: Marin Muñoz RDCS     BSA: 2.0 m2  Height: 71 in  Weight: 177 lb  HR: 56  BP: 149/93 mmHg  _____________________________________________________________________________  __        Procedure  Complete Portable Echo Adult.  _____________________________________________________________________________  __        Interpretation Summary     The visual ejection fraction is estimated at 60-65%.  The right ventricular systolic function is borderline reduced. RV size is  normal.  Aorta appears dilated 4.4 cms at the sinuses and 4 cms at the level of the  visualized proximal aorta.  No hemodynamically significant valve disease.  No pericardial effusion.     No significant change compared to prior study.  _____________________________________________________________________________  __        Left Ventricle  The left ventricle is normal in size. There is borderline concentric left  ventricular hypertrophy. Proximal septal thickening is noted. Left ventricular  systolic function is normal. The visual ejection fraction is estimated at 60-  65%. Diastolic Doppler findings (E/E' ratio and/or other parameters) suggest  left ventricular filling pressures are normal. No regional wall motion  abnormalities noted.     Right Ventricle  The right ventricle is normal size. The right ventricular systolic function is  borderline reduced.     Atria  The left atrium is mildly dilated. Borderline right atrial enlargement. There  is no color Doppler evidence of an atrial shunt.     Mitral Valve  The mitral valve is normal in structure and function. There is trace mitral  regurgitation. There is no mitral valve stenosis.        Tricuspid Valve  The tricuspid valve is normal in structure and function. There is  trace to  mild tricuspid regurgitation. The right ventricular systolic pressure is  approximated at 23.7 mmHg plus the right atrial pressure.     Aortic Valve  The aortic valve is trileaflet with aortic valve sclerosis. There is trace to  mild aortic regurgitation. No hemodynamically significant valvular aortic  stenosis.     Pulmonic Valve  The pulmonic valve is not well visualized. There is trace to mild pulmonic  valvular regurgitation.     Vessels  Moderate aortic root dilatation. The ascending aorta is Mildly dilated. The  inferior vena cava was normal in size with preserved respiratory variability.     Pericardium  There is no pericardial effusion.     _____________________________________________________________________________  __  MMode/2D Measurements & Calculations  IVSd: 1.2 cm  LVIDd: 4.6 cm  LVIDs: 3.1 cm  LVPWd: 1.1 cm  FS: 31.4 %  LV mass(C)d: 187.0 grams  LV mass(C)dI: 93.4 grams/m2     Ao root diam: 4.4 cm  LA dimension: 3.2 cm  asc Aorta Diam: 4.0 cm  LA/Ao: 0.72  LA Volume (BP): 65.4 ml  LA Volume Index (BP): 32.7 ml/m2  RWT: 0.49        Doppler Measurements & Calculations  MV E max garry: 55.2 cm/sec  MV A max garry: 45.6 cm/sec  MV E/A: 1.2  MV dec time: 0.34 sec     PA V2 max: 89.2 cm/sec  PA max PG: 3.2 mmHg  PA mean P.7 mmHg  PA V2 VTI: 17.2 cm  PA acc time: 0.15 sec  PI end-d garry: 94.6 cm/sec  TR max garry: 243.6 cm/sec  TR max P.7 mmHg  E/E' av.5  Lateral E/e': 6.5  Medial E/e': 8.5           _____________________________________________________________________________  __           Report approved by: Jessica Felix 2020 12:58 PM      Cardiac Catheterization    Narrative    Conclusions:   -Three-vessel coronary artery disease s/p CABG   -Patent LIMA to LAD graft  -Patent SVG to OM1 graft  -Patent SVG to OM 2 graft  -Patent SVG to right PL graft  -Patent SVG to right PDA graft  -Residual proximal LAD to diagonal 1 branch stenosis that could be   amenable to PCI if  necessary to control symptoms.  -Residual mid LAD and ostial D2 disease, which is backfilled with LIMA   graft.  This would not be appropriate to attempt PCI.  - of proximal LAD,  of proximal LCx, and  of proximal RCA    Plans:   -Usual post procedure care with TR band    -Medical management including low-dose aspirin, high-intensity statin, and   maximal anti-anginal therapy  -If medical therapy fails to control angina, we will consider PCI to left   main/proximal LAD/diagonal branch, with atherectomy        Angiography result  -There is a very short left main.  Proximal LAD has severe disease and the   following .  Diagonal 1 branch is filled from the native LCA and it is   with severe stenosis with calcification.    -LCx has a  proximally.  -RCA is  at proximally.    -LIMA to LAD graft is widely patent.  It backfills proximal LAD and   diagonal 2 branch.  Proximal to the anastomosis of the LIMA graft there   are several severe stenosis and also has ostial severe stenosis.  -SVG to right PDA graft is patent.  Right PDA is diffusely diseased and   small vessel size.  It backfills distal RCA and several RV marginal branches.    -SVG to right PL graft is diffusely diseased however it supplies very   small area of the lateral wall and it is with TAYLER-3 flow.  -SVG to OM1 graft is patent.  There is mild to moderate ostial graft   stenosis.  -SVG to OM 2 graft is widely patent.

## 2020-11-25 NOTE — PROGRESS NOTES
"Cardiology Progress Note          Assessment and Plan:         65-year-old gentleman with past medical history significant for coronary artery disease status post coronary artery bypass grafting x5 in 2013 (LIMA-LAD, SVG-PDA, SVG-PL, SVG-OM1, SVG- OM2), admitted with exertional chest discomfort over the past several weeks. Cardiac troponins were elevated consistent with a non-ST elevation myocardial infarction.        1. NSTEMI. Coronary angio yesterday demonstrated patent grafts with no obvious targets for intervention.   2. CAD s/p CABG  as described above  3. Dyslipidemia        PLAN  - Coronary angio and echocardiogram reviewed. D-dimer negative  - Would recommend medical optimization with addition of Imdur and low dose lisinopril  - continue metoprolol, ASA, atorvastatin  - OK to discharge later today if no recurrent symptoms with activity  - Would recommend discharge with 7 day Zio patch monitor and ADRIANO follow up in 2 weeks.         Interval History:     Coronary angio findings reviewed with Dr Collier and patient. This morning he feels well and denies any CP or SOB.              Review of Systems:   As per subjective, otherwise 5 systems reviewed and negative.           Physical Exam:   Blood pressure (!) 129/90, pulse 60, temperature 98.1  F (36.7  C), temperature source Oral, resp. rate 16, height 1.803 m (5' 11\"), weight 77.2 kg (170 lb 4.8 oz), SpO2 100 %.      Vital Sign Ranges  Temperature Temp  Av.9  F (36.6  C)  Min: 97.7  F (36.5  C)  Max: 98.1  F (36.7  C)   Blood pressure Systolic (24hrs), Av , Min:113 , Max:149        Diastolic (24hrs), Av, Min:71, Max:115      Pulse Pulse  Av.9  Min: 48  Max: 63   Respirations Resp  Avg: 15  Min: 8  Max: 28   Pulse oximetry SpO2  Av.9 %  Min: 97 %  Max: 100 %       No intake or output data in the 24 hours ending 20 0803    Constitutional: NAD  Skin: Warm and dry  Neck: No JVD  Lungs: CTA  Cardiovascular: RRR, no " m/r/g  Abdomen: Soft, non tender.  Extremities and Back: No LE edema. No hematoma at cath site.   Neurological: Nonfocal           Medications:     I have reviewed this patient's current medications.              Data:     Labs reviewed.         Abdirahman Gonzalez MD, M.D.

## 2020-11-25 NOTE — PROGRESS NOTES
11/25/20 1157   Quick Adds   Type of Visit Initial PT Evaluation   Living Environment   People in home spouse   Current Living Arrangements house   Transportation Anticipated car, drives self   Self-Care   Usual Activity Tolerance good   Current Activity Tolerance good   Regular Exercise Yes   Activity/Exercise Type biking;walking   Exercise Amount/Frequency 3-5 times/wk;45 mins   Equipment Currently Used at Home none   Activity/Exercise/Self-Care Comment Pt is independent and active at baseline, walks daily and bike rides as well.   Disability/Function   Fall history within last six months no   General Information   Onset of Illness/Injury or Date of Surgery 11/24/20   Referring Physician Henrry Steen MD   Patient/Family Therapy Goals Statement (PT) To go home   Pertinent History of Current Problem (include personal factors and/or comorbidities that impact the POC) Pt is 65 year old male adm on 11/24/2020 with chest pain that had been progressively worsening since October. Pt had angio done on 11/24/2020 with no intervention. PMH includes incr chol, CAD with CABG x5 previously.   Cognition   Orientation Status (Cognition) oriented x 4   Affect/Mental Status (Cognition) WNL   Follows Commands (Cognition) WNL   Pain Assessment   Patient Currently in Pain No   Posture    Posture Not impaired   Range of Motion (ROM)   ROM Comment B LE ROM WFL   Strength   Strength Comments B LE strength WFL   Bed Mobility   Comment (Bed Mobility) Independent   Transfers   Transfer Safety Comments Independent   Gait/Stairs (Locomotion)   Comment (Gait/Stairs) Independent   Balance   Balance Comments No LOB with functional tasks   Clinical Impression   Criteria for Skilled Therapeutic Intervention yes, treatment indicated   PT Diagnosis (PT) Impaired activity tolerance   Influenced by the following impairments Decreased endurance   Functional limitations due to impairments Decreased ability to participate in daily tasks    Clinical Presentation Stable/Uncomplicated   Clinical Presentation Rationale Current presentation, [facility]   Clinical Decision Making (Complexity) low complexity   Therapy Frequency (PT) One time eval and treatment only   Predicted Duration of Therapy Intervention (days/wks) 1 day   Planned Therapy Interventions (PT) home exercise program;patient/family education   Risk & Benefits of therapy have been explained evaluation/treatment results reviewed;care plan/treatment goals reviewed;risks/benefits reviewed;current/potential barriers reviewed;participants voiced agreement with care plan;participants included;patient   PT Discharge Planning    PT Discharge Recommendation (DC Rec) home   PT Rationale for DC Rec Pt appears to be near baseline level of function, able to tolerate activity without symptoms, educated on safely increasing activity at home, no further PT needs.   Total Evaluation Time   Total Evaluation Time (Minutes) 10

## 2020-11-25 NOTE — PLAN OF CARE
Cardiac Rehab Discharge Summary    Reason for therapy discharge:    Discharged to home.    Progress towards therapy goal(s). See goals on Care Plan in [facility] electronic health record for goal details.  Goals met    Therapy recommendation(s):    No further therapy is recommended.

## 2020-11-25 NOTE — PLAN OF CARE
"Pt A/O. VSS. Up ind. Tele shows sr/sb. Pt denies any CP or SOB. Angio done, clean coronaries and grafts. Med management and discharge tomorrow. Pt has no new complaints.    Blood pressure (!) 138/90, pulse 58, temperature 97.8  F (36.6  C), temperature source Oral, resp. rate 16, height 1.803 m (5' 11\"), weight 78.3 kg (172 lb 9.6 oz), SpO2 97 %.      "

## 2020-11-25 NOTE — DISCHARGE INSTRUCTIONS
Cardiac Angiogram Discharge Instructions - Radial    After you go home:      Have an adult stay with you until tomorrow.    Drink extra fluids for 2 days.    You may resume your normal diet.    No smoking       For 24 hours - due to the sedation you received:    Relax and take it easy.    Do NOT make any important or legal decisions.    Do NOT drive or operate machines at home or at work.    Do NOT drink alcohol.    Care of Wrist Puncture Site:      For the first 24 hrs - check the puncture site every 1-2 hours while awake.    It is normal to have soreness at the puncture site and mild tingling in your hand for up to 3 days.    Remove the bandaid after 24 hours. If there is minor oozing, apply another bandaid and remove it after 12 hours.    You may shower today.  Do NOT take a bath, or use a hot tub or pool for at least 3 days. Do NOT scrub the site. Do not use lotion or powder near the puncture site.           Activity:        For 2 days:     do not use your hand or arm to support your weight (such as rising from a chair)     do not bend your wrist (such as lifting a garage door).    do not lift more than 5 pounds or exercise your arm (such as tennis, golf or bowling).    Do NOT do any heavy activity such as exercise, lifting, or straining.     Bleeding:      If you start bleeding from the site in your wrist, sit down and press firmly on/above the site for 10 minutes.     Once bleeding stops, keep arm still for 2 hours.     Call [facility] as soon as you can.       Call 911 right away if you have heavy bleeding or bleeding that does not stop.    Call the clinic if:      You have a large or growing hard lump around the site.    The site is red, swollen, hot or tender.    Blood or fluid is draining from the site.    You have chills or a fever greater than 101 F (38 C).    Your arm feels numb, cool or changes color.    You have hives, a rash or unusual itching.    Any questions or concerns.          [facility]  [facility] [place]:  [phone] UM (24/7 days a week)

## 2020-11-27 ENCOUNTER — TELEPHONE (OUTPATIENT)
Dept: FAMILY MEDICINE | Facility: CLINIC | Age: 65
End: 2020-11-27

## 2020-11-27 NOTE — TELEPHONE ENCOUNTER
Acute Coronary Syndrome (H), Coronary Artery Disease Involving Native Coronary Artery Of Native Heart Without Angin,WED 25-NOV-2020,ed/ip  0 / 1    [phone] (home) [phone] (work)

## 2020-11-29 ASSESSMENT — ACTIVITIES OF DAILY LIVING (ADL): CURRENT_FUNCTION: NO ASSISTANCE NEEDED

## 2020-11-30 ENCOUNTER — OFFICE VISIT (OUTPATIENT)
Dept: FAMILY MEDICINE | Facility: CLINIC | Age: 65
End: 2020-11-30
Payer: COMMERCIAL

## 2020-11-30 ENCOUNTER — TELEPHONE (OUTPATIENT)
Dept: CARDIOLOGY | Facility: CLINIC | Age: 65
End: 2020-11-30

## 2020-11-30 VITALS
HEART RATE: 66 BPM | TEMPERATURE: 97.7 F | SYSTOLIC BLOOD PRESSURE: 135 MMHG | HEIGHT: 71 IN | OXYGEN SATURATION: 99 % | DIASTOLIC BLOOD PRESSURE: 86 MMHG | WEIGHT: 175.7 LBS | BODY MASS INDEX: 24.6 KG/M2

## 2020-11-30 DIAGNOSIS — Z00.00 ROUTINE GENERAL MEDICAL EXAMINATION AT A HEALTH CARE FACILITY: Primary | ICD-10-CM

## 2020-11-30 DIAGNOSIS — Z95.1 S/P CABG X 5: ICD-10-CM

## 2020-11-30 DIAGNOSIS — Z23 NEED FOR PROPHYLACTIC VACCINATION AND INOCULATION AGAINST INFLUENZA: ICD-10-CM

## 2020-11-30 DIAGNOSIS — M10.9 ACUTE GOUTY ARTHRITIS: ICD-10-CM

## 2020-11-30 DIAGNOSIS — I25.10 CORONARY ARTERY DISEASE INVOLVING NATIVE CORONARY ARTERY OF NATIVE HEART WITHOUT ANGINA PECTORIS: ICD-10-CM

## 2020-11-30 DIAGNOSIS — Z12.5 ENCOUNTER FOR SCREENING FOR MALIGNANT NEOPLASM OF PROSTATE: ICD-10-CM

## 2020-11-30 PROCEDURE — 80048 BASIC METABOLIC PNL TOTAL CA: CPT | Performed by: INTERNAL MEDICINE

## 2020-11-30 PROCEDURE — G0008 ADMIN INFLUENZA VIRUS VAC: HCPCS | Performed by: INTERNAL MEDICINE

## 2020-11-30 PROCEDURE — 90662 IIV NO PRSV INCREASED AG IM: CPT | Performed by: INTERNAL MEDICINE

## 2020-11-30 PROCEDURE — 90732 PPSV23 VACC 2 YRS+ SUBQ/IM: CPT | Performed by: INTERNAL MEDICINE

## 2020-11-30 PROCEDURE — 84550 ASSAY OF BLOOD/URIC ACID: CPT | Performed by: INTERNAL MEDICINE

## 2020-11-30 PROCEDURE — 36415 COLL VENOUS BLD VENIPUNCTURE: CPT | Performed by: INTERNAL MEDICINE

## 2020-11-30 PROCEDURE — G0402 INITIAL PREVENTIVE EXAM: HCPCS | Performed by: INTERNAL MEDICINE

## 2020-11-30 PROCEDURE — G0009 ADMIN PNEUMOCOCCAL VACCINE: HCPCS | Performed by: INTERNAL MEDICINE

## 2020-11-30 RX ORDER — LISINOPRIL 2.5 MG/1
2.5 TABLET ORAL DAILY
Qty: 90 TABLET | Refills: 3 | Status: SHIPPED | OUTPATIENT
Start: 2020-11-30 | End: 2021-12-06

## 2020-11-30 RX ORDER — ISOSORBIDE MONONITRATE 30 MG/1
30 TABLET, EXTENDED RELEASE ORAL DAILY
Qty: 90 TABLET | Refills: 3 | Status: SHIPPED | OUTPATIENT
Start: 2020-11-30 | End: 2020-12-17

## 2020-11-30 RX ORDER — ALLOPURINOL 100 MG/1
100 TABLET ORAL DAILY
Qty: 90 TABLET | Refills: 3 | Status: SHIPPED | OUTPATIENT
Start: 2020-11-30 | End: 2021-11-17

## 2020-11-30 RX ORDER — ATORVASTATIN CALCIUM 80 MG/1
80 TABLET, FILM COATED ORAL DAILY
Qty: 90 TABLET | Refills: 3 | Status: SHIPPED | OUTPATIENT
Start: 2020-11-30 | End: 2021-08-05

## 2020-11-30 ASSESSMENT — ACTIVITIES OF DAILY LIVING (ADL): CURRENT_FUNCTION: NO ASSISTANCE NEEDED

## 2020-11-30 ASSESSMENT — MIFFLIN-ST. JEOR: SCORE: 1599.1

## 2020-11-30 NOTE — TELEPHONE ENCOUNTER
Patient was evaluated by cardiology while inpatient for increased chest pain with exertion with mildly elevated troponin-NSTEMI. PMH: CAD with CABG 2013, HLD, GIB, Gout. 11/24/20: Coronary angiogram via LRA showed all 5 grafts to be patent, with residual proximal LAD to diagonal 1 branch stenosis that could be amenable to PCI if necessary to control symptoms. Residual mid LAD and ostial D2 disease, which is backfilled with LIMA graft.This would not be appropriate to attempt PCI. Medical management. Pt was started on Imdur and Lisinopril at time of discharge. Discharged wearing a 7 day Zio Patch monitor. RN will confirm with patient that she has an OV scheduled on 12/17/20 at 1530 with ADRIANO Vanessa Lan at our [facility]. VENANCIO Rodriguez RN.

## 2020-11-30 NOTE — PATIENT INSTRUCTIONS
(Z00.00) Routine general medical examination at a health care facility  (primary encounter diagnosis)  Comment: .For routine exam, we reviewed  labs as ordered, cholesterol, diabetes mellitus check, liver function, renal function  We will also update vaccination history  Plan:     (Z23) Need for prophylactic vaccination and inoculation against influenza  Comment:   Plan: FLUZONE HIGH DOSE 65+  [32643]            (I25.10) Coronary artery disease involving native coronary artery of native heart without angina pectoris  Comment: Recheck basic metabolic panel and continue statin, beta blocker, ace inhibitor, imdur and aspirin   Plan: lisinopril (ZESTRIL) 2.5 MG tablet, isosorbide         mononitrate (IMDUR) 30 MG 24 hr tablet,         atorvastatin (LIPITOR) 80 MG tablet, **Basic         metabolic panel FUTURE anytime            (Z95.1) S/P CABG x 5  Comment: as above   Plan:     (M10.9) Acute gouty arthritis  Comment: continue allopurinol  Plan: allopurinol (ZYLOPRIM) 100 MG tablet, Uric acid

## 2020-12-01 LAB
ANION GAP SERPL CALCULATED.3IONS-SCNC: 3 MMOL/L (ref 3–14)
BUN SERPL-MCNC: 14 MG/DL (ref 7–30)
CALCIUM SERPL-MCNC: 9.2 MG/DL (ref 8.5–10.1)
CHLORIDE SERPL-SCNC: 108 MMOL/L (ref 94–109)
CO2 SERPL-SCNC: 28 MMOL/L (ref 20–32)
CREAT SERPL-MCNC: 0.86 MG/DL (ref 0.66–1.25)
GFR SERPL CREATININE-BSD FRML MDRD: 90 ML/MIN/{1.73_M2}
GLUCOSE SERPL-MCNC: 73 MG/DL (ref 70–99)
POTASSIUM SERPL-SCNC: 4.4 MMOL/L (ref 3.4–5.3)
SODIUM SERPL-SCNC: 139 MMOL/L (ref 133–144)
URATE SERPL-MCNC: 4.7 MG/DL (ref 3.5–7.2)

## 2020-12-01 NOTE — TELEPHONE ENCOUNTER
"ED / Discharge Outreach Protocol    Patient Contact    Attempt # 1    Was call answered?  No.  Left message on voicemail with information to call me back.      Discharge Instructions    \"Let's review your discharge instructions.  What is/are the follow-up recommendations?  Pt. Response:     1. See [facility] in 2 weeks  2. See primary care doctor one week - get BMP lab test with that visit since you were started on Lisinopril. Get refills of medications that visit.   3. Zio patch 7 days set up on day of discharge.       Medications    \"Tell me what changed about your medicines when you discharged?\"    Start imdur and lisinopril     Left VM to call back    Charles MEZA RN    "

## 2020-12-01 NOTE — TELEPHONE ENCOUNTER
Writer attempted to call pt, but no answer. VM left instructing pt that she should not be experiencing any chest pain, SOB, fever or lightheadedness. Instructed that the LRA access site should be healing without signs of infection, swelling or drainage. Encouraged to call back with any medication questions or non emergent cardiac related questions and my phone number was provided. Reminded of f/u OV as below. VENANCIO Rodriguez RN.

## 2020-12-02 NOTE — RESULT ENCOUNTER NOTE
Vito Saxena,    I have had the opportunity to review your recent results and an interpretation is as follows:  Your follow up basic metabolic panel shows stable renal function and electrolytes  Your uric acid level also returned stable indicating good control of gout with use of allopurinol      Sincerely,  Mikie Lazaro MD

## 2020-12-04 ENCOUNTER — MYC MEDICAL ADVICE (OUTPATIENT)
Dept: FAMILY MEDICINE | Facility: CLINIC | Age: 65
End: 2020-12-04

## 2020-12-04 NOTE — TELEPHONE ENCOUNTER
Left a message to patient and sent a my chart message.    It looks like PSA was not drawn per . Please help patient schedule a lab appointment at his convenience.    Shakira Kennedy CMA

## 2020-12-15 DIAGNOSIS — Z12.5 ENCOUNTER FOR SCREENING FOR MALIGNANT NEOPLASM OF PROSTATE: ICD-10-CM

## 2020-12-15 DIAGNOSIS — Z00.00 ROUTINE GENERAL MEDICAL EXAMINATION AT A HEALTH CARE FACILITY: ICD-10-CM

## 2020-12-15 PROCEDURE — G0103 PSA SCREENING: HCPCS | Performed by: INTERNAL MEDICINE

## 2020-12-15 PROCEDURE — 36415 COLL VENOUS BLD VENIPUNCTURE: CPT | Performed by: INTERNAL MEDICINE

## 2020-12-16 LAB — PSA SERPL-ACNC: 1.42 UG/L (ref 0–4)

## 2020-12-16 NOTE — RESULT ENCOUNTER NOTE
Vito Saxena,    I have had the opportunity to review your recent results and an interpretation is as follows:  Your PSA level is also stable indicating no evidence of prostate cancer      Sincerely,  Mikie Lazaro MD

## 2020-12-17 ENCOUNTER — OFFICE VISIT (OUTPATIENT)
Dept: CARDIOLOGY | Facility: CLINIC | Age: 65
End: 2020-12-17
Payer: COMMERCIAL

## 2020-12-17 VITALS
WEIGHT: 174 LBS | HEART RATE: 56 BPM | DIASTOLIC BLOOD PRESSURE: 81 MMHG | OXYGEN SATURATION: 100 % | SYSTOLIC BLOOD PRESSURE: 134 MMHG | BODY MASS INDEX: 24.27 KG/M2

## 2020-12-17 DIAGNOSIS — E78.5 HYPERLIPIDEMIA LDL GOAL <70: ICD-10-CM

## 2020-12-17 DIAGNOSIS — I25.118 CORONARY ARTERY DISEASE OF NATIVE ARTERY OF NATIVE HEART WITH STABLE ANGINA PECTORIS (H): Primary | ICD-10-CM

## 2020-12-17 PROCEDURE — 99214 OFFICE O/P EST MOD 30 MIN: CPT | Performed by: PHYSICIAN ASSISTANT

## 2020-12-17 RX ORDER — ISOSORBIDE MONONITRATE 30 MG/1
TABLET, EXTENDED RELEASE ORAL
Qty: 90 TABLET | Refills: 3 | COMMUNITY
Start: 2020-12-17 | End: 2021-02-01

## 2020-12-17 NOTE — LETTER
12/17/2020    Mikie Lazaro MD, MD  [address]  [facility] [address]    RE: Odessa LOPEZ Lexi       Dear Colleague,    I had the pleasure of seeing Odessa JESSICA ZaidiLexi in the [facility].    Primary Cardiologist: Dr. Perez    Reason for Visit: Hospital follow up    History of Present Illness:   Odessa is a pleasant 65 year old male with past medical history notable for CAD (hx of CABG x5 in 09/2013 with LIMA-LAD, SVG-PDA, SVG-PL, SVG-OM1, SVG- OM2), aortic root/ascending aorta dilatation (4.4 cm at root; 4 cm at proximal portion; stable), and hyperlipidemia.     He was admitted recently with chest pain. He was ultimately taken to cath lab where it was demonstrated that he had patent grafts. He was noted to have proximal LAD to ostial D1 lesion that was stable but could be amenable to PCI if symptoms did not improve with antianginal therapies. He had another mid LAD and ostial D2 disease which were backfilled with LIMA graft but this felt to be not appropriate for possible PCI. Echo was performed and this showed structurally normal heart. He was noted to have moderate aortic root dilatation and mild ascending aorta dilatation both of which were stable compared to previous echo. He was discharged to home with 7-day ZIOpatch monitor and new prescriptions of imdur 30 mg and lisinopril 2.5 mg. Plan was made to consider PCI if patient's symptoms did not improve after escalation and addition of antianginals.     Odessa reports he is doing well but he continues to have exertional chest discomfort. He is not sure if the addition of imdur has made a significant difference. He is a very active individual and does long distance biking even during winter. He had some headache from imdur initially but this has improved. He does not any significant lightheadedness.     Assessment and Plan:  Odessa is a pleasant 65 year old male with past medical history notable for CAD (hx of CABG x5 in 09/2013 with  LIMA-LAD, SVG-PDA, SVG-PL, SVG-OM1, SVG- OM2), aortic root/ascending aorta dilatation (4.4 cm at root; 4 cm at proximal portion; stable), and hyperlipidemia.     ZIOpatch monitor showed NSR with one short burst of atrial tachycardia (6 beats). There were no significant arrhythmias noted.     We talked about his coronary angiogram results in detail. He continues to have stable angina and therefore we will increase imdur to 45 mg daily and after 2 weeks to 60 mg daily. If he continues to have chest discomfort, we can consider addition of amlodipine or ranexa. I will have him follow up with Dr. Perez in one month for re-evaluation. If we fail to control angina with higher doses of imdur and other medications, then there will be discussion for therapeutic angiogram. I will also see if Dr. Perez would be agreeable to add zetia to drive LDL further down. His current LDL is very good at 62.      This note was completed in part using Dragon voice recognition software. Although reviewed after completion, some word and grammatical errors may occur.    Orders this Visit:  Orders Placed This Encounter   Procedures     Follow-Up with Cardiologist     Follow-Up with Cardiologist     Orders Placed This Encounter   Medications     isosorbide mononitrate (IMDUR) 30 MG 24 hr tablet     Sig: Increase imdur to 45 mg (one and a half tablet) for 2 weeks and then if feeling ok increase it to 2 tablets (60 mg) once a day.     Dispense:  90 tablet     Refill:  3     Medications Discontinued During This Encounter   Medication Reason     isosorbide mononitrate (IMDUR) 30 MG 24 hr tablet          Encounter Diagnoses   Name Primary?     Coronary artery disease of native artery of native heart with stable angina pectoris (H) Yes     Hyperlipidemia LDL goal <70        CURRENT MEDICATIONS:  Current Outpatient Medications   Medication Sig Dispense Refill     allopurinol (ZYLOPRIM) 100 MG tablet Take 1 tablet (100 mg) by mouth daily 90 tablet 3      aspirin 81 MG tablet Take 81 mg by mouth daily       atorvastatin (LIPITOR) 80 MG tablet Take 1 tablet (80 mg) by mouth daily 90 tablet 3     isosorbide mononitrate (IMDUR) 30 MG 24 hr tablet Increase imdur to 45 mg (one and a half tablet) for 2 weeks and then if feeling ok increase it to 2 tablets (60 mg) once a day. 90 tablet 3     lisinopril (ZESTRIL) 2.5 MG tablet Take 1 tablet (2.5 mg) by mouth daily 90 tablet 3     metoprolol succinate ER (TOPROL-XL) 25 MG 24 hr tablet Take 0.5 tablets (12.5 mg) by mouth daily 45 tablet 3     Multiple Vitamin (MULTI VITAMIN DAILY PO) Take 1 tablet by mouth daily        NONFORMULARY Take by mouth daily Collagen 1 scoop       Vitamin D, Cholecalciferol, 1000 UNITS TABS Take 1,000 Units by mouth daily        colchicine (COLCRYS) 0.6 MG tablet Take 2 tabs (1.2mg) by mouth at the first sign of a gout attack, then 1 tab (0.6mg) 1 hour later.  Max 3 tabs (1.8mg) over 1 hour. (Patient not taking: Reported on 12/17/2020) 30 tablet 3     nitroGLYcerin (NITROSTAT) 0.4 MG sublingual tablet For chest pain place 1 tablet under the tongue every 5 minutes for 3 doses. If symptoms persist 5 minutes after 1st dose call 911. (Patient not taking: Reported on 12/17/2020) 30 tablet 0       ALLERGIES     Allergies   Allergen Reactions     No Known Allergies        PAST MEDICAL HISTORY:  Past Medical History:   Diagnosis Date     Acute gouty arthritis      Chest pain      Coronary artery disease     s/p 5v CABG     Hyperlipidaemia      NSTEMI (non-ST elevated myocardial infarction) (H) 11/24/2020     Shortness of breath      Urinary retention        PAST SURGICAL HISTORY:  Past Surgical History:   Procedure Laterality Date     BYPASS GRAFT ARTERY CORONARY  9/23/2013    Procedure: BYPASS GRAFT ARTERY CORONARY;  CORONARY ARTERY BYPASS GRAFT X 5  WITH ENDOSCOPIC VEIN HARVESTING (LAD, PDA, SIMRAN, OM1 AND OM2 );  Surgeon: Kwadwo Canales MD;  Location: [facility] OR     COLONOSCOPY  10/2013    Shortly after bypass  surgery     CORONARY ARTERY BYPASS  2013    LIMA =LAD,svg=PDA,Svg=PLRca, Svg=Om1,Svg=OM2     CV HEART CATHETERIZATION WITH POSSIBLE INTERVENTION N/A 11/24/2020    Procedure: Heart Catheterization with Possible Intervention;  Surgeon: William Collier MD;  Location:  HEART CARDIAC CATH LAB     NO HISTORY OF SURGERY       ORTHOPEDIC SURGERY      cortisone shot in back for pain       FAMILY HISTORY:  Family History   Problem Relation Age of Onset     Lipids Mother      Hypertension Mother      Hyperlipidemia Mother      Cancer Paternal Grandmother      Hypertension Father      Hyperlipidemia Father      C.A.D. No family hx of      Cerebrovascular Disease No family hx of        SOCIAL HISTORY:  Social History     Socioeconomic History     Marital status:      Spouse name: None     Number of children: None     Years of education: None     Highest education level: None   Occupational History     None   Social Needs     Financial resource strain: None     Food insecurity     Worry: None     Inability: None     Transportation needs     Medical: None     Non-medical: None   Tobacco Use     Smoking status: Never Smoker     Smokeless tobacco: Never Used   Substance and Sexual Activity     Alcohol use: Not Currently     Alcohol/week: 6.0 standard drinks     Comment: ocassional beer or wine.     Drug use: No     Sexual activity: Yes     Partners: Female     Birth control/protection: None     Comment: Recent occ. of gout in left foot-colcrys & indomethacin   Lifestyle     Physical activity     Days per week: None     Minutes per session: None     Stress: None   Relationships     Social connections     Talks on phone: None     Gets together: None     Attends Restoration service: None     Active member of club or organization: None     Attends meetings of clubs or organizations: None     Relationship status: None     Intimate partner violence     Fear of current or ex partner: None     Emotionally abused: None      Physically abused: None     Forced sexual activity: None   Other Topics Concern      Service Not Asked     Blood Transfusions Not Asked     Caffeine Concern Yes     Comment: 4 cups coffee daily     Occupational Exposure Not Asked     Hobby Hazards Not Asked     Sleep Concern No     Stress Concern Not Asked     Weight Concern Not Asked     Special Diet Not Asked     Back Care Not Asked     Exercise Yes     Comment: daily walking, biking, golf      Bike Helmet Not Asked     Seat Belt Yes     Self-Exams Not Asked     Parent/sibling w/ CABG, MI or angioplasty before 65F 55M? No   Social History Narrative    , 1 daughter    Employed for [employer]       Review of Systems:  Skin:  Negative     Eyes:  Positive for glasses  ENT:  Negative    Respiratory:  Negative    Cardiovascular:  Negative Positive for;chest pain(sharp chest pain occ)  Gastroenterology: Negative    Genitourinary:  not assessed nocturia  Musculoskeletal:  Negative joint pain  Neurologic:  Negative    Psychiatric:  Negative    Heme/Lymph/Imm:  Negative    Endocrine:  Negative      Physical Exam:  Vitals: /81   Pulse 56   Wt 78.9 kg (174 lb)   SpO2 100%   BMI 24.27 kg/m       GEN:  NAD. Thin.  NECK: No JVD  C/V:  Regular rate and rhythm, no murmur, rub or gallop.  RESP: Clear to auscultation bilaterally.  GI: Abdomen soft, nontender, nondistended.   EXTREM: No pitting LE edema.   NEURO: Alert and oriented, cooperative. No obvious focal deficits.   PSYCH: Normal affect.  SKIN: Warm and dry.       Recent Lab Results:  LIPID RESULTS:  Lab Results   Component Value Date    CHOL 141 11/25/2020    HDL 66 11/25/2020    LDL 62 11/25/2020    TRIG 65 11/25/2020    CHOLHDLRATIO 2.5 04/28/2015       LIVER ENZYME RESULTS:  Lab Results   Component Value Date    AST 37 11/25/2020    ALT 41 11/25/2020       CBC RESULTS:  Lab Results   Component Value Date    WBC 3.9 (L) 11/25/2020    RBC 4.80 11/25/2020    HGB 15.1  11/25/2020    HCT 44.2 11/25/2020    MCV 92 11/25/2020    MCH 31.5 11/25/2020    MCHC 34.2 11/25/2020    RDW 13.5 11/25/2020     11/25/2020       BMP RESULTS:  Lab Results   Component Value Date     11/30/2020    POTASSIUM 4.4 11/30/2020    CHLORIDE 108 11/30/2020    CO2 28 11/30/2020    ANIONGAP 3 11/30/2020    GLC 73 11/30/2020    BUN 14 11/30/2020    CR 0.86 11/30/2020    GFRESTIMATED 90 11/30/2020    GFRESTBLACK >90 11/30/2020    MARU 9.2 11/30/2020        A1C RESULTS:  Lab Results   Component Value Date    A1C 5.0 11/24/2020       INR RESULTS:  Lab Results   Component Value Date    INR 1.06 09/30/2013    INR 1.22 (H) 09/25/2013           Vanessa Roth PA-C  December 17, 2020         Thank you for allowing me to participate in the care of your patient.    Sincerely,     Vanessa Roth PA-C     [facility]

## 2020-12-17 NOTE — PROGRESS NOTES
Primary Cardiologist: Dr. Perez    Reason for Visit: Hospital follow up    History of Present Illness:   Odessa is a pleasant 65 year old male with past medical history notable for CAD (hx of CABG x5 in 09/2013 with LIMA-LAD, SVG-PDA, SVG-PL, SVG-OM1, SVG- OM2), aortic root/ascending aorta dilatation (4.4 cm at root; 4 cm at proximal portion; stable), and hyperlipidemia.     He was admitted recently with chest pain. He was ultimately taken to cath lab where it was demonstrated that he had patent grafts. He was noted to have proximal LAD to ostial D1 lesion that was stable but could be amenable to PCI if symptoms did not improve with antianginal therapies. He had another mid LAD and ostial D2 disease which were backfilled with LIMA graft but this felt to be not appropriate for possible PCI. Echo was performed and this showed structurally normal heart. He was noted to have moderate aortic root dilatation and mild ascending aorta dilatation both of which were stable compared to previous echo. He was discharged to home with 7-day ZIOpatch monitor and new prescriptions of imdur 30 mg and lisinopril 2.5 mg. Plan was made to consider PCI if patient's symptoms did not improve after escalation and addition of antianginals.     Odessa reports he is doing well but he continues to have exertional chest discomfort. He is not sure if the addition of imdur has made a significant difference. He is a very active individual and does long distance biking even during winter. He had some headache from imdur initially but this has improved. He does not any significant lightheadedness.     Assessment and Plan:  Odessa is a pleasant 65 year old male with past medical history notable for CAD (hx of CABG x5 in 09/2013 with LIMA-LAD, SVG-PDA, SVG-PL, SVG-OM1, SVG- OM2), aortic root/ascending aorta dilatation (4.4 cm at root; 4 cm at proximal portion; stable), and hyperlipidemia.     ZIOpatch monitor showed NSR with one short burst of atrial tachycardia  (6 beats). There were no significant arrhythmias noted.     We talked about his coronary angiogram results in detail. He continues to have stable angina and therefore we will increase imdur to 45 mg daily and after 2 weeks to 60 mg daily. If he continues to have chest discomfort, we can consider addition of amlodipine or ranexa. I will have him follow up with Dr. Perez in one month for re-evaluation. If we fail to control angina with higher doses of imdur and other medications, then there will be discussion for therapeutic angiogram. I will also see if Dr. Perez would be agreeable to add zetia to drive LDL further down. His current LDL is very good at 62.      This note was completed in part using Dragon voice recognition software. Although reviewed after completion, some word and grammatical errors may occur.    Orders this Visit:  Orders Placed This Encounter   Procedures     Follow-Up with Cardiologist     Follow-Up with Cardiologist     Orders Placed This Encounter   Medications     isosorbide mononitrate (IMDUR) 30 MG 24 hr tablet     Sig: Increase imdur to 45 mg (one and a half tablet) for 2 weeks and then if feeling ok increase it to 2 tablets (60 mg) once a day.     Dispense:  90 tablet     Refill:  3     Medications Discontinued During This Encounter   Medication Reason     isosorbide mononitrate (IMDUR) 30 MG 24 hr tablet          Encounter Diagnoses   Name Primary?     Coronary artery disease of native artery of native heart with stable angina pectoris (H) Yes     Hyperlipidemia LDL goal <70        CURRENT MEDICATIONS:  Current Outpatient Medications   Medication Sig Dispense Refill     allopurinol (ZYLOPRIM) 100 MG tablet Take 1 tablet (100 mg) by mouth daily 90 tablet 3     aspirin 81 MG tablet Take 81 mg by mouth daily       atorvastatin (LIPITOR) 80 MG tablet Take 1 tablet (80 mg) by mouth daily 90 tablet 3     isosorbide mononitrate (IMDUR) 30 MG 24 hr tablet Increase imdur to 45 mg (one and a half  tablet) for 2 weeks and then if feeling ok increase it to 2 tablets (60 mg) once a day. 90 tablet 3     lisinopril (ZESTRIL) 2.5 MG tablet Take 1 tablet (2.5 mg) by mouth daily 90 tablet 3     metoprolol succinate ER (TOPROL-XL) 25 MG 24 hr tablet Take 0.5 tablets (12.5 mg) by mouth daily 45 tablet 3     Multiple Vitamin (MULTI VITAMIN DAILY PO) Take 1 tablet by mouth daily        NONFORMULARY Take by mouth daily Collagen 1 scoop       Vitamin D, Cholecalciferol, 1000 UNITS TABS Take 1,000 Units by mouth daily        colchicine (COLCRYS) 0.6 MG tablet Take 2 tabs (1.2mg) by mouth at the first sign of a gout attack, then 1 tab (0.6mg) 1 hour later.  Max 3 tabs (1.8mg) over 1 hour. (Patient not taking: Reported on 12/17/2020) 30 tablet 3     nitroGLYcerin (NITROSTAT) 0.4 MG sublingual tablet For chest pain place 1 tablet under the tongue every 5 minutes for 3 doses. If symptoms persist 5 minutes after 1st dose call 911. (Patient not taking: Reported on 12/17/2020) 30 tablet 0       ALLERGIES     Allergies   Allergen Reactions     No Known Allergies        PAST MEDICAL HISTORY:  Past Medical History:   Diagnosis Date     Acute gouty arthritis      Chest pain      Coronary artery disease     s/p 5v CABG     Hyperlipidaemia      NSTEMI (non-ST elevated myocardial infarction) (H) 11/24/2020     Shortness of breath      Urinary retention        PAST SURGICAL HISTORY:  Past Surgical History:   Procedure Laterality Date     BYPASS GRAFT ARTERY CORONARY  9/23/2013    Procedure: BYPASS GRAFT ARTERY CORONARY;  CORONARY ARTERY BYPASS GRAFT X 5  WITH ENDOSCOPIC VEIN HARVESTING (LAD, PDA, SIMRAN, OM1 AND OM2 );  Surgeon: Kwadwo Canales MD;  Location: [facility] OR     COLONOSCOPY  10/2013    Shortly after bypass surgery     CORONARY ARTERY BYPASS  2013    LIMA =LAD,svg=PDA,Svg=PLRca, Svg=Om1,Svg=OM2     CV HEART CATHETERIZATION WITH POSSIBLE INTERVENTION N/A 11/24/2020    Procedure: Heart Catheterization with Possible Intervention;  Surgeon:  William Collier MD;  Location:  HEART CARDIAC CATH LAB     NO HISTORY OF SURGERY       ORTHOPEDIC SURGERY      cortisone shot in back for pain       FAMILY HISTORY:  Family History   Problem Relation Age of Onset     Lipids Mother      Hypertension Mother      Hyperlipidemia Mother      Cancer Paternal Grandmother      Hypertension Father      Hyperlipidemia Father      C.A.D. No family hx of      Cerebrovascular Disease No family hx of        SOCIAL HISTORY:  Social History     Socioeconomic History     Marital status:      Spouse name: None     Number of children: None     Years of education: None     Highest education level: None   Occupational History     None   Social Needs     Financial resource strain: None     Food insecurity     Worry: None     Inability: None     Transportation needs     Medical: None     Non-medical: None   Tobacco Use     Smoking status: Never Smoker     Smokeless tobacco: Never Used   Substance and Sexual Activity     Alcohol use: Not Currently     Alcohol/week: 6.0 standard drinks     Comment: ocassional beer or wine.     Drug use: No     Sexual activity: Yes     Partners: Female     Birth control/protection: None     Comment: Recent occ. of gout in left foot-colcrys & indomethacin   Lifestyle     Physical activity     Days per week: None     Minutes per session: None     Stress: None   Relationships     Social connections     Talks on phone: None     Gets together: None     Attends Denominational service: None     Active member of club or organization: None     Attends meetings of clubs or organizations: None     Relationship status: None     Intimate partner violence     Fear of current or ex partner: None     Emotionally abused: None     Physically abused: None     Forced sexual activity: None   Other Topics Concern      Service Not Asked     Blood Transfusions Not Asked     Caffeine Concern Yes     Comment: 4 cups coffee daily     Occupational Exposure Not Asked      Hobby Hazards Not Asked     Sleep Concern No     Stress Concern Not Asked     Weight Concern Not Asked     Special Diet Not Asked     Back Care Not Asked     Exercise Yes     Comment: daily walking, biking, golf      Bike Helmet Not Asked     Seat Belt Yes     Self-Exams Not Asked     Parent/sibling w/ CABG, MI or angioplasty before 65F 55M? No   Social History Narrative    , 1 daughter    Employed for [employer]       Review of Systems:  Skin:  Negative     Eyes:  Positive for glasses  ENT:  Negative    Respiratory:  Negative    Cardiovascular:  Negative Positive for;chest pain(sharp chest pain occ)  Gastroenterology: Negative    Genitourinary:  not assessed nocturia  Musculoskeletal:  Negative joint pain  Neurologic:  Negative    Psychiatric:  Negative    Heme/Lymph/Imm:  Negative    Endocrine:  Negative      Physical Exam:  Vitals: /81   Pulse 56   Wt 78.9 kg (174 lb)   SpO2 100%   BMI 24.27 kg/m       GEN:  NAD. Thin.  NECK: No JVD  C/V:  Regular rate and rhythm, no murmur, rub or gallop.  RESP: Clear to auscultation bilaterally.  GI: Abdomen soft, nontender, nondistended.   EXTREM: No pitting LE edema.   NEURO: Alert and oriented, cooperative. No obvious focal deficits.   PSYCH: Normal affect.  SKIN: Warm and dry.       Recent Lab Results:  LIPID RESULTS:  Lab Results   Component Value Date    CHOL 141 11/25/2020    HDL 66 11/25/2020    LDL 62 11/25/2020    TRIG 65 11/25/2020    CHOLHDLRATIO 2.5 04/28/2015       LIVER ENZYME RESULTS:  Lab Results   Component Value Date    AST 37 11/25/2020    ALT 41 11/25/2020       CBC RESULTS:  Lab Results   Component Value Date    WBC 3.9 (L) 11/25/2020    RBC 4.80 11/25/2020    HGB 15.1 11/25/2020    HCT 44.2 11/25/2020    MCV 92 11/25/2020    MCH 31.5 11/25/2020    MCHC 34.2 11/25/2020    RDW 13.5 11/25/2020     11/25/2020       BMP RESULTS:  Lab Results   Component Value Date     11/30/2020    POTASSIUM 4.4  11/30/2020    CHLORIDE 108 11/30/2020    CO2 28 11/30/2020    ANIONGAP 3 11/30/2020    GLC 73 11/30/2020    BUN 14 11/30/2020    CR 0.86 11/30/2020    GFRESTIMATED 90 11/30/2020    GFRESTBLACK >90 11/30/2020    MARU 9.2 11/30/2020        A1C RESULTS:  Lab Results   Component Value Date    A1C 5.0 11/24/2020       INR RESULTS:  Lab Results   Component Value Date    INR 1.06 09/30/2013    INR 1.22 (H) 09/25/2013           Vanessa Roth PAAmariC  December 17, 2020

## 2020-12-17 NOTE — PATIENT INSTRUCTIONS
Today's Plan:   1) Increase imdur (one and a half tablet- 45 mg) once a day. If you are feeling ok and have no lightheadedness or headache, we will have you increase this to 2 tablets (60 mg) once a day.   2) [facility] one [facility]     If you have questions or concerns please call my nurse team at [phone].     Scheduling phone number: [phone]  Reminder: Please bring in all current medications, over the counter supplements and vitamin bottles to your next appointment.    It was a pleasure seeing you today!     Vanessa Roth PA-C  12/17/2020

## 2021-01-29 DIAGNOSIS — I25.10 CORONARY ARTERY DISEASE INVOLVING NATIVE CORONARY ARTERY OF NATIVE HEART WITHOUT ANGINA PECTORIS: ICD-10-CM

## 2021-01-30 RX ORDER — METOPROLOL SUCCINATE 25 MG/1
TABLET, EXTENDED RELEASE ORAL
Qty: 45 TABLET | Refills: 2 | Status: SHIPPED | OUTPATIENT
Start: 2021-01-30 | End: 2021-08-05

## 2021-02-01 ENCOUNTER — OFFICE VISIT (OUTPATIENT)
Dept: CARDIOLOGY | Facility: CLINIC | Age: 66
End: 2021-02-01
Payer: COMMERCIAL

## 2021-02-01 VITALS
BODY MASS INDEX: 24.27 KG/M2 | DIASTOLIC BLOOD PRESSURE: 82 MMHG | WEIGHT: 174 LBS | OXYGEN SATURATION: 100 % | HEART RATE: 58 BPM | SYSTOLIC BLOOD PRESSURE: 126 MMHG

## 2021-02-01 DIAGNOSIS — I25.118 CORONARY ARTERY DISEASE OF NATIVE ARTERY OF NATIVE HEART WITH STABLE ANGINA PECTORIS (H): Primary | ICD-10-CM

## 2021-02-01 DIAGNOSIS — E78.5 HYPERLIPIDEMIA LDL GOAL <70: ICD-10-CM

## 2021-02-01 PROCEDURE — 99214 OFFICE O/P EST MOD 30 MIN: CPT | Performed by: INTERNAL MEDICINE

## 2021-02-01 RX ORDER — ISOSORBIDE MONONITRATE 60 MG/1
TABLET, EXTENDED RELEASE ORAL
Qty: 60 TABLET | Refills: 3 | Status: SHIPPED | OUTPATIENT
Start: 2021-02-01 | End: 2021-08-05

## 2021-02-01 NOTE — LETTER
2/1/2021    Mikie Lazaro MD, MD  [address]  [facility] [address]    RE: Odessa Elliott       Dear Colleague,    I had the pleasure of seeing Odessa Elliott in the [facility].    HPI and Plan:   See dictation    No orders of the defined types were placed in this encounter.      Orders Placed This Encounter   Medications     isosorbide mononitrate (IMDUR) 60 MG 24 hr tablet     Sig: daily     Dispense:  60 tablet     Refill:  3       Medications Discontinued During This Encounter   Medication Reason     isosorbide mononitrate (IMDUR) 30 MG 24 hr tablet Reorder         Encounter Diagnoses   Name Primary?     Coronary artery disease of native artery of native heart with stable angina pectoris (H) Yes     Hyperlipidemia LDL goal <70        CURRENT MEDICATIONS:  Current Outpatient Medications   Medication Sig Dispense Refill     allopurinol (ZYLOPRIM) 100 MG tablet Take 1 tablet (100 mg) by mouth daily 90 tablet 3     aspirin 81 MG tablet Take 81 mg by mouth daily       atorvastatin (LIPITOR) 80 MG tablet Take 1 tablet (80 mg) by mouth daily 90 tablet 3     isosorbide mononitrate (IMDUR) 60 MG 24 hr tablet daily 60 tablet 3     lisinopril (ZESTRIL) 2.5 MG tablet Take 1 tablet (2.5 mg) by mouth daily 90 tablet 3     metoprolol succinate ER (TOPROL-XL) 25 MG 24 hr tablet TAKE ONE-HALF (1/2) TABLET DAILY 45 tablet 2     Multiple Vitamin (MULTI VITAMIN DAILY PO) Take 1 tablet by mouth daily        NONFORMULARY Take by mouth daily Collagen 1 scoop       Vitamin D, Cholecalciferol, 1000 UNITS TABS Take 1,000 Units by mouth daily        colchicine (COLCRYS) 0.6 MG tablet Take 2 tabs (1.2mg) by mouth at the first sign of a gout attack, then 1 tab (0.6mg) 1 hour later.  Max 3 tabs (1.8mg) over 1 hour. (Patient not taking: Reported on 12/17/2020) 30 tablet 3     nitroGLYcerin (NITROSTAT) 0.4 MG sublingual tablet For chest pain place 1 tablet under the tongue every 5 minutes for 3  doses. If symptoms persist 5 minutes after 1st dose call 911. (Patient not taking: Reported on 12/17/2020) 30 tablet 0       ALLERGIES     Allergies   Allergen Reactions     No Known Allergies        PAST MEDICAL HISTORY:  Past Medical History:   Diagnosis Date     Acute gouty arthritis      Aortic root dilatation (H)      Ascending aorta dilatation (H)      Chest pain      Coronary artery disease      GIB (gastrointestinal bleeding)      Hyperlipidaemia      NSTEMI (non-ST elevated myocardial infarction) (H) 11/24/2020     NSTEMI (non-ST elevated myocardial infarction) (H) 11/24/2020     Osteoarthritis     s/p right IRENE 11/26/19 at [facility]     S/P CABG x 5 2013    LIMA-LAD, SVG-PDA, SVG-PL, SVG-OM1, SVG- OM2 (9/2013)     Shortness of breath      Urinary retention        PAST SURGICAL HISTORY:  Past Surgical History:   Procedure Laterality Date     BYPASS GRAFT ARTERY CORONARY  9/23/2013    Procedure: BYPASS GRAFT ARTERY CORONARY;  CORONARY ARTERY BYPASS GRAFT X 5  WITH ENDOSCOPIC VEIN HARVESTING (LAD, PDA, SIMRAN, OM1 AND OM2 );  Surgeon: Kwadwo Canales MD;  Location:  OR     COLONOSCOPY  10/2013    Shortly after bypass surgery     CORONARY ARTERY BYPASS  2013    LIMA =LAD,svg=PDA,Svg=PLRca, Svg=Om1,Svg=OM2     CV HEART CATHETERIZATION WITH POSSIBLE INTERVENTION N/A 11/24/2020    Procedure: Heart Catheterization with Possible Intervention;  Surgeon: William Collier MD;  Location:  HEART CARDIAC CATH LAB     NO HISTORY OF SURGERY       ORTHOPEDIC SURGERY      cortisone shot in back for pain       FAMILY HISTORY:  Family History   Problem Relation Age of Onset     Lipids Mother      Hypertension Mother      Hyperlipidemia Mother      Cancer Paternal Grandmother      Hypertension Father      Hyperlipidemia Father      C.A.D. No family hx of      Cerebrovascular Disease No family hx of        SOCIAL HISTORY:  Social History     Socioeconomic History     Marital status:      Spouse name: None      Number of children: None     Years of education: None     Highest education level: None   Occupational History     None   Social Needs     Financial resource strain: None     Food insecurity     Worry: None     Inability: None     Transportation needs     Medical: None     Non-medical: None   Tobacco Use     Smoking status: Never Smoker     Smokeless tobacco: Never Used   Substance and Sexual Activity     Alcohol use: Not Currently     Alcohol/week: 6.0 standard drinks     Comment: ocassional beer or wine.     Drug use: No     Sexual activity: Yes     Partners: Female     Birth control/protection: None     Comment: Recent occ. of gout in left foot-colcrys & indomethacin   Lifestyle     Physical activity     Days per week: None     Minutes per session: None     Stress: None   Relationships     Social connections     Talks on phone: None     Gets together: None     Attends Hoahaoism service: None     Active member of club or organization: None     Attends meetings of clubs or organizations: None     Relationship status: None     Intimate partner violence     Fear of current or ex partner: None     Emotionally abused: None     Physically abused: None     Forced sexual activity: None   Other Topics Concern      Service Not Asked     Blood Transfusions Not Asked     Caffeine Concern Yes     Comment: 4 cups coffee daily     Occupational Exposure Not Asked     Hobby Hazards Not Asked     Sleep Concern No     Stress Concern Not Asked     Weight Concern Not Asked     Special Diet Not Asked     Back Care Not Asked     Exercise Yes     Comment: daily walking, biking, golf      Bike Helmet Not Asked     Seat Belt Yes     Self-Exams Not Asked     Parent/sibling w/ CABG, MI or angioplasty before 65F 55M? No   Social History Narrative    , 1 daughter    Employed for [employer]       Review of Systems:  Skin:  Negative       Eyes:  Positive for glasses    ENT:  Negative     "  Respiratory:  Negative       Cardiovascular:  Negative Positive for;chest pain(sharp chest pain occ) CP described as \"burning sensation\" w/slight heaviness, with activity  Gastroenterology: Negative      Genitourinary:  not assessed nocturia    Musculoskeletal:  Negative joint pain    Neurologic:  Negative      Psychiatric:  Negative      Heme/Lymph/Imm:  Negative      Endocrine:  Negative        Physical Exam:  Vitals: /82   Pulse 58   Wt 78.9 kg (174 lb)   SpO2 100%   BMI 24.27 kg/m      Constitutional:  cooperative;in no acute distress        Skin:  warm and dry to the touch          Head:  normocephalic        Eyes:  conjunctivae and lids unremarkable        Lymph:      ENT:  no pallor or cyanosis        Neck:  JVP normal;no carotid bruit        Respiratory:  clear to auscultation         Cardiac: regular rhythm;no murmurs, gallops or rubs detected                pulses full and equal                                        GI:  abdomen soft;non-tender        Extremities and Muscular Skeletal:  no edema              Neurological:  no gross motor deficits        Psych:  Alert and Oriented x 3        CC  Vanessa Roth PA-C  [address]  [place] [address]                  Thank you for allowing me to participate in the care of your patient.      Sincerely,     Armani Perez MD, MD     [facility] [place]    cc:   Vanessa Roth PA-C  [address] [address]        "

## 2021-02-01 NOTE — LETTER
2/1/2021      Mikie Lazaro MD, MD  [address]  [facility] [address]      RE: Odessa Elliott       Dear Colleague,    I had the pleasure of seeing Odessa Zaidintz in the [facility].    Service Date: 02/01/2021      OFFICE PROGRESS NOTE       REASON FOR CONSULTATION:  Followup for coronary artery disease.      HISTORY OF PRESENT ILLNESS:  The patient is a very pleasant 65-year-old gentleman with known coronary artery disease and hyperlipidemia.  I have followed him closely over the years, although I have not seen him over the last 3-1/2 years.  He underwent coronary artery bypass surgery x5 in 2013.  At that time, he had a LIMA to the LAD, vein graft to the right posterior descending artery, vein graft to the posterolateral segment, vein graft to obtuse marginal 1 and a vein graft to the obtuse marginal branch 2.  He did well after his surgery until this past fall.  Starting in October, he noted exertional chest burning.  The episodes were initially brought on by strenuous activity but subsequently came on with usual walks.  His wife who is a nurse was not aware of his symptoms.  When he mentioned those symptoms, she brought him to the emergency department.  This was in 11/2020.  His troponin was mildly elevated without significant delta.  He then proceeded to have coronary and graft angiography which I have reviewed personally.  It revealed, as expected, severe native vessel coronary artery disease.  His LIMA to the LAD was widely patent.  His SVG to the RCA was also widely patent.  The SVG to the right posterolateral branch was also patent, although the graft was small.  The vein graft to the first obtuse marginal branch was patent but it had moderate disease proximally.  The SVG to the second obtuse marginal branch was also patent but small.  His RCA is chronically occluded.  The left main has moderate disease.  The proximal LAD which supplies a decent-sized diagonal  had a calcific 95% stenosis.  After that angiogram, initial medical therapy was recommended.      He is currently on Imdur 45 mg daily.  Despite this, he continues to have exertional chest burning.  Echocardiogram during his recent hospitalization showed preserved LV function with an EF of 60%-65%.      ASSESSMENT AND PLAN:   1.  Stable angina.   2.  Coronary artery disease status post prior CABG x5 in 2013.   3.  Hyperlipidemia.      He continues to have exertional chest discomfort.  His symptoms are still stable.  He has not had any resting symptoms.  There has not been any escalation of symptoms.      I reviewed his angiogram image with him again.  We discussed escalating medical therapy versus repeat angiogram and possible revascularization of the diagonal branch.  He prefers escalation of medical therapy initially.  We will increase Imdur to 60 mg daily.  If he continues to have symptoms, we would consider repeat angiogram and possible revascularization of the proximal LAD into the diagonal to relieve symptoms.      He will continue aspirin, atorvastatin and metoprolol.      It was a pleasure seeing Mr. Thomas in the clinic this morning.  I appreciate the opportunity to participate in his care.         JABIER VALENCIA MD             D: 2021   T: 2021   MT: MERLY      Name:     AVINASH THOMAS   MRN:      [id number]        Account:      [id number]   :      1955           Service Date: 2021      Document: [id number]        Outpatient Encounter Medications as of 2021   Medication Sig Dispense Refill     allopurinol (ZYLOPRIM) 100 MG tablet Take 1 tablet (100 mg) by mouth daily 90 tablet 3     aspirin 81 MG tablet Take 81 mg by mouth daily       atorvastatin (LIPITOR) 80 MG tablet Take 1 tablet (80 mg) by mouth daily 90 tablet 3     isosorbide mononitrate (IMDUR) 60 MG 24 hr tablet daily 60 tablet 3     lisinopril (ZESTRIL) 2.5 MG tablet Take 1 tablet (2.5 mg) by mouth daily 90 tablet  3     metoprolol succinate ER (TOPROL-XL) 25 MG 24 hr tablet TAKE ONE-HALF (1/2) TABLET DAILY 45 tablet 2     Multiple Vitamin (MULTI VITAMIN DAILY PO) Take 1 tablet by mouth daily        NONFORMULARY Take by mouth daily Collagen 1 scoop       Vitamin D, Cholecalciferol, 1000 UNITS TABS Take 1,000 Units by mouth daily        colchicine (COLCRYS) 0.6 MG tablet Take 2 tabs (1.2mg) by mouth at the first sign of a gout attack, then 1 tab (0.6mg) 1 hour later.  Max 3 tabs (1.8mg) over 1 hour. (Patient not taking: Reported on 12/17/2020) 30 tablet 3     nitroGLYcerin (NITROSTAT) 0.4 MG sublingual tablet For chest pain place 1 tablet under the tongue every 5 minutes for 3 doses. If symptoms persist 5 minutes after 1st dose call 911. (Patient not taking: Reported on 12/17/2020) 30 tablet 0     [DISCONTINUED] isosorbide mononitrate (IMDUR) 30 MG 24 hr tablet Increase imdur to 45 mg (one and a half tablet) for 2 weeks and then if feeling ok increase it to 2 tablets (60 mg) once a day. 90 tablet 3     No facility-administered encounter medications on file as of 2/1/2021.        Again, thank you for allowing me to participate in the care of your patient.      Sincerely,    Armani Perez MD, MD     [facility]

## 2021-02-01 NOTE — PROGRESS NOTES
Service Date: 02/01/2021      OFFICE PROGRESS NOTE       REASON FOR CONSULTATION:  Followup for coronary artery disease.      HISTORY OF PRESENT ILLNESS:  The patient is a very pleasant 65-year-old gentleman with known coronary artery disease and hyperlipidemia.  I have followed him closely over the years, although I have not seen him over the last 3-1/2 years.  He underwent coronary artery bypass surgery x5 in 2013.  At that time, he had a LIMA to the LAD, vein graft to the right posterior descending artery, vein graft to the posterolateral segment, vein graft to obtuse marginal 1 and a vein graft to the obtuse marginal branch 2.  He did well after his surgery until this past fall.  Starting in October, he noted exertional chest burning.  The episodes were initially brought on by strenuous activity but subsequently came on with usual walks.  His wife who is a nurse was not aware of his symptoms.  When he mentioned those symptoms, she brought him to the emergency department.  This was in 11/2020.  His troponin was mildly elevated without significant delta.  He then proceeded to have coronary and graft angiography which I have reviewed personally.  It revealed, as expected, severe native vessel coronary artery disease.  His LIMA to the LAD was widely patent.  His SVG to the RCA was also widely patent.  The SVG to the right posterolateral branch was also patent, although the graft was small.  The vein graft to the first obtuse marginal branch was patent but it had moderate disease proximally.  The SVG to the second obtuse marginal branch was also patent but small.  His RCA is chronically occluded.  The left main has moderate disease.  The proximal LAD which supplies a decent-sized diagonal had a calcific 95% stenosis.  After that angiogram, initial medical therapy was recommended.      He is currently on Imdur 45 mg daily.  Despite this, he continues to have exertional chest burning.  Echocardiogram during his recent  hospitalization showed preserved LV function with an EF of 60%-65%.      ASSESSMENT AND PLAN:   1.  Stable angina.   2.  Coronary artery disease status post prior CABG x5 in 2013.   3.  Hyperlipidemia.      He continues to have exertional chest discomfort.  His symptoms are still stable.  He has not had any resting symptoms.  There has not been any escalation of symptoms.      I reviewed his angiogram image with him again.  We discussed escalating medical therapy versus repeat angiogram and possible revascularization of the diagonal branch.  He prefers escalation of medical therapy initially.  We will increase Imdur to 60 mg daily.  If he continues to have symptoms, we would consider repeat angiogram and possible revascularization of the proximal LAD into the diagonal to relieve symptoms.      He will continue aspirin, atorvastatin and metoprolol.      It was a pleasure seeing Mr. Thomas in the clinic this morning.  I appreciate the opportunity to participate in his care.         JABIER VALENCIA MD             D: 2021   T: 2021   MT: MERLY      Name:     AVINASH THOMAS   MRN:      [id number]        Account:      [id number]   :      1955           Service Date: 2021      Document: [id number]

## 2021-02-01 NOTE — PROGRESS NOTES
HPI and Plan:   See dictation    No orders of the defined types were placed in this encounter.      Orders Placed This Encounter   Medications     isosorbide mononitrate (IMDUR) 60 MG 24 hr tablet     Sig: daily     Dispense:  60 tablet     Refill:  3       Medications Discontinued During This Encounter   Medication Reason     isosorbide mononitrate (IMDUR) 30 MG 24 hr tablet Reorder         Encounter Diagnoses   Name Primary?     Coronary artery disease of native artery of native heart with stable angina pectoris (H) Yes     Hyperlipidemia LDL goal <70        CURRENT MEDICATIONS:  Current Outpatient Medications   Medication Sig Dispense Refill     allopurinol (ZYLOPRIM) 100 MG tablet Take 1 tablet (100 mg) by mouth daily 90 tablet 3     aspirin 81 MG tablet Take 81 mg by mouth daily       atorvastatin (LIPITOR) 80 MG tablet Take 1 tablet (80 mg) by mouth daily 90 tablet 3     isosorbide mononitrate (IMDUR) 60 MG 24 hr tablet daily 60 tablet 3     lisinopril (ZESTRIL) 2.5 MG tablet Take 1 tablet (2.5 mg) by mouth daily 90 tablet 3     metoprolol succinate ER (TOPROL-XL) 25 MG 24 hr tablet TAKE ONE-HALF (1/2) TABLET DAILY 45 tablet 2     Multiple Vitamin (MULTI VITAMIN DAILY PO) Take 1 tablet by mouth daily        NONFORMULARY Take by mouth daily Collagen 1 scoop       Vitamin D, Cholecalciferol, 1000 UNITS TABS Take 1,000 Units by mouth daily        colchicine (COLCRYS) 0.6 MG tablet Take 2 tabs (1.2mg) by mouth at the first sign of a gout attack, then 1 tab (0.6mg) 1 hour later.  Max 3 tabs (1.8mg) over 1 hour. (Patient not taking: Reported on 12/17/2020) 30 tablet 3     nitroGLYcerin (NITROSTAT) 0.4 MG sublingual tablet For chest pain place 1 tablet under the tongue every 5 minutes for 3 doses. If symptoms persist 5 minutes after 1st dose call 911. (Patient not taking: Reported on 12/17/2020) 30 tablet 0       ALLERGIES     Allergies   Allergen Reactions     No Known Allergies        PAST MEDICAL HISTORY:  Past  Medical History:   Diagnosis Date     Acute gouty arthritis      Aortic root dilatation (H)      Ascending aorta dilatation (H)      Chest pain      Coronary artery disease      GIB (gastrointestinal bleeding)      Hyperlipidaemia      NSTEMI (non-ST elevated myocardial infarction) (H) 11/24/2020     NSTEMI (non-ST elevated myocardial infarction) (H) 11/24/2020     Osteoarthritis     s/p right IRENE 11/26/19 at [facility]     S/P CABG x 5 2013    LIMA-LAD, SVG-PDA, SVG-PL, SVG-OM1, SVG- OM2 (9/2013)     Shortness of breath      Urinary retention        PAST SURGICAL HISTORY:  Past Surgical History:   Procedure Laterality Date     BYPASS GRAFT ARTERY CORONARY  9/23/2013    Procedure: BYPASS GRAFT ARTERY CORONARY;  CORONARY ARTERY BYPASS GRAFT X 5  WITH ENDOSCOPIC VEIN HARVESTING (LAD, PDA, SIMRAN, OM1 AND OM2 );  Surgeon: Kwadwo Canales MD;  Location:  OR     COLONOSCOPY  10/2013    Shortly after bypass surgery     CORONARY ARTERY BYPASS  2013    LIMA =LAD,svg=PDA,Svg=PLRca, Svg=Om1,Svg=OM2     CV HEART CATHETERIZATION WITH POSSIBLE INTERVENTION N/A 11/24/2020    Procedure: Heart Catheterization with Possible Intervention;  Surgeon: William Collier MD;  Location:  HEART CARDIAC CATH LAB     NO HISTORY OF SURGERY       ORTHOPEDIC SURGERY      cortisone shot in back for pain       FAMILY HISTORY:  Family History   Problem Relation Age of Onset     Lipids Mother      Hypertension Mother      Hyperlipidemia Mother      Cancer Paternal Grandmother      Hypertension Father      Hyperlipidemia Father      C.A.D. No family hx of      Cerebrovascular Disease No family hx of        SOCIAL HISTORY:  Social History     Socioeconomic History     Marital status:      Spouse name: None     Number of children: None     Years of education: None     Highest education level: None   Occupational History     None   Social Needs     Financial resource strain: None     Food insecurity     Worry: None     Inability:  "None     Transportation needs     Medical: None     Non-medical: None   Tobacco Use     Smoking status: Never Smoker     Smokeless tobacco: Never Used   Substance and Sexual Activity     Alcohol use: Not Currently     Alcohol/week: 6.0 standard drinks     Comment: ocassional beer or wine.     Drug use: No     Sexual activity: Yes     Partners: Female     Birth control/protection: None     Comment: Recent occ. of gout in left foot-colcrys & indomethacin   Lifestyle     Physical activity     Days per week: None     Minutes per session: None     Stress: None   Relationships     Social connections     Talks on phone: None     Gets together: None     Attends Buddhist service: None     Active member of club or organization: None     Attends meetings of clubs or organizations: None     Relationship status: None     Intimate partner violence     Fear of current or ex partner: None     Emotionally abused: None     Physically abused: None     Forced sexual activity: None   Other Topics Concern      Service Not Asked     Blood Transfusions Not Asked     Caffeine Concern Yes     Comment: 4 cups coffee daily     Occupational Exposure Not Asked     Hobby Hazards Not Asked     Sleep Concern No     Stress Concern Not Asked     Weight Concern Not Asked     Special Diet Not Asked     Back Care Not Asked     Exercise Yes     Comment: daily walking, biking, golf      Bike Helmet Not Asked     Seat Belt Yes     Self-Exams Not Asked     Parent/sibling w/ CABG, MI or angioplasty before 65F 55M? No   Social History Narrative    , 1 daughter    Employed for [email or web address]       Review of Systems:  Skin:  Negative       Eyes:  Positive for glasses    ENT:  Negative      Respiratory:  Negative       Cardiovascular:  Negative Positive for;chest pain(sharp chest pain occ) CP described as \"burning sensation\" w/slight heaviness, with activity  Gastroenterology: Negative      Genitourinary:  not assessed " nocturia    Musculoskeletal:  Negative joint pain    Neurologic:  Negative      Psychiatric:  Negative      Heme/Lymph/Imm:  Negative      Endocrine:  Negative        Physical Exam:  Vitals: /82   Pulse 58   Wt 78.9 kg (174 lb)   SpO2 100%   BMI 24.27 kg/m      Constitutional:  cooperative;in no acute distress        Skin:  warm and dry to the touch          Head:  normocephalic        Eyes:  conjunctivae and lids unremarkable        Lymph:      ENT:  no pallor or cyanosis        Neck:  JVP normal;no carotid bruit        Respiratory:  clear to auscultation         Cardiac: regular rhythm;no murmurs, gallops or rubs detected                pulses full and equal                                        GI:  abdomen soft;non-tender        Extremities and Muscular Skeletal:  no edema              Neurological:  no gross motor deficits        Psych:  Alert and Oriented x 3        CC  Vanessa Roth PA-C  [address] [place] [address]

## 2021-08-05 ENCOUNTER — OFFICE VISIT (OUTPATIENT)
Dept: CARDIOLOGY | Facility: CLINIC | Age: 66
End: 2021-08-05
Payer: COMMERCIAL

## 2021-08-05 VITALS
HEART RATE: 60 BPM | BODY MASS INDEX: 24.13 KG/M2 | OXYGEN SATURATION: 98 % | SYSTOLIC BLOOD PRESSURE: 128 MMHG | DIASTOLIC BLOOD PRESSURE: 89 MMHG | WEIGHT: 173 LBS

## 2021-08-05 DIAGNOSIS — I25.118 CORONARY ARTERY DISEASE OF NATIVE ARTERY OF NATIVE HEART WITH STABLE ANGINA PECTORIS (H): Primary | ICD-10-CM

## 2021-08-05 DIAGNOSIS — I25.10 CORONARY ARTERY DISEASE INVOLVING NATIVE CORONARY ARTERY OF NATIVE HEART WITHOUT ANGINA PECTORIS: ICD-10-CM

## 2021-08-05 PROCEDURE — 99214 OFFICE O/P EST MOD 30 MIN: CPT | Performed by: INTERNAL MEDICINE

## 2021-08-05 RX ORDER — ISOSORBIDE MONONITRATE 60 MG/1
TABLET, EXTENDED RELEASE ORAL
Qty: 90 TABLET | Refills: 3 | Status: SHIPPED | OUTPATIENT
Start: 2021-08-05 | End: 2021-11-30

## 2021-08-05 RX ORDER — MULTIVIT WITH MINERALS/LUTEIN
1000 TABLET ORAL DAILY
COMMUNITY

## 2021-08-05 RX ORDER — METOPROLOL SUCCINATE 25 MG/1
12.5 TABLET, EXTENDED RELEASE ORAL DAILY
Qty: 45 TABLET | Refills: 2 | Status: SHIPPED | OUTPATIENT
Start: 2021-08-05 | End: 2022-08-04

## 2021-08-05 RX ORDER — ATORVASTATIN CALCIUM 80 MG/1
80 TABLET, FILM COATED ORAL DAILY
Qty: 90 TABLET | Refills: 3 | Status: SHIPPED | OUTPATIENT
Start: 2021-08-05 | End: 2022-09-14

## 2021-08-05 NOTE — PROGRESS NOTES
HPI and Plan:   See dictation    No orders of the defined types were placed in this encounter.      Orders Placed This Encounter   Medications     vitamin C (ASCORBIC ACID) 1000 MG TABS     Sig: Take 1,000 mg by mouth daily     UNKNOWN TO PATIENT     Sig: Turmeric     metoprolol succinate ER (TOPROL-XL) 25 MG 24 hr tablet     Sig: Take 0.5 tablets (12.5 mg) by mouth daily     Dispense:  45 tablet     Refill:  2     isosorbide mononitrate (IMDUR) 60 MG 24 hr tablet     Sig: daily     Dispense:  90 tablet     Refill:  3     atorvastatin (LIPITOR) 80 MG tablet     Sig: Take 1 tablet (80 mg) by mouth daily     Dispense:  90 tablet     Refill:  3       Medications Discontinued During This Encounter   Medication Reason     atorvastatin (LIPITOR) 80 MG tablet Reorder     metoprolol succinate ER (TOPROL-XL) 25 MG 24 hr tablet Reorder     isosorbide mononitrate (IMDUR) 60 MG 24 hr tablet Reorder         Encounter Diagnoses   Name Primary?     Coronary artery disease involving native coronary artery of native heart without angina pectoris      Coronary artery disease of native artery of native heart with stable angina pectoris (H) Yes       CURRENT MEDICATIONS:  Current Outpatient Medications   Medication Sig Dispense Refill     allopurinol (ZYLOPRIM) 100 MG tablet Take 1 tablet (100 mg) by mouth daily 90 tablet 3     aspirin 81 MG tablet Take 81 mg by mouth daily       atorvastatin (LIPITOR) 80 MG tablet Take 1 tablet (80 mg) by mouth daily 90 tablet 3     colchicine (COLCRYS) 0.6 MG tablet Take 2 tabs (1.2mg) by mouth at the first sign of a gout attack, then 1 tab (0.6mg) 1 hour later.  Max 3 tabs (1.8mg) over 1 hour. 30 tablet 3     isosorbide mononitrate (IMDUR) 60 MG 24 hr tablet daily 90 tablet 3     lisinopril (ZESTRIL) 2.5 MG tablet Take 1 tablet (2.5 mg) by mouth daily 90 tablet 3     metoprolol succinate ER (TOPROL-XL) 25 MG 24 hr tablet Take 0.5 tablets (12.5 mg) by mouth daily 45 tablet 2     NONFORMULARY Take by  mouth daily Collagen 1 scoop       UNKNOWN TO PATIENT Turmeric       vitamin C (ASCORBIC ACID) 1000 MG TABS Take 1,000 mg by mouth daily       Vitamin D, Cholecalciferol, 1000 UNITS TABS Take 1,000 Units by mouth daily        Multiple Vitamin (MULTI VITAMIN DAILY PO) Take 1 tablet by mouth daily  (Patient not taking: Reported on 8/5/2021)       nitroGLYcerin (NITROSTAT) 0.4 MG sublingual tablet For chest pain place 1 tablet under the tongue every 5 minutes for 3 doses. If symptoms persist 5 minutes after 1st dose call 911. (Patient not taking: Reported on 12/17/2020) 30 tablet 0       ALLERGIES     Allergies   Allergen Reactions     No Known Allergies        PAST MEDICAL HISTORY:  Past Medical History:   Diagnosis Date     Acute gouty arthritis      Aortic root dilatation (H)      Ascending aorta dilatation (H)      Chest pain      Coronary artery disease      Hyperlipidaemia      Lower GI bleed 09/30/2013     NSTEMI (non-ST elevated myocardial infarction) (H) 11/24/2020     NSTEMI (non-ST elevated myocardial infarction) (H) 11/24/2020     Osteoarthritis     s/p right IRENE 11/26/19 at [facility] [place] [place]     S/P CABG x 5 2013    LIMA-LAD, SVG-PDA, SVG-PL, SVG-OM1, SVG- OM2 (9/2013)     Shortness of breath      Urinary retention        PAST SURGICAL HISTORY:  Past Surgical History:   Procedure Laterality Date     BYPASS GRAFT ARTERY CORONARY  9/23/2013    Procedure: BYPASS GRAFT ARTERY CORONARY;  CORONARY ARTERY BYPASS GRAFT X 5  WITH ENDOSCOPIC VEIN HARVESTING (LAD, PDA, SIMRAN, OM1 AND OM2 );  Surgeon: Kwadwo Canales MD;  Location:  OR     COLONOSCOPY  10/2013    Shortly after bypass surgery     CORONARY ARTERY BYPASS  2013    LIMA =LAD,svg=PDA,Svg=PLRca, Svg=Om1,Svg=OM2     CV HEART CATHETERIZATION WITH POSSIBLE INTERVENTION N/A 11/24/2020    Procedure: Heart Catheterization with Possible Intervention;  Surgeon: William Collier MD;  Location:  HEART CARDIAC CATH LAB     NO HISTORY OF SURGERY       ORTHOPEDIC  SURGERY      cortisone shot in back for pain       FAMILY HISTORY:  Family History   Problem Relation Age of Onset     Lipids Mother      Hypertension Mother      Hyperlipidemia Mother      Cancer Paternal Grandmother      Hypertension Father      Hyperlipidemia Father      C.A.D. No family hx of      Cerebrovascular Disease No family hx of        SOCIAL HISTORY:  Social History     Socioeconomic History     Marital status:      Spouse name: None     Number of children: None     Years of education: None     Highest education level: None   Occupational History     None   Tobacco Use     Smoking status: Never Smoker     Smokeless tobacco: Never Used   Substance and Sexual Activity     Alcohol use: Not Currently     Alcohol/week: 6.0 standard drinks     Comment: ocassional beer or wine.     Drug use: No     Sexual activity: Yes     Partners: Female     Birth control/protection: None     Comment: Recent occ. of gout in left foot-colcrys & indomethacin   Other Topics Concern      Service Not Asked     Blood Transfusions Not Asked     Caffeine Concern Yes     Comment: 4 cups coffee daily     Occupational Exposure Not Asked     Hobby Hazards Not Asked     Sleep Concern No     Stress Concern Not Asked     Weight Concern Not Asked     Special Diet Not Asked     Back Care Not Asked     Exercise Yes     Comment: daily walking, biking, golf      Bike Helmet Not Asked     Seat Belt Yes     Self-Exams Not Asked     Parent/sibling w/ CABG, MI or angioplasty before 65F 55M? No   Social History Narrative    , 1 daughter    Employed for [employer]     Social Determinants of Health     Financial Resource Strain:      Difficulty of Paying Living Expenses:    Food Insecurity:      Worried About Running Out of Food in the Last Year:      Ran Out of Food in the Last Year:    Transportation Needs:      Lack of Transportation (Medical):      Lack of Transportation (Non-Medical):    Physical  Activity:      Days of Exercise per Week:      Minutes of Exercise per Session:    Stress:      Feeling of Stress :    Social Connections:      Frequency of Communication with Friends and Family:      Frequency of Social Gatherings with Friends and Family:      Attends Ep[employer] Services:      Active Member of Clubs or Organizations:      Attends Club or Organization Meetings:      Marital Status:    Intimate Partner Violence:      Fear of Current or Ex-Partner:      Emotionally Abused:      Physically Abused:      Sexually Abused:        Review of Systems:  Skin:  Negative       Eyes:  Positive for glasses    ENT:  Negative      Respiratory:  Negative       Cardiovascular:  Negative Positive for;chest pain (sharp chest pain occ) still has angina at beginning of walk, and happens always with exertion  Gastroenterology: Negative      Genitourinary:  not assessed nocturia    Musculoskeletal:  Negative joint pain    Neurologic:  Negative      Psychiatric:  Negative      Heme/Lymph/Imm:  Negative      Endocrine:  Negative        Physical Exam:  Vitals: /89   Pulse 60   Wt 78.5 kg (173 lb)   SpO2 98%   BMI 24.13 kg/m      Constitutional:  cooperative;in no acute distress        Skin:  warm and dry to the touch          Head:  normocephalic        Eyes:  conjunctivae and lids unremarkable        Lymph:      ENT:  no pallor or cyanosis        Neck:  JVP normal;no carotid bruit        Respiratory:  clear to auscultation         Cardiac: regular rhythm;no murmurs, gallops or rubs detected                pulses full and equal                                        GI:  abdomen soft;non-tender        Extremities and Muscular Skeletal:  no edema              Neurological:  no gross motor deficits        Psych:  Alert and Oriented x 3        CC  No referring provider defined for this encounter.

## 2021-08-05 NOTE — LETTER
8/5/2021    Mikie Lazaro MD, MD  [address]  [facility] [address]    RE: Odessa Elliott       Dear Colleague,    I had the pleasure of seeing Odessa Elliott in the [facility].    HPI and Plan:   See dictation    No orders of the defined types were placed in this encounter.      Orders Placed This Encounter   Medications     vitamin C (ASCORBIC ACID) 1000 MG TABS     Sig: Take 1,000 mg by mouth daily     UNKNOWN TO PATIENT     Sig: Turmeric     metoprolol succinate ER (TOPROL-XL) 25 MG 24 hr tablet     Sig: Take 0.5 tablets (12.5 mg) by mouth daily     Dispense:  45 tablet     Refill:  2     isosorbide mononitrate (IMDUR) 60 MG 24 hr tablet     Sig: daily     Dispense:  90 tablet     Refill:  3     atorvastatin (LIPITOR) 80 MG tablet     Sig: Take 1 tablet (80 mg) by mouth daily     Dispense:  90 tablet     Refill:  3       Medications Discontinued During This Encounter   Medication Reason     atorvastatin (LIPITOR) 80 MG tablet Reorder     metoprolol succinate ER (TOPROL-XL) 25 MG 24 hr tablet Reorder     isosorbide mononitrate (IMDUR) 60 MG 24 hr tablet Reorder         Encounter Diagnoses   Name Primary?     Coronary artery disease involving native coronary artery of native heart without angina pectoris      Coronary artery disease of native artery of native heart with stable angina pectoris (H) Yes       CURRENT MEDICATIONS:  Current Outpatient Medications   Medication Sig Dispense Refill     allopurinol (ZYLOPRIM) 100 MG tablet Take 1 tablet (100 mg) by mouth daily 90 tablet 3     aspirin 81 MG tablet Take 81 mg by mouth daily       atorvastatin (LIPITOR) 80 MG tablet Take 1 tablet (80 mg) by mouth daily 90 tablet 3     colchicine (COLCRYS) 0.6 MG tablet Take 2 tabs (1.2mg) by mouth at the first sign of a gout attack, then 1 tab (0.6mg) 1 hour later.  Max 3 tabs (1.8mg) over 1 hour. 30 tablet 3     isosorbide mononitrate (IMDUR) 60 MG 24 hr  tablet daily 90 tablet 3     lisinopril (ZESTRIL) 2.5 MG tablet Take 1 tablet (2.5 mg) by mouth daily 90 tablet 3     metoprolol succinate ER (TOPROL-XL) 25 MG 24 hr tablet Take 0.5 tablets (12.5 mg) by mouth daily 45 tablet 2     NONFORMULARY Take by mouth daily Collagen 1 scoop       UNKNOWN TO PATIENT Turmeric       vitamin C (ASCORBIC ACID) 1000 MG TABS Take 1,000 mg by mouth daily       Vitamin D, Cholecalciferol, 1000 UNITS TABS Take 1,000 Units by mouth daily        Multiple Vitamin (MULTI VITAMIN DAILY PO) Take 1 tablet by mouth daily  (Patient not taking: Reported on 8/5/2021)       nitroGLYcerin (NITROSTAT) 0.4 MG sublingual tablet For chest pain place 1 tablet under the tongue every 5 minutes for 3 doses. If symptoms persist 5 minutes after 1st dose call 911. (Patient not taking: Reported on 12/17/2020) 30 tablet 0       ALLERGIES     Allergies   Allergen Reactions     No Known Allergies        PAST MEDICAL HISTORY:  Past Medical History:   Diagnosis Date     Acute gouty arthritis      Aortic root dilatation (H)      Ascending aorta dilatation (H)      Chest pain      Coronary artery disease      Hyperlipidaemia      Lower GI bleed 09/30/2013     NSTEMI (non-ST elevated myocardial infarction) (H) 11/24/2020     NSTEMI (non-ST elevated myocardial infarction) (H) 11/24/2020     Osteoarthritis     s/p right IRENE 11/26/19 at [facility]     S/P CABG x 5 2013    LIMA-LAD, SVG-PDA, SVG-PL, SVG-OM1, SVG- OM2 (9/2013)     Shortness of breath      Urinary retention        PAST SURGICAL HISTORY:  Past Surgical History:   Procedure Laterality Date     BYPASS GRAFT ARTERY CORONARY  9/23/2013    Procedure: BYPASS GRAFT ARTERY CORONARY;  CORONARY ARTERY BYPASS GRAFT X 5  WITH ENDOSCOPIC VEIN HARVESTING (LAD, PDA, SIMRAN, OM1 AND OM2 );  Surgeon: Kwadwo Canales MD;  Location: [facility] OR     COLONOSCOPY  10/2013    Shortly after bypass surgery     CORONARY ARTERY BYPASS  2013    LIMA =LAD,svg=PDA,Svg=PLRca, Svg=Om1,Svg=OM2      CV HEART CATHETERIZATION WITH POSSIBLE INTERVENTION N/A 11/24/2020    Procedure: Heart Catheterization with Possible Intervention;  Surgeon: William Collier MD;  Location:  HEART CARDIAC CATH LAB     NO HISTORY OF SURGERY       ORTHOPEDIC SURGERY      cortisone shot in back for pain       FAMILY HISTORY:  Family History   Problem Relation Age of Onset     Lipids Mother      Hypertension Mother      Hyperlipidemia Mother      Cancer Paternal Grandmother      Hypertension Father      Hyperlipidemia Father      C.A.D. No family hx of      Cerebrovascular Disease No family hx of        SOCIAL HISTORY:  Social History     Socioeconomic History     Marital status:      Spouse name: None     Number of children: None     Years of education: None     Highest education level: None   Occupational History     None   Tobacco Use     Smoking status: Never Smoker     Smokeless tobacco: Never Used   Substance and Sexual Activity     Alcohol use: Not Currently     Alcohol/week: 6.0 standard drinks     Comment: ocassional beer or wine.     Drug use: No     Sexual activity: Yes     Partners: Female     Birth control/protection: None     Comment: Recent occ. of gout in left foot-colcrys & indomethacin   Other Topics Concern      Service Not Asked     Blood Transfusions Not Asked     Caffeine Concern Yes     Comment: 4 cups coffee daily     Occupational Exposure Not Asked     Hobby Hazards Not Asked     Sleep Concern No     Stress Concern Not Asked     Weight Concern Not Asked     Special Diet Not Asked     Back Care Not Asked     Exercise Yes     Comment: daily walking, biking, golf      Bike Helmet Not Asked     Seat Belt Yes     Self-Exams Not Asked     Parent/sibling w/ CABG, MI or angioplasty before 65F 55M? No   Social History Narrative    , 1 daughter    Employed for [email or web address]     Social Determinants of Health     Financial Resource Strain:      Difficulty of Paying  Living Expenses:    Food Insecurity:      Worried About Running Out of Food in the Last Year:      Ran Out of Food in the Last Year:    Transportation Needs:      Lack of Transportation (Medical):      Lack of Transportation (Non-Medical):    Physical Activity:      Days of Exercise per Week:      Minutes of Exercise per Session:    Stress:      Feeling of Stress :    Social Connections:      Frequency of Communication with Friends and Family:      Frequency of Social Gatherings with Friends and Family:      Attends Jehovah[employer] witness Services:      Active Member of Clubs or Organizations:      Attends Club or Organization Meetings:      Marital Status:    Intimate Partner Violence:      Fear of Current or Ex-Partner:      Emotionally Abused:      Physically Abused:      Sexually Abused:        Review of Systems:  Skin:  Negative       Eyes:  Positive for glasses    ENT:  Negative      Respiratory:  Negative       Cardiovascular:  Negative Positive for;chest pain (sharp chest pain occ) still has angina at beginning of walk, and happens always with exertion  Gastroenterology: Negative      Genitourinary:  not assessed nocturia    Musculoskeletal:  Negative joint pain    Neurologic:  Negative      Psychiatric:  Negative      Heme/Lymph/Imm:  Negative      Endocrine:  Negative        Physical Exam:  Vitals: /89   Pulse 60   Wt 78.5 kg (173 lb)   SpO2 98%   BMI 24.13 kg/m      Constitutional:  cooperative;in no acute distress        Skin:  warm and dry to the touch          Head:  normocephalic        Eyes:  conjunctivae and lids unremarkable        Lymph:      ENT:  no pallor or cyanosis        Neck:  JVP normal;no carotid bruit        Respiratory:  clear to auscultation         Cardiac: regular rhythm;no murmurs, gallops or rubs detected                pulses full and equal                                        GI:  abdomen soft;non-tender        Extremities and Muscular Skeletal:  no edema               Neurological:  no gross motor deficits        Psych:  Alert and Oriented x 3        CC  No referring provider defined for this encounter.                  Thank you for allowing me to participate in the care of your patient.      Sincerely,     Armani Perez MD, MD     [facility]  cc:   No referring provider defined for this encounter.

## 2021-08-05 NOTE — PROGRESS NOTES
Service Date: 08/05/2021    REASON FOR CONSULTATION:  Followup for coronary artery disease and chronic stable angina.    HISTORY OF PRESENT ILLNESS:  I had the pleasure of seeing Odessa Elliott at [facility] in [place] this afternoon.  He is a very pleasant, 66-year-old gentleman who is well known to me.  He has known coronary artery disease and hyperlipidemia.  In 2013, we evaluated him for unstable angina.  Coronary angiogram at that time revealed severe 3 vessel coronary artery disease.  He subsequently underwent coronary artery bypass grafting surgery x5 with a LIMA to LAD, vein graft to the right posterior descending artery, vein graft to the right posterolateral branch, vein graft to the obtuse marginal 1 and vein graft to the obtuse marginal branch 2.  He did well the years following his surgery.    Last October, he started noticing exertional chest burning.  The episodes were initially brought on by strenuous activity, but subsequently came on with his usual walks.  His wife who is a nurse was not aware of his symptoms.  When he mentioned those symptoms, she brought him to the emergency department.  This was in November of last year.  His troponin was mildly elevated without significant delta.  He then proceeded to have coronary and graft angiography, which I have personally reviewed.  The angiogram revealed severe native vessel coronary artery disease as expected.  His LIMA to the LAD was widely patent.  His SVG to RCA was also widely patent.  The SVG to the right posterolateral branch was patent, although the graft was small.  The vein graft to the first obtuse marginal branch was patent, but had moderate disease proximally, and the SVG to the second obtuse marginal branch was patent.  His RCA was chronically occluded.  His left main had moderate to severe disease, and the proximal LAD, which supplied a moderate-sized diagonal branch, had 95% calcific stenosis.    After his  angiogram, the patient was commenced on medical therapy.  He was initially on Imdur 30 mg daily with improved symptoms.  Subsequently, I saw him in February.  At that time he was still complaining of chest pain with exertion.  I then increased his Imdur to 60 mg daily.  Since then, his angina has improved, but he continues to endorse chest pressure with activity, such as riding his bicycle or walking up the hill.  He sometimes can push through the pain with his exercise.  No rest pain.  No palpitations, presyncope or syncope.    He had an echocardiogram last November, which I reviewed.  That echo showed preserved LV function with an EF of 60%-65% and a dilated ascending aorta measuring 4.4 cm.    IMPRESSION AND PLAN:    1.  Chronic stable angina.  2.  Coronary artery disease, status post prior CABG x5 in 2013.  3.  Hyperlipidemia.    He continues to experience exertional chest discomfort.  Although the symptoms are stable, they are still quite concerning for him.     I again reviewed his angiogram.  I told him that it is reasonable to escalate his medical therapy.  If the symptoms persist despite that, then consider a repeat angiogram and possible revascularization of the proximal LAD into the diagonal to relieve some of his symptoms.  We will also closely evaluate the vein graft to the first obtuse marginal branch to make sure the proximal stenosis is not significant.    We will give the medications a try for a few weeks.  We will schedule him tentatively for an angiogram in early September.  I told him that he can cancel the angiogram if his symptoms completely resolve with the increased Imdur dose.    I appreciate the opportunity to participate in his care.    Armani Perez MD        D: 2021   T: 2021   MT: ines    Name:     AVINASH THOMAS  MRN:      [id number]        Account:      [id number]   :      1955           Service Date: 2021       Document: [id number]

## 2021-08-08 DIAGNOSIS — Z11.59 ENCOUNTER FOR SCREENING FOR OTHER VIRAL DISEASES: ICD-10-CM

## 2021-08-09 DIAGNOSIS — I25.10 CORONARY ARTERY DISEASE INVOLVING NATIVE CORONARY ARTERY OF NATIVE HEART WITHOUT ANGINA PECTORIS: Primary | ICD-10-CM

## 2021-09-15 ENCOUNTER — TELEPHONE (OUTPATIENT)
Dept: CARDIOLOGY | Facility: CLINIC | Age: 66
End: 2021-09-15

## 2021-09-15 NOTE — TELEPHONE ENCOUNTER
Telephoned patient to assess his symptoms after initiating Imdur. Patient is schedule for coronary angiogram on Tuesday 9/21/21. Per Dr. Perez, angiogram may be cancelled if patient's symptoms have resolved.  Left message requesting call back.    Raisa Baeza RN  [facility]

## 2021-09-17 DIAGNOSIS — I25.10 CORONARY ARTERY DISEASE INVOLVING NATIVE CORONARY ARTERY OF NATIVE HEART WITHOUT ANGINA PECTORIS: Primary | ICD-10-CM

## 2021-09-17 DIAGNOSIS — I21.4 NSTEMI (NON-ST ELEVATED MYOCARDIAL INFARCTION) (H): ICD-10-CM

## 2021-09-17 RX ORDER — POTASSIUM CHLORIDE 1500 MG/1
20 TABLET, EXTENDED RELEASE ORAL
Status: CANCELLED | OUTPATIENT
Start: 2021-09-17

## 2021-09-17 RX ORDER — ASPIRIN 325 MG
325 TABLET ORAL ONCE
Status: CANCELLED | OUTPATIENT
Start: 2021-09-17 | End: 2021-09-17

## 2021-09-17 RX ORDER — ASPIRIN 81 MG/1
243 TABLET, CHEWABLE ORAL ONCE
Status: CANCELLED | OUTPATIENT
Start: 2021-09-17

## 2021-09-17 RX ORDER — SODIUM CHLORIDE 9 MG/ML
INJECTION, SOLUTION INTRAVENOUS CONTINUOUS
Status: CANCELLED | OUTPATIENT
Start: 2021-09-17

## 2021-09-17 RX ORDER — LIDOCAINE 40 MG/G
CREAM TOPICAL
Status: CANCELLED | OUTPATIENT
Start: 2021-09-17

## 2021-09-19 ENCOUNTER — HEALTH MAINTENANCE LETTER (OUTPATIENT)
Age: 66
End: 2021-09-19

## 2021-09-19 ENCOUNTER — LAB (OUTPATIENT)
Dept: URGENT CARE | Facility: URGENT CARE | Age: 66
End: 2021-09-19
Attending: INTERNAL MEDICINE
Payer: COMMERCIAL

## 2021-09-19 DIAGNOSIS — Z11.59 ENCOUNTER FOR SCREENING FOR OTHER VIRAL DISEASES: ICD-10-CM

## 2021-09-19 PROCEDURE — U0003 INFECTIOUS AGENT DETECTION BY NUCLEIC ACID (DNA OR RNA); SEVERE ACUTE RESPIRATORY SYNDROME CORONAVIRUS 2 (SARS-COV-2) (CORONAVIRUS DISEASE [COVID-19]), AMPLIFIED PROBE TECHNIQUE, MAKING USE OF HIGH THROUGHPUT TECHNOLOGIES AS DESCRIBED BY CMS-2020-01-R: HCPCS

## 2021-09-19 PROCEDURE — U0005 INFEC AGEN DETEC AMPLI PROBE: HCPCS

## 2021-09-20 LAB — SARS-COV-2 RNA RESP QL NAA+PROBE: NEGATIVE

## 2021-09-21 ENCOUNTER — HOSPITAL ENCOUNTER (OUTPATIENT)
Facility: CLINIC | Age: 66
Discharge: HOME | End: 2021-09-21
Admitting: INTERNAL MEDICINE
Payer: COMMERCIAL

## 2021-09-21 VITALS
WEIGHT: 172 LBS | HEART RATE: 55 BPM | TEMPERATURE: 97 F | SYSTOLIC BLOOD PRESSURE: 101 MMHG | HEIGHT: 71 IN | BODY MASS INDEX: 24.08 KG/M2 | DIASTOLIC BLOOD PRESSURE: 72 MMHG | RESPIRATION RATE: 16 BRPM | OXYGEN SATURATION: 99 %

## 2021-09-21 DIAGNOSIS — I25.118 CORONARY ARTERY DISEASE OF NATIVE ARTERY OF NATIVE HEART WITH STABLE ANGINA PECTORIS (H): ICD-10-CM

## 2021-09-21 DIAGNOSIS — Z98.890 STATUS POST CORONARY ANGIOGRAM: Primary | ICD-10-CM

## 2021-09-21 DIAGNOSIS — I25.10 CORONARY ARTERY DISEASE INVOLVING NATIVE CORONARY ARTERY OF NATIVE HEART WITHOUT ANGINA PECTORIS: ICD-10-CM

## 2021-09-21 DIAGNOSIS — I21.4 NSTEMI (NON-ST ELEVATED MYOCARDIAL INFARCTION) (H): ICD-10-CM

## 2021-09-21 LAB
ANION GAP SERPL CALCULATED.3IONS-SCNC: 3 MMOL/L (ref 3–14)
APTT PPP: 31 SECONDS (ref 22–38)
BUN SERPL-MCNC: 11 MG/DL (ref 7–30)
CALCIUM SERPL-MCNC: 8.8 MG/DL (ref 8.5–10.1)
CHLORIDE BLD-SCNC: 107 MMOL/L (ref 94–109)
CO2 SERPL-SCNC: 29 MMOL/L (ref 20–32)
CREAT SERPL-MCNC: 0.97 MG/DL (ref 0.66–1.25)
ERYTHROCYTE [DISTWIDTH] IN BLOOD BY AUTOMATED COUNT: 13.2 % (ref 10–15)
GFR SERPL CREATININE-BSD FRML MDRD: 80 ML/MIN/1.73M2
GLUCOSE BLD-MCNC: 90 MG/DL (ref 70–99)
HCT VFR BLD AUTO: 43.1 % (ref 40–53)
HGB BLD-MCNC: 14.6 G/DL (ref 13.3–17.7)
INR PPP: 1.07 (ref 0.85–1.15)
MCH RBC QN AUTO: 31.1 PG (ref 26.5–33)
MCHC RBC AUTO-ENTMCNC: 33.9 G/DL (ref 31.5–36.5)
MCV RBC AUTO: 92 FL (ref 78–100)
PLATELET # BLD AUTO: 203 10E3/UL (ref 150–450)
POTASSIUM BLD-SCNC: 4.3 MMOL/L (ref 3.4–5.3)
RBC # BLD AUTO: 4.7 10E6/UL (ref 4.4–5.9)
SODIUM SERPL-SCNC: 139 MMOL/L (ref 133–144)
WBC # BLD AUTO: 3.8 10E3/UL (ref 4–11)

## 2021-09-21 PROCEDURE — 99153 MOD SED SAME PHYS/QHP EA: CPT | Performed by: INTERNAL MEDICINE

## 2021-09-21 PROCEDURE — C1725 CATH, TRANSLUMIN NON-LASER: HCPCS | Performed by: INTERNAL MEDICINE

## 2021-09-21 PROCEDURE — 999N000184 HC STATISTIC TELEMETRY

## 2021-09-21 PROCEDURE — 85730 THROMBOPLASTIN TIME PARTIAL: CPT | Performed by: INTERNAL MEDICINE

## 2021-09-21 PROCEDURE — 92920 PRQ TRLUML C ANGIOP 1ART&/BR: CPT | Mod: LD | Performed by: INTERNAL MEDICINE

## 2021-09-21 PROCEDURE — 85610 PROTHROMBIN TIME: CPT | Performed by: INTERNAL MEDICINE

## 2021-09-21 PROCEDURE — 250N000013 HC RX MED GY IP 250 OP 250 PS 637: Performed by: INTERNAL MEDICINE

## 2021-09-21 PROCEDURE — 85027 COMPLETE CBC AUTOMATED: CPT | Performed by: INTERNAL MEDICINE

## 2021-09-21 PROCEDURE — 258N000003 HC RX IP 258 OP 636: Performed by: INTERNAL MEDICINE

## 2021-09-21 PROCEDURE — 250N000011 HC RX IP 250 OP 636: Performed by: INTERNAL MEDICINE

## 2021-09-21 PROCEDURE — 999N000054 HC STATISTIC EKG NON-CHARGEABLE

## 2021-09-21 PROCEDURE — C1887 CATHETER, GUIDING: HCPCS | Performed by: INTERNAL MEDICINE

## 2021-09-21 PROCEDURE — 93005 ELECTROCARDIOGRAM TRACING: CPT

## 2021-09-21 PROCEDURE — 80048 BASIC METABOLIC PNL TOTAL CA: CPT | Performed by: INTERNAL MEDICINE

## 2021-09-21 PROCEDURE — C1894 INTRO/SHEATH, NON-LASER: HCPCS | Performed by: INTERNAL MEDICINE

## 2021-09-21 PROCEDURE — 93455 CORONARY ART/GRFT ANGIO S&I: CPT | Mod: XU | Performed by: INTERNAL MEDICINE

## 2021-09-21 PROCEDURE — 250N000009 HC RX 250: Performed by: INTERNAL MEDICINE

## 2021-09-21 PROCEDURE — 99152 MOD SED SAME PHYS/QHP 5/>YRS: CPT | Mod: GC | Performed by: INTERNAL MEDICINE

## 2021-09-21 PROCEDURE — 99152 MOD SED SAME PHYS/QHP 5/>YRS: CPT | Performed by: INTERNAL MEDICINE

## 2021-09-21 PROCEDURE — 93455 CORONARY ART/GRFT ANGIO S&I: CPT | Mod: 26 | Performed by: INTERNAL MEDICINE

## 2021-09-21 PROCEDURE — 85347 COAGULATION TIME ACTIVATED: CPT

## 2021-09-21 PROCEDURE — 36415 COLL VENOUS BLD VENIPUNCTURE: CPT | Performed by: INTERNAL MEDICINE

## 2021-09-21 PROCEDURE — C1760 CLOSURE DEV, VASC: HCPCS | Performed by: INTERNAL MEDICINE

## 2021-09-21 PROCEDURE — C1769 GUIDE WIRE: HCPCS | Performed by: INTERNAL MEDICINE

## 2021-09-21 PROCEDURE — 272N000001 HC OR GENERAL SUPPLY STERILE: Performed by: INTERNAL MEDICINE

## 2021-09-21 PROCEDURE — 999N000071 HC STATISTIC HEART CATH LAB OR EP LAB

## 2021-09-21 RX ORDER — OXYCODONE HYDROCHLORIDE 5 MG/1
5 TABLET ORAL EVERY 4 HOURS PRN
Status: DISCONTINUED | OUTPATIENT
Start: 2021-09-21 | End: 2021-09-21 | Stop reason: HOSPADM

## 2021-09-21 RX ORDER — LIDOCAINE 40 MG/G
CREAM TOPICAL
Status: DISCONTINUED | OUTPATIENT
Start: 2021-09-21 | End: 2021-09-21 | Stop reason: HOSPADM

## 2021-09-21 RX ORDER — NALOXONE HYDROCHLORIDE 0.4 MG/ML
0.4 INJECTION, SOLUTION INTRAMUSCULAR; INTRAVENOUS; SUBCUTANEOUS
Status: DISCONTINUED | OUTPATIENT
Start: 2021-09-21 | End: 2021-09-21 | Stop reason: HOSPADM

## 2021-09-21 RX ORDER — HYDRALAZINE HYDROCHLORIDE 20 MG/ML
10 INJECTION INTRAMUSCULAR; INTRAVENOUS EVERY 4 HOURS PRN
Status: DISCONTINUED | OUTPATIENT
Start: 2021-09-21 | End: 2021-09-21 | Stop reason: HOSPADM

## 2021-09-21 RX ORDER — FENTANYL CITRATE 50 UG/ML
25 INJECTION, SOLUTION INTRAMUSCULAR; INTRAVENOUS
Status: DISCONTINUED | OUTPATIENT
Start: 2021-09-21 | End: 2021-09-21 | Stop reason: HOSPADM

## 2021-09-21 RX ORDER — ARGATROBAN 1 MG/ML
150 INJECTION, SOLUTION INTRAVENOUS
Status: DISCONTINUED | OUTPATIENT
Start: 2021-09-21 | End: 2021-09-21 | Stop reason: HOSPADM

## 2021-09-21 RX ORDER — ACETAMINOPHEN 325 MG/1
650 TABLET ORAL EVERY 4 HOURS PRN
Status: DISCONTINUED | OUTPATIENT
Start: 2021-09-21 | End: 2021-09-21 | Stop reason: HOSPADM

## 2021-09-21 RX ORDER — ASPIRIN 81 MG/1
243 TABLET, CHEWABLE ORAL ONCE
Status: DISCONTINUED | OUTPATIENT
Start: 2021-09-21 | End: 2021-09-21 | Stop reason: HOSPADM

## 2021-09-21 RX ORDER — POTASSIUM CHLORIDE 1500 MG/1
20 TABLET, EXTENDED RELEASE ORAL
Status: DISCONTINUED | OUTPATIENT
Start: 2021-09-21 | End: 2021-09-21 | Stop reason: HOSPADM

## 2021-09-21 RX ORDER — IOPAMIDOL 755 MG/ML
INJECTION, SOLUTION INTRAVASCULAR
Status: DISCONTINUED | OUTPATIENT
Start: 2021-09-21 | End: 2021-09-21 | Stop reason: HOSPADM

## 2021-09-21 RX ORDER — METOPROLOL TARTRATE 1 MG/ML
5-10 INJECTION, SOLUTION INTRAVENOUS
Status: DISCONTINUED | OUTPATIENT
Start: 2021-09-21 | End: 2021-09-21 | Stop reason: HOSPADM

## 2021-09-21 RX ORDER — FENTANYL CITRATE-0.9 % NACL/PF 10 MCG/ML
PLASTIC BAG, INJECTION (ML) INTRAVENOUS
Status: DISCONTINUED | OUTPATIENT
Start: 2021-09-21 | End: 2021-09-21 | Stop reason: HOSPADM

## 2021-09-21 RX ORDER — ARGATROBAN 1 MG/ML
350 INJECTION, SOLUTION INTRAVENOUS
Status: DISCONTINUED | OUTPATIENT
Start: 2021-09-21 | End: 2021-09-21 | Stop reason: HOSPADM

## 2021-09-21 RX ORDER — HEPARIN SODIUM 1000 [USP'U]/ML
INJECTION, SOLUTION INTRAVENOUS; SUBCUTANEOUS
Status: DISCONTINUED | OUTPATIENT
Start: 2021-09-21 | End: 2021-09-21 | Stop reason: HOSPADM

## 2021-09-21 RX ORDER — SODIUM CHLORIDE 9 MG/ML
INJECTION, SOLUTION INTRAVENOUS CONTINUOUS
Status: DISCONTINUED | OUTPATIENT
Start: 2021-09-21 | End: 2021-09-21 | Stop reason: HOSPADM

## 2021-09-21 RX ORDER — ATROPINE SULFATE 0.1 MG/ML
0.5 INJECTION INTRAVENOUS
Status: DISCONTINUED | OUTPATIENT
Start: 2021-09-21 | End: 2021-09-21 | Stop reason: HOSPADM

## 2021-09-21 RX ORDER — ONDANSETRON 4 MG/1
4 TABLET, ORALLY DISINTEGRATING ORAL EVERY 6 HOURS PRN
Status: DISCONTINUED | OUTPATIENT
Start: 2021-09-21 | End: 2021-09-21 | Stop reason: HOSPADM

## 2021-09-21 RX ORDER — DOBUTAMINE HYDROCHLORIDE 200 MG/100ML
2-20 INJECTION INTRAVENOUS CONTINUOUS PRN
Status: DISCONTINUED | OUTPATIENT
Start: 2021-09-21 | End: 2021-09-21 | Stop reason: HOSPADM

## 2021-09-21 RX ORDER — NALOXONE HYDROCHLORIDE 0.4 MG/ML
0.2 INJECTION, SOLUTION INTRAMUSCULAR; INTRAVENOUS; SUBCUTANEOUS
Status: DISCONTINUED | OUTPATIENT
Start: 2021-09-21 | End: 2021-09-21 | Stop reason: HOSPADM

## 2021-09-21 RX ORDER — SODIUM CHLORIDE 9 MG/ML
INJECTION, SOLUTION INTRAVENOUS CONTINUOUS
Status: ACTIVE | OUTPATIENT
Start: 2021-09-21 | End: 2021-09-21

## 2021-09-21 RX ORDER — NITROGLYCERIN 0.4 MG/1
0.4 TABLET SUBLINGUAL EVERY 5 MIN PRN
Status: DISCONTINUED | OUTPATIENT
Start: 2021-09-21 | End: 2021-09-21 | Stop reason: HOSPADM

## 2021-09-21 RX ORDER — OXYCODONE HYDROCHLORIDE 5 MG/1
10 TABLET ORAL EVERY 4 HOURS PRN
Status: DISCONTINUED | OUTPATIENT
Start: 2021-09-21 | End: 2021-09-21 | Stop reason: HOSPADM

## 2021-09-21 RX ORDER — NITROGLYCERIN 5 MG/ML
VIAL (ML) INTRAVENOUS
Status: DISCONTINUED | OUTPATIENT
Start: 2021-09-21 | End: 2021-09-21 | Stop reason: HOSPADM

## 2021-09-21 RX ORDER — EPTIFIBATIDE 2 MG/ML
180 INJECTION, SOLUTION INTRAVENOUS EVERY 10 MIN PRN
Status: DISCONTINUED | OUTPATIENT
Start: 2021-09-21 | End: 2021-09-21 | Stop reason: HOSPADM

## 2021-09-21 RX ORDER — ASPIRIN 81 MG/1
81 TABLET ORAL DAILY
Status: DISCONTINUED | OUTPATIENT
Start: 2021-09-22 | End: 2021-09-21 | Stop reason: HOSPADM

## 2021-09-21 RX ORDER — SODIUM CHLORIDE 9 MG/ML
INJECTION, SOLUTION INTRAVENOUS ONCE
Status: DISCONTINUED | OUTPATIENT
Start: 2021-09-21 | End: 2021-09-21 | Stop reason: HOSPADM

## 2021-09-21 RX ORDER — CLOPIDOGREL BISULFATE 75 MG/1
75 TABLET ORAL DAILY
Qty: 30 TABLET | Refills: 0 | Status: SHIPPED | OUTPATIENT
Start: 2021-09-21 | End: 2022-01-21

## 2021-09-21 RX ORDER — FENTANYL CITRATE 50 UG/ML
INJECTION, SOLUTION INTRAMUSCULAR; INTRAVENOUS
Status: DISCONTINUED | OUTPATIENT
Start: 2021-09-21 | End: 2021-09-21 | Stop reason: HOSPADM

## 2021-09-21 RX ORDER — DOPAMINE HYDROCHLORIDE 160 MG/100ML
2-20 INJECTION, SOLUTION INTRAVENOUS CONTINUOUS PRN
Status: DISCONTINUED | OUTPATIENT
Start: 2021-09-21 | End: 2021-09-21 | Stop reason: HOSPADM

## 2021-09-21 RX ORDER — ASPIRIN 81 MG/1
81 TABLET, CHEWABLE ORAL ONCE
Status: DISCONTINUED | OUTPATIENT
Start: 2021-09-21 | End: 2021-09-21 | Stop reason: HOSPADM

## 2021-09-21 RX ORDER — ASPIRIN 325 MG
325 TABLET ORAL ONCE
Status: DISCONTINUED | OUTPATIENT
Start: 2021-09-21 | End: 2021-09-21 | Stop reason: HOSPADM

## 2021-09-21 RX ORDER — FLUMAZENIL 0.1 MG/ML
0.2 INJECTION, SOLUTION INTRAVENOUS
Status: DISCONTINUED | OUTPATIENT
Start: 2021-09-21 | End: 2021-09-21 | Stop reason: HOSPADM

## 2021-09-21 RX ORDER — HEPARIN SODIUM 10000 [USP'U]/100ML
100-1000 INJECTION, SOLUTION INTRAVENOUS CONTINUOUS PRN
Status: DISCONTINUED | OUTPATIENT
Start: 2021-09-21 | End: 2021-09-21 | Stop reason: HOSPADM

## 2021-09-21 RX ORDER — EPTIFIBATIDE 2 MG/ML
2 INJECTION, SOLUTION INTRAVENOUS CONTINUOUS PRN
Status: DISCONTINUED | OUTPATIENT
Start: 2021-09-21 | End: 2021-09-21 | Stop reason: HOSPADM

## 2021-09-21 RX ORDER — ONDANSETRON 2 MG/ML
4 INJECTION INTRAMUSCULAR; INTRAVENOUS EVERY 6 HOURS PRN
Status: DISCONTINUED | OUTPATIENT
Start: 2021-09-21 | End: 2021-09-21 | Stop reason: HOSPADM

## 2021-09-21 RX ADMIN — SODIUM CHLORIDE: 9 INJECTION, SOLUTION INTRAVENOUS at 07:06

## 2021-09-21 ASSESSMENT — MIFFLIN-ST. JEOR: SCORE: 1577.32

## 2021-09-21 NOTE — PROGRESS NOTES
Care Suites Admission Nursing Note    Patient Information  Name: Odessa Elliott  Age: 66 year old  Reason for admission: Left heart cath  Care Suites arrival time: 0635    Visitor Information  Name: Marcy  Informed of visitor restrictions: Yes  1 visitor allowed per patient   Visitor must screen negative for COVID symptoms   Visitor must wear a mask  Waiting rooms closed to visitors    Patient Admission/Assessment   Pre-procedure assessment complete: Yes  If abnormal assessment/labs, provider notified: N/A  NPO: Yes  Medications held per instructions/orders: Yes  Consent: deferred  If applicable, pregnancy test status: deferred  Patient oriented to room: Yes  Education/questions answered: Yes  Plan/other: Reviewed plan for today, questions answered.    Discharge Planning  Discharge name/phone number: Marcy   Overnight post sedation caregiver: as above  Discharge location: [place]    No Ledesma RN

## 2021-09-21 NOTE — PROGRESS NOTES
Care Suites Post Procedure Note    Patient Information  Name: Odessa Elliott  Age: 66 year old    Post Procedure  Time patient returned to Care Suites: 1000  Concerns/abnormal assessment: none. Right groin site CDI with angioseal. Flat soft no bruising/bleeding'hematoma, 2+ pedal pulses, good CMS, VSS sinus aminta. Drowsy awakens to voice. Wife at bedside.  If abnormal assessment, provider notified: N/A  Plan/Other: 3 hours bedrest then discharge.     No Ledesma, RN     1310 Off bedrest, up and walked to bathroom. Groin checked after walking and hematoma noted. Manual pressure for 7 minutes to reduce. Pt denies pain.  VSS. Scant blood at right groin site.  Call to Dr. Perez. Will extend BR for 1 hour.  Pt on flat BR for now. No further changes in groin site. BR until 1445.

## 2021-09-21 NOTE — PROGRESS NOTES
PATIENT/VISITOR WELLNESS SCREENING    Step 1 Patient Screening    1. In the last month, have you been in contact with someone who was confirmed or suspected to have Coronavirus/COVID-19? No    2. Do you have the following symptoms?  Fever/Chills? No   Cough? No   Shortness of breath? No   New loss of taste or smell? No  Sore throat? No  Muscle or body aches? No  Headaches? No  Fatigue? No  Vomiting or diarrhea? No    Step 2 Visitor Screening    1. Name of Visitor (1 visitor per patient): Marcy    2. In the last month, have you been in contact with someone who was confirmed or suspected to have Coronavirus/COVID-19? No    3. Do you have the following symptoms?  Fever/Chills? No   Cough? No   Shortness of breath? No   Skin rash? No   Loss of taste or smell? No  Sore throat? No  Runny or stuffy nose? No  Muscle or body aches? No  Headaches? No  Fatigue? No  Vomiting or diarrhea? No    If the visitor has positive symptoms, notify supervisor/manger  Per policy, the visitor will need to leave the facility     Step 3 Refer to logic grid below for actions    NO SYMPTOM(S)    ACTIONS:  1. Standard rooming process  2. Provider to assess per normal protocol  3. Implement precautions as needed and per guidelines     POSITIVE SYMPTOM(S)  If positive for ANY of the following symptoms: fever, cough, shortness of breath, rash    ACTION:  1. Continue to have the patient wear a mask   2. Room patient as soon as possible  3. Don appropriate PPE when entering room  4. Provider evaluation

## 2021-09-21 NOTE — DISCHARGE INSTRUCTIONS
Cardiac Angiogram Discharge Instructions - Femoral    After you go home:      Have an adult stay with you until tomorrow.    Drink extra fluids for 2 days.    You may resume your normal diet.    No smoking       For 24 hours - due to the sedation you received:    Relax and take it easy.    Do NOT make any important or legal decisions.    Do NOT drive or operate machines at home or at work.    Do NOT drink alcohol.    Care of Groin Puncture Site:      For the first 24 hrs - check the puncture site every 1-2 hours while awake.    For 2 days, when you cough, sneeze, laugh or move your bowels, hold your hand over the puncture site and press firmly.    Remove the bandaid after 24 hours. If there is minor oozing, apply another bandaid and remove it after 12 hours.    It is normal to have a small bruise or pea size lump at the site.    You may shower tomorrow. Do NOT take a bath, or use a hot tub or pool for at least 3 days. Do NOT scrub the site. Do not use lotion or powder near the puncture site.    Activity:            For 2 days:    No stooping or squatting    Do NOT do any heavy activity such as exercise, lifting, or straining.     No housework, yard work or any activity that make you sweat    Do NOT lift more than 10 pounds    Bleeding:      If you start bleeding from the site in your groin, lie down flat and press firmly on/above the site for 10 minutes.     Once bleeding stops, lay flat for 2 hours.     Call [facility] as soon as you can.       Call 911 right away if you have heavy bleeding or bleeding that does not stop.      Medicines:      If you are taking an antiplatelet medication such as Plavix, do not stop taking it until you talk to your cardiologist.      If you have stopped any medicines, check with your provider about when to restart them.    Follow Up Appointments:      Follow up with [facility] Nurse Practitioner at [facility] of [facility] preference in 7-10 days.    Call the clinic if:      You  have increased pain or a large or growing hard lump around the site.    The site is red, swollen, hot or tender.    Blood or fluid is draining from the site.    You have chills or a fever greater than 101 F (38 C).    Your leg feels numb, cool or changes color.    You have hives, a rash or unusual itching.    New pain in the back or belly that you cannot control with Tylenol.    Any questions or concerns.          [facility]:    [phone] UMP (7 days a week)

## 2021-09-21 NOTE — Clinical Note
The first balloon was inserted into the left anterior descending.Max pressure = 15 mona. Total duration = 10 seconds.

## 2021-09-21 NOTE — Clinical Note
The first balloon was inserted into the left anterior descending.Max pressure = 12 mona. Total duration = 15 seconds.

## 2021-09-21 NOTE — PROVIDER NOTIFICATION
1504 Dr Perez called regarding bradycardic rhythm and diaphoresis when ambulating post procedure. Order received for 250cc bolus and one hour bedrest.    1605 Ambulated in hernandez, tolerated well.    1615 Dr. Perez at bedside. OK to discharge.

## 2021-09-21 NOTE — PROGRESS NOTES
Care Suites Discharge Nursing Note    Patient Information  Name: Odessa Elliott  Age: 66 year old    Discharge Education:  Discharge instructions reviewed: Yes, by No HENDRICKS.  Additional education/resources provided: Contrast sheet given.  Patient/patient representative verbalizes understanding: N/A  Patient discharging on new medications: No  Medication education completed: Yes    Discharge Plans:   Discharge location: home  Discharge ride contacted: Yes  Approximate discharge time: 1645    Discharge Criteria:  Discharge criteria met and vital signs stable: Yes    Patient Belongs:  Patient belongings returned to patient: Yes    Chanelle Briceno RN

## 2021-09-21 NOTE — PRE-PROCEDURE
GENERAL PRE-PROCEDURE:     Consent given by:  Patient  Patient states understanding of procedure being performed: Yes    Patient's understanding of procedure matches consent: Yes    Procedure consent matches procedure scheduled: Yes    Expected level of sedation:  Moderate  Appropriately NPO:  Yes  Mallampati  :  Grade 2- soft palate, base of uvula, tonsillar pillars, and portion of posterior pharyngeal wall visible  History & Physical reviewed:  History and physical reviewed and no updates needed  Statement of review:  I have reviewed the lab findings, diagnostic data, medications, and the plan for sedation

## 2021-09-24 ENCOUNTER — TELEPHONE (OUTPATIENT)
Dept: CARDIOLOGY | Facility: CLINIC | Age: 66
End: 2021-09-24

## 2021-09-24 LAB
ATRIAL RATE - MUSE: 45 BPM
ATRIAL RATE - MUSE: 52 BPM
DIASTOLIC BLOOD PRESSURE - MUSE: NORMAL MMHG
DIASTOLIC BLOOD PRESSURE - MUSE: NORMAL MMHG
INTERPRETATION ECG - MUSE: NORMAL
INTERPRETATION ECG - MUSE: NORMAL
P AXIS - MUSE: 55 DEGREES
P AXIS - MUSE: 61 DEGREES
PR INTERVAL - MUSE: 164 MS
PR INTERVAL - MUSE: 172 MS
QRS DURATION - MUSE: 104 MS
QRS DURATION - MUSE: 98 MS
QT - MUSE: 452 MS
QT - MUSE: 458 MS
QTC - MUSE: 396 MS
QTC - MUSE: 420 MS
R AXIS - MUSE: 33 DEGREES
R AXIS - MUSE: 58 DEGREES
SYSTOLIC BLOOD PRESSURE - MUSE: NORMAL MMHG
SYSTOLIC BLOOD PRESSURE - MUSE: NORMAL MMHG
T AXIS - MUSE: 53 DEGREES
T AXIS - MUSE: 70 DEGREES
VENTRICULAR RATE- MUSE: 45 BPM
VENTRICULAR RATE- MUSE: 52 BPM

## 2021-09-24 NOTE — TELEPHONE ENCOUNTER
Angiogram results reviewed, no PCI performed. Patient to take Plavix x30 days, can uptitrate Imdur. Patient cancelled ADRIANO follow-up scheduled 9/28/21.    Telephoned patient to inquire about how he is feeling after angiogram, follow-up on medications. Left voicemail for patient requesting call back.    ADDENDUM 1110-Patient returned call, he is feeling well post-angiogram. Denies questions or concerns on DAPT, requested patient call with bleeding concerns or any sign of overt bleeding. Small bruising to leg, below access site. Pt denies pain, has not grown in size. Pt has enrolled in cardiac rehab. Follow-up appt scheduled with Dr. Perez 11/30/21 to reassess symptoms. Patient has my direct contact information for further questions or concerns.    Raisa Baeza RN  [facility]

## 2021-09-28 ENCOUNTER — TELEPHONE (OUTPATIENT)
Dept: CARDIOLOGY | Facility: CLINIC | Age: 66
End: 2021-09-28

## 2021-09-28 NOTE — TELEPHONE ENCOUNTER
"Patient's wife called to report area of bruising still present on patient's upper inner thigh. It is about 4\"x4\" per wife and is still dark purple in color. Patient reports the area is tender but has no pain, it has not grown in size or darkened in color. Wife is concerned as pt wants to go biking on Saturday and she wants to know if this is safe to do. Cath records reviewed, manual pressure applied in Care Suites due to hematoma after ambulation. Told Marcy that as long as patient was feeling well, this was likely okay but I would discuss with Dr. Perez and call her back.    Reviewed with Dr. Perez, okay for light activity but would request that patient delay bike riding another week until bruising resolves. Left message on wife's phone with these recommendations.    Raisa Baeza RN  [facility]    "

## 2021-10-06 DIAGNOSIS — I25.118 CORONARY ARTERY DISEASE OF NATIVE ARTERY OF NATIVE HEART WITH STABLE ANGINA PECTORIS (H): Primary | ICD-10-CM

## 2021-10-07 ENCOUNTER — HOSPITAL ENCOUNTER (OUTPATIENT)
Dept: CARDIAC REHAB | Facility: CLINIC | Age: 66
End: 2021-10-07
Attending: STUDENT IN AN ORGANIZED HEALTH CARE EDUCATION/TRAINING PROGRAM
Payer: COMMERCIAL

## 2021-10-07 DIAGNOSIS — I25.118 CORONARY ARTERY DISEASE OF NATIVE ARTERY OF NATIVE HEART WITH STABLE ANGINA PECTORIS (H): ICD-10-CM

## 2021-10-07 DIAGNOSIS — I25.10 CORONARY ARTERY DISEASE INVOLVING NATIVE CORONARY ARTERY OF NATIVE HEART WITHOUT ANGINA PECTORIS: ICD-10-CM

## 2021-10-07 LAB — ACT BLD: 211 SECONDS (ref 74–150)

## 2021-10-07 PROCEDURE — 93797 PHYS/QHP OP CAR RHAB WO ECG: CPT | Mod: 59 | Performed by: REHABILITATION PRACTITIONER

## 2021-10-07 PROCEDURE — 93798 PHYS/QHP OP CAR RHAB W/ECG: CPT | Performed by: REHABILITATION PRACTITIONER

## 2021-11-16 DIAGNOSIS — M10.9 ACUTE GOUTY ARTHRITIS: ICD-10-CM

## 2021-11-17 RX ORDER — ALLOPURINOL 100 MG/1
TABLET ORAL
Qty: 90 TABLET | Refills: 3 | Status: SHIPPED | OUTPATIENT
Start: 2021-11-17 | End: 2022-03-30

## 2021-11-30 ENCOUNTER — OFFICE VISIT (OUTPATIENT)
Dept: CARDIOLOGY | Facility: CLINIC | Age: 66
End: 2021-11-30
Payer: COMMERCIAL

## 2021-11-30 VITALS
DIASTOLIC BLOOD PRESSURE: 76 MMHG | OXYGEN SATURATION: 98 % | HEART RATE: 61 BPM | HEIGHT: 71 IN | SYSTOLIC BLOOD PRESSURE: 131 MMHG | WEIGHT: 174 LBS | BODY MASS INDEX: 24.36 KG/M2

## 2021-11-30 DIAGNOSIS — I20.89 STABLE ANGINA (H): Primary | ICD-10-CM

## 2021-11-30 DIAGNOSIS — I77.810 ASCENDING AORTA DILATATION (H): ICD-10-CM

## 2021-11-30 DIAGNOSIS — I25.118 CORONARY ARTERY DISEASE OF NATIVE ARTERY OF NATIVE HEART WITH STABLE ANGINA PECTORIS (H): ICD-10-CM

## 2021-11-30 PROCEDURE — 99207 PR NO DOCUMENTATION ON VISIT: CPT | Performed by: INTERNAL MEDICINE

## 2021-11-30 RX ORDER — ISOSORBIDE MONONITRATE 60 MG/1
60 TABLET, EXTENDED RELEASE ORAL DAILY
Qty: 90 TABLET | Refills: 0 | Status: SHIPPED | OUTPATIENT
Start: 2021-11-30 | End: 2022-01-21 | Stop reason: DRUGHIGH

## 2021-11-30 RX ORDER — ISOSORBIDE MONONITRATE 60 MG/1
60 TABLET, EXTENDED RELEASE ORAL DAILY
Qty: 180 TABLET | Refills: 3 | Status: SHIPPED | OUTPATIENT
Start: 2021-11-30 | End: 2021-12-02

## 2021-11-30 RX ORDER — ISOSORBIDE DINITRATE AND HYDRALAZINE HYDROCHLORIDE 37.5; 2 MG/1; MG/1
1 TABLET ORAL 3 TIMES DAILY
COMMUNITY
End: 2021-11-30

## 2021-11-30 ASSESSMENT — MIFFLIN-ST. JEOR: SCORE: 1586.39

## 2021-11-30 NOTE — LETTER
11/30/2021    Mikie Lazaro MD, MD  [address]  [facility] [address]    RE: Odessa Elliott       Dear Colleague,     I had the pleasure of seeing Odessa Elliott in the [facility].  No notes on file    Thank you for allowing me to participate in the care of your patient.      Sincerely,     Armani Perez MD, MD     [facility]  cc:   Mikie Lazaro MD  [address]  [place] [address]

## 2021-12-02 DIAGNOSIS — I25.118 CORONARY ARTERY DISEASE OF NATIVE ARTERY OF NATIVE HEART WITH STABLE ANGINA PECTORIS (H): ICD-10-CM

## 2021-12-02 RX ORDER — ISOSORBIDE MONONITRATE 60 MG/1
60 TABLET, EXTENDED RELEASE ORAL DAILY
Qty: 90 TABLET | Refills: 3 | Status: SHIPPED | OUTPATIENT
Start: 2021-12-02 | End: 2022-01-21

## 2021-12-03 DIAGNOSIS — I25.10 CORONARY ARTERY DISEASE INVOLVING NATIVE CORONARY ARTERY OF NATIVE HEART WITHOUT ANGINA PECTORIS: ICD-10-CM

## 2021-12-06 RX ORDER — LISINOPRIL 2.5 MG/1
TABLET ORAL
Qty: 90 TABLET | Refills: 3 | Status: SHIPPED | OUTPATIENT
Start: 2021-12-06 | End: 2022-09-14

## 2021-12-06 NOTE — TELEPHONE ENCOUNTER
BP Readings from Last 3 Encounters:   11/30/21 131/76   09/21/21 101/72   08/05/21 128/89     Creatinine   Date Value Ref Range Status   09/21/2021 0.97 0.66 - 1.25 mg/dL Final   11/30/2020 0.86 0.66 - 1.25 mg/dL Final

## 2022-01-09 ENCOUNTER — HEALTH MAINTENANCE LETTER (OUTPATIENT)
Age: 67
End: 2022-01-09

## 2022-01-17 ENCOUNTER — HOSPITAL ENCOUNTER (OUTPATIENT)
Dept: CARDIOLOGY | Facility: CLINIC | Age: 67
Discharge: HOME | End: 2022-01-17
Attending: INTERNAL MEDICINE | Admitting: INTERNAL MEDICINE
Payer: COMMERCIAL

## 2022-01-17 DIAGNOSIS — I77.810 ASCENDING AORTA DILATATION (H): ICD-10-CM

## 2022-01-17 LAB — LVEF ECHO: NORMAL

## 2022-01-17 PROCEDURE — 93306 TTE W/DOPPLER COMPLETE: CPT

## 2022-01-17 PROCEDURE — 93306 TTE W/DOPPLER COMPLETE: CPT | Mod: 26 | Performed by: INTERNAL MEDICINE

## 2022-01-21 ENCOUNTER — OFFICE VISIT (OUTPATIENT)
Dept: CARDIOLOGY | Facility: CLINIC | Age: 67
End: 2022-01-21
Payer: COMMERCIAL

## 2022-01-21 VITALS
SYSTOLIC BLOOD PRESSURE: 133 MMHG | BODY MASS INDEX: 24.22 KG/M2 | WEIGHT: 173 LBS | HEART RATE: 52 BPM | DIASTOLIC BLOOD PRESSURE: 83 MMHG | OXYGEN SATURATION: 99 % | HEIGHT: 71 IN

## 2022-01-21 DIAGNOSIS — I25.118 CORONARY ARTERY DISEASE OF NATIVE ARTERY OF NATIVE HEART WITH STABLE ANGINA PECTORIS (H): ICD-10-CM

## 2022-01-21 DIAGNOSIS — I20.89 STABLE ANGINA (H): Primary | ICD-10-CM

## 2022-01-21 PROCEDURE — 99214 OFFICE O/P EST MOD 30 MIN: CPT | Performed by: INTERNAL MEDICINE

## 2022-01-21 RX ORDER — ISOSORBIDE MONONITRATE 30 MG/1
90 TABLET, EXTENDED RELEASE ORAL DAILY
Qty: 270 TABLET | Refills: 3 | Status: SHIPPED | OUTPATIENT
Start: 2022-01-21 | End: 2022-01-21

## 2022-01-21 RX ORDER — ISOSORBIDE MONONITRATE 30 MG/1
90 TABLET, EXTENDED RELEASE ORAL DAILY
Qty: 90 TABLET | Refills: 0 | Status: SHIPPED | OUTPATIENT
Start: 2022-01-21 | End: 2022-09-14

## 2022-01-21 ASSESSMENT — MIFFLIN-ST. JEOR: SCORE: 1581.85

## 2022-01-21 NOTE — PROGRESS NOTES
HPI and Plan:   Service Date: 08/05/2021    REASON FOR CONSULTATION:  Followup for coronary artery disease and chronic stable angina.    HISTORY OF PRESENT ILLNESS:  I had the pleasure of seeing Odessa Elliott at [facility] in [place] this morning.  He is a very pleasant, 66-year-old gentleman who is well known to me.  He has known coronary artery disease and hyperlipidemia.  In 2013, we evaluated him for unstable angina.  Coronary angiogram at that time revealed severe 3 vessel coronary artery disease.  He subsequently underwent coronary artery bypass grafting surgery x5 with a LIMA to LAD, vein graft to the right posterior descending artery, vein graft to the right posterolateral branch, vein graft to the obtuse marginal 1 and vein graft to the obtuse marginal branch 2.  He did well the years following his surgery.    In October of 2020, he started noticing exertional chest burning.  The episodes were initially brought on by strenuous activity, but subsequently came on with his usual walks.  His wife who is a nurse was not aware of his symptoms.  When he mentioned those symptoms, she brought him to the emergency department.  This was in November of 2020.  His troponin was mildly elevated without significant delta.  He then proceeded to have coronary and graft angiography, which I have personally reviewed.  The angiogram revealed severe native vessel coronary artery disease as expected.  His LIMA to the LAD was widely patent.  His SVG to RCA was also widely patent.  The SVG to the right posterolateral branch was patent, although the graft was small.  The vein graft to the first obtuse marginal branch was patent, and the SVG to the second obtuse marginal branch was patent.  His RCA was chronically occluded.  His left main had moderate to severe disease, and the proximal LAD, which supplied a moderate-sized diagonal branch, had 95% calcific stenosis.    After his angiogram, the patient was  commenced on medical therapy.  He was initially on Imdur 30 mg daily with improved symptoms.  Subsequently, I saw him in February 2021.  At that time he was still complaining of chest pain with exertion.  I then increased his Imdur to 60 mg daily.  He mentioned stable but ongoing chest discomfort on our next visit which was August 2021.  He subsequent proceeded to have repeat angiogram in September of last year.  An attempt to stent the diagonal branch was unsuccessful.  The lesion was heavily calcified.  Were able to wire it but could not cross it with a balloon.  Given the size of the vessel and the potential need of atherectomy we took the patient off the table.  I then saw him in November to discuss escalation of medical therapy versus complex intervention.  We elected to increase his Imdur to 90 mg daily.  Since then his symptoms have dramatically improved.  He notices occasional chest tightness with high workload.  No symptoms with his usual activities.    The patient had echocardiogram last week which I reviewed.  His LV function remains preserved.  There are no wall motion normalities.  His aortic root is moderately dilated and measured 4.6 cm on the most recent echo.      IMPRESSION AND PLAN:    1.  Chronic stable angina.  2.  Coronary artery disease, status post prior CABG x5 in 09/2013.  3.  Hyperlipidemia.    Mr. Elliott's stable angina symptoms have significantly improved after increasing the Imdur dose.  He now only gets mild chest discomfort with high workloads.  Again reviewed indications for revascularization.  I told the patient that going after that diagonal branch which is heavily calcified will not prevent future heart attacks or improve survival.  As such, I would favor continue medical therapy.  If his symptoms become worse or unacceptable then we will proceed with atherectomy and stenting of the diagonal branch.  The patient is very much in agreement with this plan.    His aortic root is  slightly dilated compared to his last echocardiogram.  We will repeat another echocardiogram in 1 year.  His risk factors including his blood pressure well controlled.    I appreciate the opportunity to participate in his care.    Armani Perez MD      Orders Placed This Encounter   Procedures     Follow-Up with Cardiology EP       Orders Placed This Encounter   Medications     DISCONTD: isosorbide mononitrate (IMDUR) 30 MG 24 hr tablet     Sig: Take 3 tablets (90 mg) by mouth daily     Dispense:  270 tablet     Refill:  3     isosorbide mononitrate (IMDUR) 30 MG 24 hr tablet     Sig: Take 3 tablets (90 mg) by mouth daily     Dispense:  90 tablet     Refill:  0       Medications Discontinued During This Encounter   Medication Reason     clopidogrel (PLAVIX) 75 MG tablet Therapy completed     isosorbide mononitrate (IMDUR) 60 MG 24 hr tablet Dose adjustment     isosorbide mononitrate (IMDUR) 60 MG 24 hr tablet Reorder     isosorbide mononitrate (IMDUR) 30 MG 24 hr tablet Reorder         Encounter Diagnoses   Name Primary?     Stable angina (H) Yes     Coronary artery disease of native artery of native heart with stable angina pectoris (H)        CURRENT MEDICATIONS:  Current Outpatient Medications   Medication Sig Dispense Refill     allopurinol (ZYLOPRIM) 100 MG tablet TAKE 1 TABLET DAILY (Patient taking differently: Takes 1/2 tablet daily) 90 tablet 3     aspirin 81 MG tablet Take 81 mg by mouth daily       atorvastatin (LIPITOR) 80 MG tablet Take 1 tablet (80 mg) by mouth daily 90 tablet 3     colchicine (COLCRYS) 0.6 MG tablet Take 2 tabs (1.2mg) by mouth at the first sign of a gout attack, then 1 tab (0.6mg) 1 hour later.  Max 3 tabs (1.8mg) over 1 hour. 30 tablet 3     isosorbide mononitrate (IMDUR) 30 MG 24 hr tablet Take 3 tablets (90 mg) by mouth daily 90 tablet 0     lisinopril (ZESTRIL) 2.5 MG tablet TAKE 1 TABLET DAILY 90 tablet 3     metoprolol succinate ER (TOPROL-XL) 25 MG 24 hr tablet Take 0.5 tablets  (12.5 mg) by mouth daily 45 tablet 2     nitroGLYcerin (NITROSTAT) 0.4 MG sublingual tablet For chest pain place 1 tablet under the tongue every 5 minutes for 3 doses. If symptoms persist 5 minutes after 1st dose call 911. 30 tablet 0     NONFORMULARY Take by mouth daily Collagen 1 scoop       UNKNOWN TO PATIENT Turmeric       vitamin C (ASCORBIC ACID) 1000 MG TABS Take 1,000 mg by mouth daily        Vitamin D, Cholecalciferol, 1000 UNITS TABS Take 1,000 Units by mouth daily        Multiple Vitamin (MULTI VITAMIN DAILY PO) Take 1 tablet by mouth daily  (Patient not taking: Reported on 11/30/2021)         ALLERGIES     Allergies   Allergen Reactions     No Known Allergies        PAST MEDICAL HISTORY:  Past Medical History:   Diagnosis Date     Acute gouty arthritis      Aortic root dilatation (H)      Ascending aorta dilatation (H)      Chest pain      Coronary artery disease      Gout 11/24/2020     Hyperlipidaemia      Lower GI bleed 09/30/2013     NSTEMI (non-ST elevated myocardial infarction) (H) 11/24/2020     Osteoarthritis     s/p right IRENE 11/26/19 at [facility]     S/P CABG x 5 2013    LIMA-LAD, SVG-PDA, SVG-PL, SVG-OM1, SVG- OM2 (9/2013)     Shortness of breath      Stable angina (H)      Status post coronary angiogram 09/21/2021     Unstable angina (H) 11/24/2020     Urinary retention        PAST SURGICAL HISTORY:  Past Surgical History:   Procedure Laterality Date     BYPASS GRAFT ARTERY CORONARY  9/23/2013    Procedure: BYPASS GRAFT ARTERY CORONARY;  CORONARY ARTERY BYPASS GRAFT X 5  WITH ENDOSCOPIC VEIN HARVESTING (LAD, PDA, SIMRAN, OM1 AND OM2 );  Surgeon: Kwadwo Canales MD;  Location:  OR     COLONOSCOPY  10/2013    Shortly after bypass surgery     CORONARY ARTERY BYPASS  2013    LIMA =LAD,svg=PDA,Svg=PLRca, Svg=Om1,Svg=OM2     CV CORONARY ANGIOGRAM N/A 9/21/2021    Procedure: Coronary Angiogram;  Surgeon: Armani Perez MD;  Location:  HEART CARDIAC CATH LAB     CV HEART CATHETERIZATION  WITH POSSIBLE INTERVENTION N/A 11/24/2020    Procedure: Heart Catheterization with Possible Intervention;  Surgeon: William Collier MD;  Location:  HEART CARDIAC CATH LAB     NO HISTORY OF SURGERY       ORTHOPEDIC SURGERY      cortisone shot in back for pain       FAMILY HISTORY:  Family History   Problem Relation Age of Onset     Lipids Mother      Hypertension Mother      Hyperlipidemia Mother      Cancer Paternal Grandmother      Hypertension Father      Hyperlipidemia Father      C.A.D. No family hx of      Cerebrovascular Disease No family hx of        SOCIAL HISTORY:  Social History     Socioeconomic History     Marital status:      Spouse name: None     Number of children: None     Years of education: None     Highest education level: None   Occupational History     None   Tobacco Use     Smoking status: Never Smoker     Smokeless tobacco: Never Used   Substance and Sexual Activity     Alcohol use: Not Currently     Alcohol/week: 6.0 standard drinks     Drug use: No     Sexual activity: Yes     Partners: Female     Birth control/protection: None   Other Topics Concern      Service Not Asked     Blood Transfusions Not Asked     Caffeine Concern Yes     Comment: 4 cups coffee daily     Occupational Exposure Not Asked     Hobby Hazards Not Asked     Sleep Concern No     Stress Concern Not Asked     Weight Concern Not Asked     Special Diet Not Asked     Back Care Not Asked     Exercise Yes     Comment: daily walking, biking, golf      Bike Helmet Not Asked     Seat Belt Yes     Self-Exams Not Asked     Parent/sibling w/ CABG, MI or angioplasty before 65F 55M? No   Social History Narrative    , 1 daughter    Employed for [employer]     Social Determinants of Health     Financial Resource Strain: Not on file   Food Insecurity: Not on file   Transportation Needs: Not on file   Physical Activity: Not on file   Stress: Not on file   Social Connections: Not on  "file   Intimate Partner Violence: Not on file   Housing Stability: Not on file       Review of Systems:  Skin:  Negative       Eyes:  Negative      ENT:  Positive for hearing loss hearing aids in both ears  Respiratory:  Negative       Cardiovascular:  Negative      Gastroenterology: Negative      Genitourinary:  Negative      Musculoskeletal:  Negative      Neurologic:  Negative      Psychiatric:  Negative      Heme/Lymph/Imm:  Negative      Endocrine:  Negative        Physical Exam:  Vitals: /83   Pulse 52   Ht 1.803 m (5' 11\")   Wt 78.5 kg (173 lb)   SpO2 99%   BMI 24.13 kg/m      Constitutional:  cooperative;in no acute distress        Skin:  warm and dry to the touch          Head:  normocephalic        Eyes:  conjunctivae and lids unremarkable        Lymph:      ENT:  no pallor or cyanosis        Neck:  JVP normal;no carotid bruit        Respiratory:  clear to auscultation         Cardiac: regular rhythm;no murmurs, gallops or rubs detected                pulses full and equal                                        GI:  abdomen soft;non-tender        Extremities and Muscular Skeletal:  no edema              Neurological:  no gross motor deficits        Psych:  Alert and Oriented x 3        CC  Armani Perez MD  [address]  [place] [id number]              "

## 2022-01-21 NOTE — LETTER
1/21/2022    Mikie Lazaro MD, MD  [address] [facility] [address]  [place] [address]    RE: Odessa Elliott       Dear Colleague,     I had the pleasure of seeing Odessa Elliott in the [facility].  HPI and Plan:   Service Date: 08/05/2021    REASON FOR CONSULTATION:  Followup for coronary artery disease and chronic stable angina.    HISTORY OF PRESENT ILLNESS:  I had the pleasure of seeing Odessa Elliott at [facility] in [place] this morning.  He is a very pleasant, 66-year-old gentleman who is well known to me.  He has known coronary artery disease and hyperlipidemia.  In 2013, we evaluated him for unstable angina.  Coronary angiogram at that time revealed severe 3 vessel coronary artery disease.  He subsequently underwent coronary artery bypass grafting surgery x5 with a LIMA to LAD, vein graft to the right posterior descending artery, vein graft to the right posterolateral branch, vein graft to the obtuse marginal 1 and vein graft to the obtuse marginal branch 2.  He did well the years following his surgery.    In October of 2020, he started noticing exertional chest burning.  The episodes were initially brought on by strenuous activity, but subsequently came on with his usual walks.  His wife who is a nurse was not aware of his symptoms.  When he mentioned those symptoms, she brought him to the emergency department.  This was in November of 2020.  His troponin was mildly elevated without significant delta.  He then proceeded to have coronary and graft angiography, which I have personally reviewed.  The angiogram revealed severe native vessel coronary artery disease as expected.  His LIMA to the LAD was widely patent.  His SVG to RCA was also widely patent.  The SVG to the right posterolateral branch was patent, although the graft was small.  The vein graft to the first obtuse marginal branch was patent, and the SVG to the second obtuse marginal branch was patent.  His RCA  was chronically occluded.  His left main had moderate to severe disease, and the proximal LAD, which supplied a moderate-sized diagonal branch, had 95% calcific stenosis.    After his angiogram, the patient was commenced on medical therapy.  He was initially on Imdur 30 mg daily with improved symptoms.  Subsequently, I saw him in February 2021.  At that time he was still complaining of chest pain with exertion.  I then increased his Imdur to 60 mg daily.  He mentioned stable but ongoing chest discomfort on our next visit which was August 2021.  He subsequent proceeded to have repeat angiogram in September of last year.  An attempt to stent the diagonal branch was unsuccessful.  The lesion was heavily calcified.  Were able to wire it but could not cross it with a balloon.  Given the size of the vessel and the potential need of atherectomy we took the patient off the table.  I then saw him in November to discuss escalation of medical therapy versus complex intervention.  We elected to increase his Imdur to 90 mg daily.  Since then his symptoms have dramatically improved.  He notices occasional chest tightness with high workload.  No symptoms with his usual activities.    The patient had echocardiogram last week which I reviewed.  His LV function remains preserved.  There are no wall motion normalities.  His aortic root is moderately dilated and measured 4.6 cm on the most recent echo.      IMPRESSION AND PLAN:    1.  Chronic stable angina.  2.  Coronary artery disease, status post prior CABG x5 in 09/2013.  3.  Hyperlipidemia.    Mr. Elliott's stable angina symptoms have significantly improved after increasing the Imdur dose.  He now only gets mild chest discomfort with high workloads.  Again reviewed indications for revascularization.  I told the patient that going after that diagonal branch which is heavily calcified will not prevent future heart attacks or improve survival.  As such, I would favor continue medical  therapy.  If his symptoms become worse or unacceptable then we will proceed with atherectomy and stenting of the diagonal branch.  The patient is very much in agreement with this plan.    His aortic root is slightly dilated compared to his last echocardiogram.  We will repeat another echocardiogram in 1 year.  His risk factors including his blood pressure well controlled.    I appreciate the opportunity to participate in his care.    Armani Perez MD      Orders Placed This Encounter   Procedures     Follow-Up with Cardiology EP       Orders Placed This Encounter   Medications     DISCONTD: isosorbide mononitrate (IMDUR) 30 MG 24 hr tablet     Sig: Take 3 tablets (90 mg) by mouth daily     Dispense:  270 tablet     Refill:  3     isosorbide mononitrate (IMDUR) 30 MG 24 hr tablet     Sig: Take 3 tablets (90 mg) by mouth daily     Dispense:  90 tablet     Refill:  0       Medications Discontinued During This Encounter   Medication Reason     clopidogrel (PLAVIX) 75 MG tablet Therapy completed     isosorbide mononitrate (IMDUR) 60 MG 24 hr tablet Dose adjustment     isosorbide mononitrate (IMDUR) 60 MG 24 hr tablet Reorder     isosorbide mononitrate (IMDUR) 30 MG 24 hr tablet Reorder         Encounter Diagnoses   Name Primary?     Stable angina (H) Yes     Coronary artery disease of native artery of native heart with stable angina pectoris (H)        CURRENT MEDICATIONS:  Current Outpatient Medications   Medication Sig Dispense Refill     allopurinol (ZYLOPRIM) 100 MG tablet TAKE 1 TABLET DAILY (Patient taking differently: Takes 1/2 tablet daily) 90 tablet 3     aspirin 81 MG tablet Take 81 mg by mouth daily       atorvastatin (LIPITOR) 80 MG tablet Take 1 tablet (80 mg) by mouth daily 90 tablet 3     colchicine (COLCRYS) 0.6 MG tablet Take 2 tabs (1.2mg) by mouth at the first sign of a gout attack, then 1 tab (0.6mg) 1 hour later.  Max 3 tabs (1.8mg) over 1 hour. 30 tablet 3     isosorbide mononitrate (IMDUR) 30 MG 24  hr tablet Take 3 tablets (90 mg) by mouth daily 90 tablet 0     lisinopril (ZESTRIL) 2.5 MG tablet TAKE 1 TABLET DAILY 90 tablet 3     metoprolol succinate ER (TOPROL-XL) 25 MG 24 hr tablet Take 0.5 tablets (12.5 mg) by mouth daily 45 tablet 2     nitroGLYcerin (NITROSTAT) 0.4 MG sublingual tablet For chest pain place 1 tablet under the tongue every 5 minutes for 3 doses. If symptoms persist 5 minutes after 1st dose call 911. 30 tablet 0     NONFORMULARY Take by mouth daily Collagen 1 scoop       UNKNOWN TO PATIENT Turmeric       vitamin C (ASCORBIC ACID) 1000 MG TABS Take 1,000 mg by mouth daily        Vitamin D, Cholecalciferol, 1000 UNITS TABS Take 1,000 Units by mouth daily        Multiple Vitamin (MULTI VITAMIN DAILY PO) Take 1 tablet by mouth daily  (Patient not taking: Reported on 11/30/2021)         ALLERGIES     Allergies   Allergen Reactions     No Known Allergies        PAST MEDICAL HISTORY:  Past Medical History:   Diagnosis Date     Acute gouty arthritis      Aortic root dilatation (H)      Ascending aorta dilatation (H)      Chest pain      Coronary artery disease      Gout 11/24/2020     Hyperlipidaemia      Lower GI bleed 09/30/2013     NSTEMI (non-ST elevated myocardial infarction) (H) 11/24/2020     Osteoarthritis     s/p right IRENE 11/26/19 at [facility]     S/P CABG x 5 2013    LIMA-LAD, SVG-PDA, SVG-PL, SVG-OM1, SVG- OM2 (9/2013)     Shortness of breath      Stable angina (H)      Status post coronary angiogram 09/21/2021     Unstable angina (H) 11/24/2020     Urinary retention        PAST SURGICAL HISTORY:  Past Surgical History:   Procedure Laterality Date     BYPASS GRAFT ARTERY CORONARY  9/23/2013    Procedure: BYPASS GRAFT ARTERY CORONARY;  CORONARY ARTERY BYPASS GRAFT X 5  WITH ENDOSCOPIC VEIN HARVESTING (LAD, PDA, SIMRAN, OM1 AND OM2 );  Surgeon: Kwadwo Canales MD;  Location: [facility] OR     COLONOSCOPY  10/2013    Shortly after bypass surgery     CORONARY ARTERY BYPASS  2013    LIMA  =LAD,svg=PDA,Svg=PLRca, Svg=Om1,Svg=OM2     CV CORONARY ANGIOGRAM N/A 9/21/2021    Procedure: Coronary Angiogram;  Surgeon: Armani Perez MD;  Location:  HEART CARDIAC CATH LAB     CV HEART CATHETERIZATION WITH POSSIBLE INTERVENTION N/A 11/24/2020    Procedure: Heart Catheterization with Possible Intervention;  Surgeon: William Collier MD;  Location:  HEART CARDIAC CATH LAB     NO HISTORY OF SURGERY       ORTHOPEDIC SURGERY      cortisone shot in back for pain       FAMILY HISTORY:  Family History   Problem Relation Age of Onset     Lipids Mother      Hypertension Mother      Hyperlipidemia Mother      Cancer Paternal Grandmother      Hypertension Father      Hyperlipidemia Father      C.A.D. No family hx of      Cerebrovascular Disease No family hx of        SOCIAL HISTORY:  Social History     Socioeconomic History     Marital status:      Spouse name: None     Number of children: None     Years of education: None     Highest education level: None   Occupational History     None   Tobacco Use     Smoking status: Never Smoker     Smokeless tobacco: Never Used   Substance and Sexual Activity     Alcohol use: Not Currently     Alcohol/week: 6.0 standard drinks     Drug use: No     Sexual activity: Yes     Partners: Female     Birth control/protection: None   Other Topics Concern      Service Not Asked     Blood Transfusions Not Asked     Caffeine Concern Yes     Comment: 4 cups coffee daily     Occupational Exposure Not Asked     Hobby Hazards Not Asked     Sleep Concern No     Stress Concern Not Asked     Weight Concern Not Asked     Special Diet Not Asked     Back Care Not Asked     Exercise Yes     Comment: daily walking, biking, golf      Bike Helmet Not Asked     Seat Belt Yes     Self-Exams Not Asked     Parent/sibling w/ CABG, MI or angioplasty before 65F 55M? No   Social History Narrative    , 1 daughter    Employed for [employer]  "[employer]     Financial Resource Strain: Not on file   Food Insecurity: Not on file   Transportation Needs: Not on file   Physical Activity: Not on file   Stress: Not on file   Social Connections: Not on file   Intimate Partner Violence: Not on file   Housing Stability: Not on file       Review of Systems:  Skin:  Negative       Eyes:  Negative      ENT:  Positive for hearing loss hearing aids in both ears  Respiratory:  Negative       Cardiovascular:  Negative      Gastroenterology: Negative      Genitourinary:  Negative      Musculoskeletal:  Negative      Neurologic:  Negative      Psychiatric:  Negative      Heme/Lymph/Imm:  Negative      Endocrine:  Negative        Physical Exam:  Vitals: /83   Pulse 52   Ht 1.803 m (5' 11\")   Wt 78.5 kg (173 lb)   SpO2 99%   BMI 24.13 kg/m      Constitutional:  cooperative;in no acute distress        Skin:  warm and dry to the touch          Head:  normocephalic        Eyes:  conjunctivae and lids unremarkable        Lymph:      ENT:  no pallor or cyanosis        Neck:  JVP normal;no carotid bruit        Respiratory:  clear to auscultation         Cardiac: regular rhythm;no murmurs, gallops or rubs detected                pulses full and equal                                        GI:  abdomen soft;non-tender        Extremities and Muscular Skeletal:  no edema              Neurological:  no gross motor deficits        Psych:  Alert and Oriented x 3          Armani Perez MD, MD   [facility]  "

## 2022-03-30 ENCOUNTER — OFFICE VISIT (OUTPATIENT)
Dept: FAMILY MEDICINE | Facility: CLINIC | Age: 67
End: 2022-03-30
Payer: COMMERCIAL

## 2022-03-30 VITALS
DIASTOLIC BLOOD PRESSURE: 71 MMHG | TEMPERATURE: 97.1 F | HEIGHT: 71 IN | HEART RATE: 61 BPM | RESPIRATION RATE: 16 BRPM | WEIGHT: 175 LBS | OXYGEN SATURATION: 100 % | SYSTOLIC BLOOD PRESSURE: 114 MMHG | BODY MASS INDEX: 24.5 KG/M2

## 2022-03-30 DIAGNOSIS — I77.810 ASCENDING AORTA DILATATION (H): ICD-10-CM

## 2022-03-30 DIAGNOSIS — I25.10 CORONARY ARTERY DISEASE INVOLVING NATIVE CORONARY ARTERY OF NATIVE HEART WITHOUT ANGINA PECTORIS: ICD-10-CM

## 2022-03-30 DIAGNOSIS — Z12.5 ENCOUNTER FOR SCREENING FOR MALIGNANT NEOPLASM OF PROSTATE: ICD-10-CM

## 2022-03-30 DIAGNOSIS — Z00.00 ROUTINE GENERAL MEDICAL EXAMINATION AT A HEALTH CARE FACILITY: Primary | ICD-10-CM

## 2022-03-30 DIAGNOSIS — M10.9 ACUTE GOUTY ARTHRITIS: ICD-10-CM

## 2022-03-30 LAB
ERYTHROCYTE [DISTWIDTH] IN BLOOD BY AUTOMATED COUNT: 14 % (ref 10–15)
HCT VFR BLD AUTO: 43.1 % (ref 40–53)
HGB BLD-MCNC: 15.5 G/DL (ref 13.3–17.7)
MCH RBC QN AUTO: 33 PG (ref 26.5–33)
MCHC RBC AUTO-ENTMCNC: 36 G/DL (ref 31.5–36.5)
MCV RBC AUTO: 92 FL (ref 78–100)
PLATELET # BLD AUTO: 184 10E3/UL (ref 150–450)
RBC # BLD AUTO: 4.69 10E6/UL (ref 4.4–5.9)
WBC # BLD AUTO: 3.9 10E3/UL (ref 4–11)

## 2022-03-30 PROCEDURE — 90750 HZV VACC RECOMBINANT IM: CPT | Performed by: INTERNAL MEDICINE

## 2022-03-30 PROCEDURE — G0438 PPPS, INITIAL VISIT: HCPCS | Performed by: INTERNAL MEDICINE

## 2022-03-30 PROCEDURE — G0103 PSA SCREENING: HCPCS | Performed by: INTERNAL MEDICINE

## 2022-03-30 PROCEDURE — 80053 COMPREHEN METABOLIC PANEL: CPT | Performed by: INTERNAL MEDICINE

## 2022-03-30 PROCEDURE — 90471 IMMUNIZATION ADMIN: CPT | Performed by: INTERNAL MEDICINE

## 2022-03-30 PROCEDURE — 36415 COLL VENOUS BLD VENIPUNCTURE: CPT | Performed by: INTERNAL MEDICINE

## 2022-03-30 PROCEDURE — 99214 OFFICE O/P EST MOD 30 MIN: CPT | Mod: 25 | Performed by: INTERNAL MEDICINE

## 2022-03-30 PROCEDURE — 85027 COMPLETE CBC AUTOMATED: CPT | Performed by: INTERNAL MEDICINE

## 2022-03-30 PROCEDURE — 80061 LIPID PANEL: CPT | Performed by: INTERNAL MEDICINE

## 2022-03-30 PROCEDURE — 84550 ASSAY OF BLOOD/URIC ACID: CPT | Performed by: INTERNAL MEDICINE

## 2022-03-30 RX ORDER — ALLOPURINOL 100 MG/1
100 TABLET ORAL DAILY
Qty: 90 TABLET | Refills: 3 | Status: SHIPPED | OUTPATIENT
Start: 2022-03-30 | End: 2023-03-20

## 2022-03-30 RX ORDER — COLCHICINE 0.6 MG/1
TABLET ORAL
Qty: 30 TABLET | Refills: 3 | Status: SHIPPED | OUTPATIENT
Start: 2022-03-30 | End: 2023-01-31

## 2022-03-30 ASSESSMENT — ENCOUNTER SYMPTOMS
FREQUENCY: 0
DIARRHEA: 0
HEMATOCHEZIA: 0
HEADACHES: 0
WEAKNESS: 0
ABDOMINAL PAIN: 0
COUGH: 0
SHORTNESS OF BREATH: 0
CONSTIPATION: 0
DYSURIA: 0
FEVER: 0
PARESTHESIAS: 0
NERVOUS/ANXIOUS: 0
PALPITATIONS: 0
CHILLS: 0
JOINT SWELLING: 0
HEARTBURN: 0
SORE THROAT: 0
MYALGIAS: 0
NAUSEA: 0
ARTHRALGIAS: 0
EYE PAIN: 0
DIZZINESS: 0
HEMATURIA: 0

## 2022-03-30 ASSESSMENT — ACTIVITIES OF DAILY LIVING (ADL): CURRENT_FUNCTION: NO ASSISTANCE NEEDED

## 2022-03-30 ASSESSMENT — PAIN SCALES - GENERAL: PAINLEVEL: NO PAIN (0)

## 2022-03-30 NOTE — PROGRESS NOTES
"SUBJECTIVE:   Odessa Elliott is a 66 year old male who presents for Preventive Visit.    Patient has been advised of split billing requirements and indicates understanding: Yes  Are you in the first 12 months of your Medicare coverage?  No    Healthy Habits:     In general, how would you rate your overall health?  Excellent    Frequency of exercise:  4-5 days/week    Duration of exercise:  Greater than 60 minutes    Do you usually eat at least 4 servings of fruit and vegetables a day, include whole grains    & fiber and avoid regularly eating high fat or \"junk\" foods?  Yes    Taking medications regularly:  Yes    Medication side effects:  None    Ability to successfully perform activities of daily living:  No assistance needed    Home Safety:  No safety concerns identified    Hearing Impairment:  No hearing concerns    In the past 6 months, have you been bothered by leaking of urine?  No    In general, how would you rate your overall mental or emotional health?  Excellent      PHQ-2 Total Score: 0    Do you feel safe in your environment? Yes    Have you ever done Advance Care Planning? (For example, a Health Directive, POLST, or a discussion with a medical provider or your loved ones about your wishes): Yes, patient states has an Advance Care Planning document and will bring a copy to the clinic.       Fall risk  Fallen 2 or more times in the past year?: No  Any fall with injury in the past year?: No    Cognitive Screening   1) Repeat 3 items (Leader, Season, Table)    2) Clock draw: NORMAL  3) 3 item recall: Recalls 3 objects  Results: 3 items recalled: COGNITIVE IMPAIRMENT LESS LIKELY    Mini-CogTM Copyright KATHIE Workman. Licensed by the author for use in [facility]; reprinted with permission (lily@.[facility]). All rights reserved.      Do you have sleep apnea, excessive snoring or daytime drowsiness?: no    Reviewed and updated as needed this visit by clinical staff   Tobacco  Allergies  Meds          "     Reviewed and updated as needed this visit by Provider                 Social History     Tobacco Use     Smoking status: Never Smoker     Smokeless tobacco: Never Used   Substance Use Topics     Alcohol use: Not Currently     Alcohol/week: 6.0 standard drinks     If you drink alcohol do you typically have >3 drinks per day or >7 drinks per week? Not applicable    Alcohol Use 3/30/2022   Prescreen: >3 drinks/day or >7 drinks/week? Not Applicable   Prescreen: >3 drinks/day or >7 drinks/week? -   No flowsheet data found.    Current providers sharing in care for this patient include:   Patient Care Team:  Mikie Lazaor MD as PCP - General (Internal Medicine)  Mikie Lazaro MD as Assigned PCP  Armani Perez MD as Assigned Heart and Vascular Provider  Law Melendrez EP as Cardiac Rehabilitation Therapist    The following health maintenance items are reviewed in Epic and correct as of today:  Health Maintenance Due   Topic Date Due     ANNUAL REVIEW OF HM ORDERS  Never done     ZOSTER IMMUNIZATION (1 of 2) Never done     AORTIC ANEURYSM SCREENING (SYSTEM ASSIGNED)  06/21/2019     CMP  10/29/2020     INFLUENZA VACCINE (1) 09/01/2021     LIPID  11/25/2021     FALL RISK ASSESSMENT  11/30/2021     PSA  12/15/2021     Lab work is in process  Labs reviewed in [facility]       Ascending aorta dilatation (H)  Coronary artery disease involving native coronary artery of native heart without angina pectoris   Odessa Elliott notes recent increase in does of Imdur.  No current chest pain.  No shortness of breath.  Has had follow up in cardiology and echocardiogram reviewed.  Has been biking, cross country skiing, snowboarding.  Acute gouty arthritis   Has not had any gout attacks recently.  Will pay close attention to diet and alcohol intake.      Review of Systems  Constitutional, HEENT, cardiovascular, pulmonary, GI, , musculoskeletal, neuro, skin, endocrine and psych systems are negative, except as otherwise  "noted.    OBJECTIVE:   /71 (BP Location: Right arm, Patient Position: Sitting, Cuff Size: Adult Regular)   Pulse 61   Temp 97.1  F (36.2  C) (Temporal)   Resp 16   Ht 1.803 m (5' 11\")   Wt 79.4 kg (175 lb)   SpO2 100%   BMI 24.41 kg/m   Estimated body mass index is 24.41 kg/m  as calculated from the following:    Height as of this encounter: 1.803 m (5' 11\").    Weight as of this encounter: 79.4 kg (175 lb).  Physical Exam  GENERAL: healthy, alert and no distress  EYES: Eyes grossly normal to inspection, PERRL and conjunctivae and sclerae normal  HENT: ear canals and TM's normal,    NECK: no adenopathy, no asymmetry, masses, or scars and thyroid normal to palpation  RESP: lungs clear to auscultation - no rales, rhonchi or wheezes  CV: regular rate and rhythm, normal S1 S2, no S3 or S4, no murmur, click or rub, no peripheral edema   ABDOMEN: soft, nontender, no hepatosplenomegaly, no masses and bowel sounds normal  MS: no gross musculoskeletal defects noted, no edema  SKIN: no suspicious lesions or rashes  NEURO: Normal strength and tone, mentation intact and speech normal  PSYCH: mentation appears normal, affect normal/bright    Diagnostic Test Results:  Labs reviewed in Epic    ASSESSMENT / PLAN:     Patient Instructions   (Z00.00) Routine general medical examination at a health care facility  (primary encounter diagnosis)  Comment: For routine exam, we will draw labs as ordered, cholesterol, diabetes mellitus check, liver function, renal function, PSA and refer for colonoscopy.  We will also update vaccination history.  Shingrix vaccine is now available.  I would call your insurance to see if a shingles vaccine is covered and get this at your pharmacy   Plan: REVIEW OF HEALTH MAINTENANCE PROTOCOL ORDERS,         COMPREHENSIVE METABOLIC PANEL, Lipid panel         reflex to direct LDL Fasting, PROSTATE SPEC         ANTIGEN SCREEN, CBC with platelets            (I77.810) Ascending aorta dilatation " "(H)  Comment: blood pressure is excellent - no changes.  I would recommend obtaining abdominal aortic aneurysm screening [facility] phone #[phone]   Plan:     (I25.10) Coronary artery disease involving native coronary artery of native heart without angina pectoris  Comment: Continue statin, aspirin, imdur and blood pressure metoprolol  Plan:     (M10.9) Acute gouty arthritis  Comment: We will check uric acid and resume allopurinol 100 mg daily   Plan: Uric acid, colchicine (COLCRYS) 0.6 MG tablet,         allopurinol (ZYLOPRIM) 100 MG tablet            (Z12.5) Encounter for screening for malignant neoplasm of prostate   Comment:   Plan: PROSTATE SPEC ANTIGEN SCREEN                  Patient has been advised of split billing requirements and indicates understanding: Yes    COUNSELING:  Reviewed preventive health counseling, as reflected in patient instructions    Estimated body mass index is 24.41 kg/m  as calculated from the following:    Height as of this encounter: 1.803 m (5' 11\").    Weight as of this encounter: 79.4 kg (175 lb).        He reports that he has never smoked. He has never used smokeless tobacco.      Appropriate preventive services were discussed with this patient, including applicable screening as appropriate for cardiovascular disease, diabetes, osteopenia/osteoporosis, and glaucoma.  As appropriate for age/gender, discussed screening for colorectal cancer, prostate cancer, breast cancer, and cervical cancer. Checklist reviewing preventive services available has been given to the patient.    Reviewed patients plan of care and provided an AVS. The Basic Care Plan (routine screening as documented in Health Maintenance) for Odessa meets the Care Plan requirement. This Care Plan has been established and reviewed with the Patient.    Counseling Resources:  ATP IV Guidelines  Pooled Cohorts Equation Calculator  Breast Cancer Risk Calculator  Breast Cancer: Medication to Reduce Risk  FRAX Risk " Assessment  ICSI Preventive Guidelines  Dietary Guidelines for Americans, 2010  USDA's MyPlate  ASA Prophylaxis  Lung CA Screening    Mikie Lazaro MD, MD  [facility]    Identified Health Risks:

## 2022-03-30 NOTE — NURSING NOTE
Prior to immunization administration, verified patients identity using patient s name and date of birth. Please see Immunization Activity for additional information.     Screening Questionnaire for Adult Immunization    Are you sick today?   No   Do you have allergies to medications, food, a vaccine component or latex?   No   Have you ever had a serious reaction after receiving a vaccination?   No   Do you have a long-term health problem with heart, lung, kidney, or metabolic disease (e.g., diabetes), asthma, a blood disorder, no spleen, complement component deficiency, a cochlear implant, or a spinal fluid leak?  Are you on long-term aspirin therapy?   Yes   Do you have cancer, leukemia, HIV/AIDS, or any other immune system problem?   No   Do you have a parent, brother, or sister with an immune system problem?   No   In the past 3 months, have you taken medications that affect  your immune system, such as prednisone, other steroids, or anticancer drugs; drugs for the treatment of rheumatoid arthritis, Crohn s disease, or psoriasis; or have you had radiation treatments?   No   Have you had a seizure, or a brain or other nervous system problem?   No   During the past year, have you received a transfusion of blood or blood    products, or been given immune (gamma) globulin or antiviral drug?   No   For women: Are you pregnant or is there a chance you could become       pregnant during the next month?   No   Have you received any vaccinations in the past 4 weeks?   No     Immunization questionnaire was positive for at least one answer.  Notified Dr Lazaro.        Per orders of Dr. Lazaro, injection of Shingrix given by Mayelin Plaza MA. Patient instructed to remain in clinic for 15 minutes afterwards, and to report any adverse reaction to me immediately.       Screening performed by Mayelin Plaza MA on 3/30/2022 at 3:11 PM.

## 2022-03-30 NOTE — PATIENT INSTRUCTIONS
(Z00.00) Routine general medical examination at a health care facility  (primary encounter diagnosis)  Comment: For routine exam, we will draw labs as ordered, cholesterol, diabetes mellitus check, liver function, renal function, PSA and refer for colonoscopy.  We will also update vaccination history.  Shingrix vaccine is now available.  I would call your insurance to see if a shingles vaccine is covered and get this at your pharmacy   Plan: REVIEW OF HEALTH MAINTENANCE PROTOCOL ORDERS,         COMPREHENSIVE METABOLIC PANEL, Lipid panel         reflex to direct LDL Fasting, PROSTATE SPEC         ANTIGEN SCREEN, CBC with platelets            (I77.810) Ascending aorta dilatation (H)  Comment: blood pressure is excellent - no changes.  I would recommend obtaining abdominal aortic aneurysm screening [facility] phone #[phone]   Plan:     (I25.10) Coronary artery disease involving native coronary artery of native heart without angina pectoris  Comment: Continue statin, aspirin, imdur and blood pressure metoprolol  Plan:     (M10.9) Acute gouty arthritis  Comment: We will check uric acid and resume allopurinol 100 mg daily   Plan: Uric acid, colchicine (COLCRYS) 0.6 MG tablet,         allopurinol (ZYLOPRIM) 100 MG tablet            (Z12.5) Encounter for screening for malignant neoplasm of prostate   Comment:   Plan: PROSTATE SPEC ANTIGEN SCREEN

## 2022-04-01 LAB
ALBUMIN SERPL-MCNC: 3.9 G/DL (ref 3.4–5)
ALP SERPL-CCNC: 85 U/L (ref 40–150)
ALT SERPL W P-5'-P-CCNC: 38 U/L (ref 0–70)
ANION GAP SERPL CALCULATED.3IONS-SCNC: 9 MMOL/L (ref 3–14)
AST SERPL W P-5'-P-CCNC: 39 U/L (ref 0–45)
BILIRUB SERPL-MCNC: 0.7 MG/DL (ref 0.2–1.3)
BUN SERPL-MCNC: 17 MG/DL (ref 7–30)
CALCIUM SERPL-MCNC: 9.3 MG/DL (ref 8.5–10.1)
CHLORIDE BLD-SCNC: 103 MMOL/L (ref 94–109)
CHOLEST SERPL-MCNC: 159 MG/DL
CO2 SERPL-SCNC: 25 MMOL/L (ref 20–32)
CREAT SERPL-MCNC: 0.89 MG/DL (ref 0.66–1.25)
FASTING STATUS PATIENT QL REPORTED: NO
GFR SERPL CREATININE-BSD FRML MDRD: >90 ML/MIN/1.73M2
GLUCOSE BLD-MCNC: 91 MG/DL (ref 70–99)
HDLC SERPL-MCNC: 62 MG/DL
LDLC SERPL CALC-MCNC: 76 MG/DL
NONHDLC SERPL-MCNC: 97 MG/DL
POTASSIUM BLD-SCNC: 4.6 MMOL/L (ref 3.4–5.3)
PROT SERPL-MCNC: 7.2 G/DL (ref 6.8–8.8)
PSA SERPL-MCNC: 1.57 UG/L (ref 0–4)
SODIUM SERPL-SCNC: 137 MMOL/L (ref 133–144)
TRIGL SERPL-MCNC: 105 MG/DL
URATE SERPL-MCNC: 4.9 MG/DL (ref 3.5–7.2)

## 2022-04-01 NOTE — RESULT ENCOUNTER NOTE
Dear Odessa,     I'm covering for Dr. Lazaro:    Here are your recent PSA results which are within the expected range. Please continue with your current plan of care and let us know if you have any questions or concerns.    Regards,  Maryjane Zarco PA-C

## 2022-04-01 NOTE — RESULT ENCOUNTER NOTE
Dear Odessa,     I am covering for Dr. Lazaro:    Here are your recent lipid panel results which are within the expected range. Please continue with your current plan of care and let us know if you have any questions or concerns.    Regards,  Maryjane Zarco PA-C  Goal Outcome Evaluation:  Plan of Care Reviewed With: patient  Progress: no change  Outcome Summary: Pt. disoriented x3, oriented to self. VSS. Pt. assist x2 and walker to bathroom, voiding normally w/ incontinent episodes @ times. Bruising and minor swelling still evident to face, nose, and eyes. Pt. c/o pain intermittently that is relieved with Toradol. Potassium @ 2.9 this AM, PO potassium ordered. Afrin and NS nasal spray given, oral care completed, and ice pack offered for swelling. Sitter @ bedside and bed alarm set. SCD's for VTE prevention. Safety maintained and continue to monitor.

## 2022-04-01 NOTE — RESULT ENCOUNTER NOTE
Dear Odessa,     I am covering for Dr. Lazaro:    Here are your some of your recent results which are within the expected range aside from your white blood cell count which is a little bit low but stable when compared to previous values.  Dr. Lazaro will see the results when he returns. Please continue with your current plan of care and let us know if you have any questions or concerns.    Regards,  Maryjane Zarco PA-C

## 2022-04-08 ENCOUNTER — HOSPITAL ENCOUNTER (OUTPATIENT)
Dept: ULTRASOUND IMAGING | Facility: CLINIC | Age: 67
Discharge: HOME | End: 2022-04-08
Attending: INTERNAL MEDICINE | Admitting: INTERNAL MEDICINE
Payer: COMMERCIAL

## 2022-04-08 DIAGNOSIS — I77.810 ASCENDING AORTA DILATATION (H): ICD-10-CM

## 2022-04-08 DIAGNOSIS — Z00.00 ROUTINE GENERAL MEDICAL EXAMINATION AT A HEALTH CARE FACILITY: ICD-10-CM

## 2022-04-08 PROCEDURE — 76706 US ABDL AORTA SCREEN AAA: CPT

## 2022-07-13 ENCOUNTER — ALLIED HEALTH/NURSE VISIT (OUTPATIENT)
Dept: FAMILY MEDICINE | Facility: CLINIC | Age: 67
End: 2022-07-13
Payer: COMMERCIAL

## 2022-07-13 DIAGNOSIS — Z23 NEED FOR VACCINATION: Primary | ICD-10-CM

## 2022-07-13 PROCEDURE — 90471 IMMUNIZATION ADMIN: CPT

## 2022-07-13 PROCEDURE — 99207 PR NO CHARGE NURSE ONLY: CPT

## 2022-07-13 PROCEDURE — 90750 HZV VACC RECOMBINANT IM: CPT

## 2022-07-13 NOTE — PROGRESS NOTES
Prior to immunization administration, verified patients identity using patient s name and date of birth. Please see Immunization Activity for additional information.     Screening Questionnaire for Adult Immunization    Are you sick today?   No   Do you have allergies to medications, food, a vaccine component or latex?   No   Have you ever had a serious reaction after receiving a vaccination?   No   Do you have a long-term health problem with heart, lung, kidney, or metabolic disease (e.g., diabetes), asthma, a blood disorder, no spleen, complement component deficiency, a cochlear implant, or a spinal fluid leak?  Are you on long-term aspirin therapy?   Yes   Do you have cancer, leukemia, HIV/AIDS, or any other immune system problem?   No   Do you have a parent, brother, or sister with an immune system problem?   No   In the past 3 months, have you taken medications that affect  your immune system, such as prednisone, other steroids, or anticancer drugs; drugs for the treatment of rheumatoid arthritis, Crohn s disease, or psoriasis; or have you had radiation treatments?   No   Have you had a seizure, or a brain or other nervous system problem?   No   During the past year, have you received a transfusion of blood or blood    products, or been given immune (gamma) globulin or antiviral drug?   No   For women: Are you pregnant or is there a chance you could become       pregnant during the next month?   No   Have you received any vaccinations in the past 4 weeks?   No     Immunization questionnaire was positive for at least one answer.  Notified Dr. Lazaro.        Per orders of Dr. Lazaro, injection of Shingrix #2 given by Rebekah Pagan MA. Patient instructed to remain in clinic for 15 minutes afterwards, and to report any adverse reaction to me immediately.     Patient verified  Screening performed by Rebekah Pagan MA on 7/13/2022 at 3:01 PM.

## 2022-08-04 ENCOUNTER — TELEPHONE (OUTPATIENT)
Dept: CARDIOLOGY | Facility: CLINIC | Age: 67
End: 2022-08-04

## 2022-08-04 DIAGNOSIS — I25.10 CORONARY ARTERY DISEASE INVOLVING NATIVE CORONARY ARTERY OF NATIVE HEART WITHOUT ANGINA PECTORIS: ICD-10-CM

## 2022-08-04 RX ORDER — METOPROLOL SUCCINATE 25 MG/1
12.5 TABLET, EXTENDED RELEASE ORAL DAILY
Qty: 45 TABLET | Refills: 0 | Status: SHIPPED | OUTPATIENT
Start: 2022-08-04 | End: 2022-09-14

## 2022-08-04 NOTE — TELEPHONE ENCOUNTER
[facility] Call Center    Phone Message    May a detailed message be left on voicemail: yes     Reason for Call: Medication Refill Request    Has the patient contacted the pharmacy for the refill? Yes   Name of medication being requested: metoprolol succinate ER (TOPROL-XL) 25 MG 24 hr tablet  Provider who prescribed the medication: Dr. Perez  Pharmacy: [facility] - [address]  Date medication is needed: 8/5/2022      Action Taken: Other: Cardiology    Travel Screening: Not Applicable

## 2022-09-14 ENCOUNTER — OFFICE VISIT (OUTPATIENT)
Dept: CARDIOLOGY | Facility: CLINIC | Age: 67
End: 2022-09-14
Attending: INTERNAL MEDICINE
Payer: COMMERCIAL

## 2022-09-14 VITALS
SYSTOLIC BLOOD PRESSURE: 115 MMHG | BODY MASS INDEX: 24.16 KG/M2 | HEART RATE: 54 BPM | DIASTOLIC BLOOD PRESSURE: 79 MMHG | HEIGHT: 71 IN | OXYGEN SATURATION: 98 % | WEIGHT: 172.6 LBS

## 2022-09-14 DIAGNOSIS — I25.10 CORONARY ARTERY DISEASE INVOLVING NATIVE CORONARY ARTERY OF NATIVE HEART WITHOUT ANGINA PECTORIS: ICD-10-CM

## 2022-09-14 DIAGNOSIS — I25.118 CORONARY ARTERY DISEASE OF NATIVE ARTERY OF NATIVE HEART WITH STABLE ANGINA PECTORIS (H): ICD-10-CM

## 2022-09-14 DIAGNOSIS — I20.89 STABLE ANGINA (H): ICD-10-CM

## 2022-09-14 DIAGNOSIS — I77.810 ASCENDING AORTA DILATATION (H): Primary | ICD-10-CM

## 2022-09-14 PROCEDURE — 99214 OFFICE O/P EST MOD 30 MIN: CPT | Performed by: INTERNAL MEDICINE

## 2022-09-14 RX ORDER — METOPROLOL SUCCINATE 25 MG/1
12.5 TABLET, EXTENDED RELEASE ORAL DAILY
Qty: 45 TABLET | Refills: 0 | Status: SHIPPED | OUTPATIENT
Start: 2022-09-14 | End: 2023-01-24

## 2022-09-14 RX ORDER — LISINOPRIL 2.5 MG/1
2.5 TABLET ORAL DAILY
Qty: 90 TABLET | Refills: 3 | Status: SHIPPED | OUTPATIENT
Start: 2022-09-14 | End: 2024-01-10

## 2022-09-14 RX ORDER — ATORVASTATIN CALCIUM 80 MG/1
80 TABLET, FILM COATED ORAL DAILY
Qty: 90 TABLET | Refills: 3 | Status: SHIPPED | OUTPATIENT
Start: 2022-09-14 | End: 2022-09-16

## 2022-09-14 RX ORDER — ISOSORBIDE MONONITRATE 30 MG/1
90 TABLET, EXTENDED RELEASE ORAL DAILY
Qty: 90 TABLET | Refills: 0 | Status: SHIPPED | OUTPATIENT
Start: 2022-09-14 | End: 2022-12-29

## 2022-09-14 NOTE — LETTER
9/14/2022    Mikie Lazaro MD, MD  [address] [facility] [address]  [facility] [address]    RE: Odessa Elliott       Dear Colleague,     I had the pleasure of seeing Odessa Elliott in the [facility].  REASON FOR CONSULTATION:  Followup for coronary artery disease and chronic stable angina.    HISTORY OF PRESENT ILLNESS:  I had the pleasure of seeing Odessa Elliott at [facility] in [place] this morning.  He is a very pleasant, 67-year-old gentleman who is well known to me.  He has known coronary artery disease and hyperlipidemia.  In 2013, we evaluated him for unstable angina.  Coronary angiogram at that time revealed severe 3 vessel coronary artery disease.  He subsequently underwent coronary artery bypass grafting surgery x5 with a LIMA to LAD, vein graft to the right posterior descending artery, vein graft to the right posterolateral branch, vein graft to the obtuse marginal 1 and vein graft to the obtuse marginal branch 2.  He did well the years following his surgery.    In October of 2020, he started noticing exertional chest burning.  The episodes were initially brought on by strenuous activity, but subsequently came on with his usual walks.  His wife who is a nurse was not aware of his symptoms.  When he mentioned those symptoms, she brought him to the emergency department.  This was in November of 2020.  His troponin was mildly elevated without significant delta.  He then proceeded to have coronary and graft angiography, which I have personally reviewed.  The angiogram revealed severe native vessel coronary artery disease as expected.  His LIMA to the LAD was widely patent.  His SVG to RCA was also widely patent.  The SVG to the right posterolateral branch was patent, although the graft was small.  The vein graft to the first obtuse marginal branch was patent, and the SVG to the second obtuse marginal branch was patent.  His RCA was chronically occluded.  His left main had  moderate to severe disease, and the proximal LAD, which supplied a moderate-sized diagonal branch, had 95% calcific stenosis.    After his angiogram, the patient was commenced on medical therapy.  He was initially on Imdur 30 mg daily with improved symptoms.  Subsequently, I saw him in February 2021.  At that time he was still complaining of chest pain with exertion.  I then increased his Imdur to 60 mg daily.  He mentioned stable but ongoing chest discomfort on our next visit which was August 2021.  He subsequent proceeded to have repeat angiogram in September.  An attempt to stent the diagonal branch was unsuccessful.  The lesion was heavily calcified.  Were able to wire it but could not cross it with a balloon.  Given the size of the vessel and the potential need of atherectomy we took the patient off the table.  I then saw him in November to discuss escalation of medical therapy versus complex intervention.  We elected to increase his Imdur to 90 mg daily.  Since then his symptoms have dramatically improved.  He notices occasional chest tightness with high workload.  No symptoms with his usual activities.        IMPRESSION AND PLAN:    1.  Chronic stable angina.  2.  Coronary artery disease, status post prior CABG x5 in 09/2013.  3.  Hyperlipidemia.  4.  Moderate ascending aortic dilatation    Mr. Elliott's stable angina symptoms have significantly improved after increasing the Imdur dose.  He now only gets mild chest discomfort with high workloads. He has been using additional 30 mg dose on occasional days with further improved symptoms.  Again reviewed indications for revascularization.  I told the patient that going after that diagonal branch which is heavily calcified will not prevent future heart attacks or improve survival.  As such, I would recommend continued medical therapy.  If his symptoms become worse or unacceptable then we will proceed with atherectomy and stenting of the diagonal branch.  The  patient is very much in agreement with this plan.    His aortic root is dilated per last echocardiogram.  We will repeat another echocardiogram in 3 months. His risk factors including his blood pressure well controlled.    I appreciate the opportunity to participate in his care.    Armani Perez MD      Today's clinic visit entailed:  30 minutes spent on the date of the encounter doing chart review, history and exam, documentation and further activities per the note  Provider  Link to [facility] Help Grid       Orders Placed This Encounter   Procedures     Follow-Up with Cardiology     Echocardiogram Complete       Orders Placed This Encounter   Medications     isosorbide mononitrate (IMDUR) 30 MG 24 hr tablet     Sig: Take 3 tablets (90 mg) by mouth daily     Dispense:  90 tablet     Refill:  0     metoprolol succinate ER (TOPROL XL) 25 MG 24 hr tablet     Sig: Take 0.5 tablets (12.5 mg) by mouth daily Appointment needed for refills. Please call [phone] to schedule.     Dispense:  45 tablet     Refill:  0     lisinopril (ZESTRIL) 2.5 MG tablet     Sig: Take 1 tablet (2.5 mg) by mouth daily     Dispense:  90 tablet     Refill:  3     atorvastatin (LIPITOR) 80 MG tablet     Sig: Take 1 tablet (80 mg) by mouth daily     Dispense:  90 tablet     Refill:  3       Medications Discontinued During This Encounter   Medication Reason     atorvastatin (LIPITOR) 80 MG tablet Reorder     lisinopril (ZESTRIL) 2.5 MG tablet Reorder     isosorbide mononitrate (IMDUR) 30 MG 24 hr tablet Reorder     metoprolol succinate ER (TOPROL XL) 25 MG 24 hr tablet Reorder         Encounter Diagnoses   Name Primary?     Stable angina (H)      Ascending aorta dilatation (H) Yes     Coronary artery disease of native artery of native heart with stable angina pectoris (H)      Coronary artery disease involving native coronary artery of native heart without angina pectoris        CURRENT MEDICATIONS:  Current Outpatient Medications   Medication Sig  Dispense Refill     allopurinol (ZYLOPRIM) 100 MG tablet Take 1 tablet (100 mg) by mouth daily 90 tablet 3     aspirin 81 MG tablet Take 81 mg by mouth daily       atorvastatin (LIPITOR) 80 MG tablet Take 1 tablet (80 mg) by mouth daily 90 tablet 3     colchicine (COLCRYS) 0.6 MG tablet Take 2 tabs (1.2mg) by mouth at the first sign of a gout attack, then 1 tab (0.6mg) 1 hour later.  Max 3 tabs (1.8mg) over 1 hour. 30 tablet 3     isosorbide mononitrate (IMDUR) 30 MG 24 hr tablet Take 3 tablets (90 mg) by mouth daily 90 tablet 0     lisinopril (ZESTRIL) 2.5 MG tablet Take 1 tablet (2.5 mg) by mouth daily 90 tablet 3     metoprolol succinate ER (TOPROL XL) 25 MG 24 hr tablet Take 0.5 tablets (12.5 mg) by mouth daily Appointment needed for refills. Please call [phone] to schedule. 45 tablet 0     Multiple Vitamin (MULTI VITAMIN DAILY PO) Take 1 tablet by mouth daily       nitroGLYcerin (NITROSTAT) 0.4 MG sublingual tablet For chest pain place 1 tablet under the tongue every 5 minutes for 3 doses. If symptoms persist 5 minutes after 1st dose call [phone]. 30 tablet 0     NONFORMULARY Take by mouth daily Collagen 1 scoop       UNKNOWN TO PATIENT Turmeric       vitamin C (ASCORBIC ACID) 1000 MG TABS Take 1,000 mg by mouth daily        Vitamin D, Cholecalciferol, 1000 UNITS TABS Take 1,000 Units by mouth daily          ALLERGIES     Allergies   Allergen Reactions     No Known Allergies        PAST MEDICAL HISTORY:  Past Medical History:   Diagnosis Date     Acute gouty arthritis      Aortic root dilatation (H)      Ascending aorta dilatation (H)      Chest pain      Coronary artery disease      Gout 11/24/2020     Hyperlipidaemia      Lower GI bleed 09/30/2013     NSTEMI (non-ST elevated myocardial infarction) (H) 11/24/2020     Osteoarthritis     s/p right IRENE 11/26/19 at [facility]     S/P CABG x 5 2013    LIMA-LAD, SVG-PDA, SVG-PL, SVG-OM1, SVG- OM2 (9/2013)     Shortness of breath      Stable angina (H)       Status post coronary angiogram 09/21/2021     Unstable angina (H) 11/24/2020     Urinary retention        PAST SURGICAL HISTORY:  Past Surgical History:   Procedure Laterality Date     BYPASS GRAFT ARTERY CORONARY  9/23/2013    Procedure: BYPASS GRAFT ARTERY CORONARY;  CORONARY ARTERY BYPASS GRAFT X 5  WITH ENDOSCOPIC VEIN HARVESTING (LAD, PDA, SIMRAN, OM1 AND OM2 );  Surgeon: Kwadwo Canales MD;  Location:  OR     COLONOSCOPY  10/2013    Shortly after bypass surgery     CORONARY ARTERY BYPASS  2013    LIMA =LAD,svg=PDA,Svg=PLRca, Svg=Om1,Svg=OM2     CV CORONARY ANGIOGRAM N/A 9/21/2021    Procedure: Coronary Angiogram;  Surgeon: Armani Perez MD;  Location:  HEART CARDIAC CATH LAB     CV HEART CATHETERIZATION WITH POSSIBLE INTERVENTION N/A 11/24/2020    Procedure: Heart Catheterization with Possible Intervention;  Surgeon: William Collier MD;  Location:  HEART CARDIAC CATH LAB     NO HISTORY OF SURGERY       ORTHOPEDIC SURGERY      cortisone shot in back for pain       FAMILY HISTORY:  Family History   Problem Relation Age of Onset     Lipids Mother      Hypertension Mother      Hyperlipidemia Mother      Cancer Paternal Grandmother      Hypertension Father      Hyperlipidemia Father      C.A.D. No family hx of      Cerebrovascular Disease No family hx of        SOCIAL HISTORY:  Social History     Socioeconomic History     Marital status:      Spouse name: None     Number of children: None     Years of education: None     Highest education level: None   Tobacco Use     Smoking status: Never Smoker     Smokeless tobacco: Never Used   Substance and Sexual Activity     Alcohol use: Not Currently     Alcohol/week: 6.0 standard drinks     Drug use: No     Sexual activity: Yes     Partners: Female     Birth control/protection: None     Comment: Recent occ. of gout in left foot-colcrys & indomethacin   Other Topics Concern     Caffeine Concern Yes     Comment: 4 cups coffee daily     Sleep Concern No  "    Exercise Yes     Comment: daily walking, biking, golf      Seat Belt Yes     Parent/sibling w/ CABG, MI or angioplasty before 65F 55M? No   Social History Narrative    , 1 daughter    Employed for [employer]       Review of Systems:  Skin:          Eyes:         ENT:         Respiratory:  Positive for dyspnea on exertion when over exertion   Cardiovascular:  Negative;palpitations;chest pain;edema;fatigue;dizziness;lightheadedness      Gastroenterology:        Genitourinary:         Musculoskeletal:         Neurologic:         Psychiatric:         Heme/Lymph/Imm:         Endocrine:           Physical Exam:  Vitals: /79   Pulse 54   Ht 1.803 m (5' 11\")   Wt 78.3 kg (172 lb 9.6 oz)   SpO2 98%   BMI 24.07 kg/m      Constitutional:  cooperative;in no acute distress        Skin:  warm and dry to the touch          Head:  normocephalic        Eyes:  conjunctivae and lids unremarkable        Lymph:      ENT:  no pallor or cyanosis        Neck:  JVP normal;no carotid bruit        Respiratory:  clear to auscultation         Cardiac: regular rhythm;no murmurs, gallops or rubs detected                pulses full and equal                                        GI:  abdomen soft;non-tender        Extremities and Muscular Skeletal:  no edema              Neurological:  no gross motor deficits        Psych:  Alert and Oriented x 3        CC  Armani Perez MD  [address]  [place] [address]    Thank you for allowing me to participate in the care of your patient.      Sincerely,     Armani Perez MD, MD     [facility]  "

## 2022-09-14 NOTE — PROGRESS NOTES
REASON FOR CONSULTATION:  Followup for coronary artery disease and chronic stable angina.    HISTORY OF PRESENT ILLNESS:  I had the pleasure of seeing Odessa Elliott at [facility] in [place] this morning.  He is a very pleasant, 67-year-old gentleman who is well known to me.  He has known coronary artery disease and hyperlipidemia.  In 2013, we evaluated him for unstable angina.  Coronary angiogram at that time revealed severe 3 vessel coronary artery disease.  He subsequently underwent coronary artery bypass grafting surgery x5 with a LIMA to LAD, vein graft to the right posterior descending artery, vein graft to the right posterolateral branch, vein graft to the obtuse marginal 1 and vein graft to the obtuse marginal branch 2.  He did well the years following his surgery.    In October of 2020, he started noticing exertional chest burning.  The episodes were initially brought on by strenuous activity, but subsequently came on with his usual walks.  His wife who is a nurse was not aware of his symptoms.  When he mentioned those symptoms, she brought him to the emergency department.  This was in November of 2020.  His troponin was mildly elevated without significant delta.  He then proceeded to have coronary and graft angiography, which I have personally reviewed.  The angiogram revealed severe native vessel coronary artery disease as expected.  His LIMA to the LAD was widely patent.  His SVG to RCA was also widely patent.  The SVG to the right posterolateral branch was patent, although the graft was small.  The vein graft to the first obtuse marginal branch was patent, and the SVG to the second obtuse marginal branch was patent.  His RCA was chronically occluded.  His left main had moderate to severe disease, and the proximal LAD, which supplied a moderate-sized diagonal branch, had 95% calcific stenosis.    After his angiogram, the patient was commenced on medical therapy.  He was initially  on Imdur 30 mg daily with improved symptoms.  Subsequently, I saw him in February 2021.  At that time he was still complaining of chest pain with exertion.  I then increased his Imdur to 60 mg daily.  He mentioned stable but ongoing chest discomfort on our next visit which was August 2021.  He subsequent proceeded to have repeat angiogram in September.  An attempt to stent the diagonal branch was unsuccessful.  The lesion was heavily calcified.  Were able to wire it but could not cross it with a balloon.  Given the size of the vessel and the potential need of atherectomy we took the patient off the table.  I then saw him in November to discuss escalation of medical therapy versus complex intervention.  We elected to increase his Imdur to 90 mg daily.  Since then his symptoms have dramatically improved.  He notices occasional chest tightness with high workload.  No symptoms with his usual activities.        IMPRESSION AND PLAN:    1.  Chronic stable angina.  2.  Coronary artery disease, status post prior CABG x5 in 09/2013.  3.  Hyperlipidemia.  4.  Moderate ascending aortic dilatation    Mr. Elliott's stable angina symptoms have significantly improved after increasing the Imdur dose.  He now only gets mild chest discomfort with high workloads. He has been using additional 30 mg dose on occasional days with further improved symptoms.  Again reviewed indications for revascularization.  I told the patient that going after that diagonal branch which is heavily calcified will not prevent future heart attacks or improve survival.  As such, I would recommend continued medical therapy.  If his symptoms become worse or unacceptable then we will proceed with atherectomy and stenting of the diagonal branch.  The patient is very much in agreement with this plan.    His aortic root is dilated per last echocardiogram.  We will repeat another echocardiogram in 3 months. His risk factors including his blood pressure well  controlled.    I appreciate the opportunity to participate in his care.    Armani Perez MD      Today's clinic visit entailed:  30 minutes spent on the date of the encounter doing chart review, history and exam, documentation and further activities per the note  Provider  Link to [facility] Help Grid       Orders Placed This Encounter   Procedures     Follow-Up with Cardiology     Echocardiogram Complete       Orders Placed This Encounter   Medications     isosorbide mononitrate (IMDUR) 30 MG 24 hr tablet     Sig: Take 3 tablets (90 mg) by mouth daily     Dispense:  90 tablet     Refill:  0     metoprolol succinate ER (TOPROL XL) 25 MG 24 hr tablet     Sig: Take 0.5 tablets (12.5 mg) by mouth daily Appointment needed for refills. Please call [phone] to schedule.     Dispense:  45 tablet     Refill:  0     lisinopril (ZESTRIL) 2.5 MG tablet     Sig: Take 1 tablet (2.5 mg) by mouth daily     Dispense:  90 tablet     Refill:  3     atorvastatin (LIPITOR) 80 MG tablet     Sig: Take 1 tablet (80 mg) by mouth daily     Dispense:  90 tablet     Refill:  3       Medications Discontinued During This Encounter   Medication Reason     atorvastatin (LIPITOR) 80 MG tablet Reorder     lisinopril (ZESTRIL) 2.5 MG tablet Reorder     isosorbide mononitrate (IMDUR) 30 MG 24 hr tablet Reorder     metoprolol succinate ER (TOPROL XL) 25 MG 24 hr tablet Reorder         Encounter Diagnoses   Name Primary?     Stable angina (H)      Ascending aorta dilatation (H) Yes     Coronary artery disease of native artery of native heart with stable angina pectoris (H)      Coronary artery disease involving native coronary artery of native heart without angina pectoris        CURRENT MEDICATIONS:  Current Outpatient Medications   Medication Sig Dispense Refill     allopurinol (ZYLOPRIM) 100 MG tablet Take 1 tablet (100 mg) by mouth daily 90 tablet 3     aspirin 81 MG tablet Take 81 mg by mouth daily       atorvastatin (LIPITOR) 80 MG tablet Take 1  tablet (80 mg) by mouth daily 90 tablet 3     colchicine (COLCRYS) 0.6 MG tablet Take 2 tabs (1.2mg) by mouth at the first sign of a gout attack, then 1 tab (0.6mg) 1 hour later.  Max 3 tabs (1.8mg) over 1 hour. 30 tablet 3     isosorbide mononitrate (IMDUR) 30 MG 24 hr tablet Take 3 tablets (90 mg) by mouth daily 90 tablet 0     lisinopril (ZESTRIL) 2.5 MG tablet Take 1 tablet (2.5 mg) by mouth daily 90 tablet 3     metoprolol succinate ER (TOPROL XL) 25 MG 24 hr tablet Take 0.5 tablets (12.5 mg) by mouth daily Appointment needed for refills. Please call [phone] to schedule. 45 tablet 0     Multiple Vitamin (MULTI VITAMIN DAILY PO) Take 1 tablet by mouth daily       nitroGLYcerin (NITROSTAT) 0.4 MG sublingual tablet For chest pain place 1 tablet under the tongue every 5 minutes for 3 doses. If symptoms persist 5 minutes after 1st dose call 911. 30 tablet 0     NONFORMULARY Take by mouth daily Collagen 1 scoop       UNKNOWN TO PATIENT Turmeric       vitamin C (ASCORBIC ACID) 1000 MG TABS Take 1,000 mg by mouth daily        Vitamin D, Cholecalciferol, 1000 UNITS TABS Take 1,000 Units by mouth daily          ALLERGIES     Allergies   Allergen Reactions     No Known Allergies        PAST MEDICAL HISTORY:  Past Medical History:   Diagnosis Date     Acute gouty arthritis      Aortic root dilatation (H)      Ascending aorta dilatation (H)      Chest pain      Coronary artery disease      Gout 11/24/2020     Hyperlipidaemia      Lower GI bleed 09/30/2013     NSTEMI (non-ST elevated myocardial infarction) (H) 11/24/2020     Osteoarthritis     s/p right IRENE 11/26/19 at [facility]     S/P CABG x 5 2013    LIMA-LAD, SVG-PDA, SVG-PL, SVG-OM1, SVG- OM2 (9/2013)     Shortness of breath      Stable angina (H)      Status post coronary angiogram 09/21/2021     Unstable angina (H) 11/24/2020     Urinary retention        PAST SURGICAL HISTORY:  Past Surgical History:   Procedure Laterality Date     BYPASS GRAFT ARTERY  CORONARY  9/23/2013    Procedure: BYPASS GRAFT ARTERY CORONARY;  CORONARY ARTERY BYPASS GRAFT X 5  WITH ENDOSCOPIC VEIN HARVESTING (LAD, PDA, SIMRAN, OM1 AND OM2 );  Surgeon: Kwadwo Canales MD;  Location:  OR     COLONOSCOPY  10/2013    Shortly after bypass surgery     CORONARY ARTERY BYPASS  2013    LIMA =LAD,svg=PDA,Svg=PLRca, Svg=Om1,Svg=OM2     CV CORONARY ANGIOGRAM N/A 9/21/2021    Procedure: Coronary Angiogram;  Surgeon: Armani Perez MD;  Location:  HEART CARDIAC CATH LAB     CV HEART CATHETERIZATION WITH POSSIBLE INTERVENTION N/A 11/24/2020    Procedure: Heart Catheterization with Possible Intervention;  Surgeon: William Collier MD;  Location:  HEART CARDIAC CATH LAB     NO HISTORY OF SURGERY       ORTHOPEDIC SURGERY      cortisone shot in back for pain       FAMILY HISTORY:  Family History   Problem Relation Age of Onset     Lipids Mother      Hypertension Mother      Hyperlipidemia Mother      Cancer Paternal Grandmother      Hypertension Father      Hyperlipidemia Father      C.A.D. No family hx of      Cerebrovascular Disease No family hx of        SOCIAL HISTORY:  Social History     Socioeconomic History     Marital status:      Spouse name: None     Number of children: None     Years of education: None     Highest education level: None   Tobacco Use     Smoking status: Never Smoker     Smokeless tobacco: Never Used   Substance and Sexual Activity     Alcohol use: Not Currently     Alcohol/week: 6.0 standard drinks     Drug use: No     Sexual activity: Yes     Partners: Female     Birth control/protection: None     Comment: Recent occ. of gout in left foot-colcrys & indomethacin   Other Topics Concern     Caffeine Concern Yes     Comment: 4 cups coffee daily     Sleep Concern No     Exercise Yes     Comment: daily walking, biking, golf      Seat Belt Yes     Parent/sibling w/ CABG, MI or angioplasty before 65F 55M? No   Social History Narrative    , 1 daughter    Employed for  "[employer]       Review of Systems:  Skin:          Eyes:         ENT:         Respiratory:  Positive for dyspnea on exertion when over exertion   Cardiovascular:  Negative;palpitations;chest pain;edema;fatigue;dizziness;lightheadedness      Gastroenterology:        Genitourinary:         Musculoskeletal:         Neurologic:         Psychiatric:         Heme/Lymph/Imm:         Endocrine:           Physical Exam:  Vitals: /79   Pulse 54   Ht 1.803 m (5' 11\")   Wt 78.3 kg (172 lb 9.6 oz)   SpO2 98%   BMI 24.07 kg/m      Constitutional:  cooperative;in no acute distress        Skin:  warm and dry to the touch          Head:  normocephalic        Eyes:  conjunctivae and lids unremarkable        Lymph:      ENT:  no pallor or cyanosis        Neck:  JVP normal;no carotid bruit        Respiratory:  clear to auscultation         Cardiac: regular rhythm;no murmurs, gallops or rubs detected                pulses full and equal                                        GI:  abdomen soft;non-tender        Extremities and Muscular Skeletal:  no edema              Neurological:  no gross motor deficits        Psych:  Alert and Oriented x 3        CC  Armani Perez MD  [address]  [place] [id number]              "

## 2022-09-16 DIAGNOSIS — I25.10 CORONARY ARTERY DISEASE INVOLVING NATIVE CORONARY ARTERY OF NATIVE HEART WITHOUT ANGINA PECTORIS: ICD-10-CM

## 2022-09-16 RX ORDER — ATORVASTATIN CALCIUM 80 MG/1
80 TABLET, FILM COATED ORAL DAILY
Qty: 90 TABLET | Refills: 3 | Status: SHIPPED | OUTPATIENT
Start: 2022-09-16 | End: 2023-09-11

## 2022-09-16 NOTE — TELEPHONE ENCOUNTER
Received refill request for Atorvastatin 80mg.    [facility] Refill Guideline reviewed.    Last OV 9\14\22  Dr. Perez  Last labs 3\30\22  Meets criteria  Refill request approved.    Constance Chaudhary RN on 9/16/2022 at 4:02 PM

## 2022-10-13 NOTE — TELEPHONE ENCOUNTER
Routing refill request to provider for review/approval because:  Previously prescribed by: MD Raisa Eid RN     Secondary Intention Text (Leave Blank If You Do Not Want): The defect will heal with secondary intention.

## 2022-11-10 ENCOUNTER — HOSPITAL ENCOUNTER (OUTPATIENT)
Facility: CLINIC | Age: 67
Discharge: HOME | End: 2022-11-10
Attending: INTERNAL MEDICINE | Admitting: INTERNAL MEDICINE
Payer: COMMERCIAL

## 2022-11-20 ENCOUNTER — HEALTH MAINTENANCE LETTER (OUTPATIENT)
Age: 67
End: 2022-11-20

## 2022-11-28 ENCOUNTER — TELEPHONE (OUTPATIENT)
Dept: CARDIOLOGY | Facility: CLINIC | Age: 67
End: 2022-11-28

## 2022-11-28 ENCOUNTER — HOSPITAL ENCOUNTER (OUTPATIENT)
Dept: CARDIOLOGY | Facility: CLINIC | Age: 67
Discharge: HOME | End: 2022-11-28
Attending: INTERNAL MEDICINE
Payer: COMMERCIAL

## 2022-11-28 DIAGNOSIS — I77.810 ASCENDING AORTA DILATATION (H): ICD-10-CM

## 2022-11-28 DIAGNOSIS — I25.10 CORONARY ARTERY DISEASE INVOLVING NATIVE CORONARY ARTERY OF NATIVE HEART WITHOUT ANGINA PECTORIS: ICD-10-CM

## 2022-11-28 DIAGNOSIS — I77.810 ASCENDING AORTA DILATATION (H): Primary | ICD-10-CM

## 2022-11-28 LAB — LVEF ECHO: NORMAL

## 2022-11-28 PROCEDURE — 93306 TTE W/DOPPLER COMPLETE: CPT

## 2022-11-28 PROCEDURE — 93306 TTE W/DOPPLER COMPLETE: CPT | Mod: 26 | Performed by: INTERNAL MEDICINE

## 2022-11-28 NOTE — TELEPHONE ENCOUNTER
Pt last had visit with Dr. Perez on 9/14/22 and per the note :  IMPRESSION AND PLAN:    1.  Chronic stable angina.  2.  Coronary artery disease, status post prior CABG x5 in 09/2013.  3.  Hyperlipidemia.  4.  Moderate ascending aortic dilatation     Mr. Elliott's stable angina symptoms have significantly improved after increasing the Imdur dose.  He now only gets mild chest discomfort with high workloads. He has been using additional 30 mg dose on occasional days with further improved symptoms.  Again reviewed indications for revascularization.  I told the patient that going after that diagonal branch which is heavily calcified will not prevent future heart attacks or improve survival.  As such, I would recommend continued medical therapy.  If his symptoms become worse or unacceptable then we will proceed with atherectomy and stenting of the diagonal branch.  The patient is very much in agreement with this plan.     His aortic root is dilated per last echocardiogram.  We will repeat another echocardiogram in 3 months. His risk factors including his blood pressure well controlled.  ---------------------------------------------------------------------------------------------------------    Pt had echo done on 11/28/22 that showed :  Interpretation Summary   The ascending aorta is Mildly dilated, 4.1 cm, 3.8 cm in most recent study.  Moderate aortic root dilatation, 4.6 cm. Unchanged compared with most recent  study.

## 2022-12-01 NOTE — TELEPHONE ENCOUNTER
Armani Perez MD Haley, Leandra K, RN  Caller: Unspecified (3 days ago,  4:52 PM)  Yes repeat echo in one year. Thanks

## 2022-12-29 DIAGNOSIS — I25.118 CORONARY ARTERY DISEASE OF NATIVE ARTERY OF NATIVE HEART WITH STABLE ANGINA PECTORIS (H): ICD-10-CM

## 2022-12-29 RX ORDER — ISOSORBIDE MONONITRATE 30 MG/1
90 TABLET, EXTENDED RELEASE ORAL DAILY
Qty: 270 TABLET | Refills: 2 | Status: SHIPPED | OUTPATIENT
Start: 2022-12-29 | End: 2023-09-26

## 2023-01-20 ENCOUNTER — TELEPHONE (OUTPATIENT)
Dept: CARDIOLOGY | Facility: CLINIC | Age: 68
End: 2023-01-20
Payer: COMMERCIAL

## 2023-01-24 RX ORDER — METOPROLOL SUCCINATE 25 MG/1
12.5 TABLET, EXTENDED RELEASE ORAL DAILY
Qty: 45 TABLET | Refills: 3 | Status: SHIPPED | OUTPATIENT
Start: 2023-01-24 | End: 2024-04-01

## 2023-03-19 DIAGNOSIS — M10.9 ACUTE GOUTY ARTHRITIS: ICD-10-CM

## 2023-03-20 RX ORDER — ALLOPURINOL 100 MG/1
TABLET ORAL
Qty: 90 TABLET | Refills: 0 | Status: SHIPPED | OUTPATIENT
Start: 2023-03-20 | End: 2023-06-19

## 2023-04-24 ASSESSMENT — ENCOUNTER SYMPTOMS
WEAKNESS: 0
DYSURIA: 0
NAUSEA: 0
DIARRHEA: 0
HEMATURIA: 0
ABDOMINAL PAIN: 0
FEVER: 0
SORE THROAT: 0
FREQUENCY: 0
HEARTBURN: 0
MYALGIAS: 0
HEMATOCHEZIA: 0
CHILLS: 0
DIZZINESS: 0
COUGH: 0
CONSTIPATION: 0
EYE PAIN: 0
JOINT SWELLING: 0
ARTHRALGIAS: 0
HEADACHES: 0
PARESTHESIAS: 0
PALPITATIONS: 0
SHORTNESS OF BREATH: 0
NERVOUS/ANXIOUS: 0

## 2023-04-24 ASSESSMENT — ACTIVITIES OF DAILY LIVING (ADL): CURRENT_FUNCTION: NO ASSISTANCE NEEDED

## 2023-04-24 NOTE — PROGRESS NOTES
"SUBJECTIVE:   [place] is a 67 year old who presents for Preventive Visit.  Patient has been advised of split billing requirements and indicates understanding: Yes  Are you in the first 12 months of your Medicare coverage?  No    Healthy Habits:     In general, how would you rate your overall health?  Excellent    Frequency of exercise:  4-5 days/week    Duration of exercise:  Greater than 60 minutes    Do you usually eat at least 4 servings of fruit and vegetables a day, include whole grains    & fiber and avoid regularly eating high fat or \"junk\" foods?  Yes    Taking medications regularly:  Yes    Ability to successfully perform activities of daily living:  No assistance needed    Home Safety:  No safety concerns identified    Hearing Impairment:  Difficulty following a conversation in a noisy restaurant or crowded room and need to ask people to speak up or repeat themselves    In the past 6 months, have you been bothered by leaking of urine?  No    In general, how would you rate your overall mental or emotional health?  Excellent      PHQ-2 Total Score: 0    Additional concerns today:  No      Have you ever done Advance Care Planning? (For example, a Health Directive, POLST, or a discussion with a medical provider or your loved ones about your wishes): pt will send copy once complete.     Fall risk  Fallen 2 or more times in the past year?: No  Any fall with injury in the past year?: No    Cognitive Screening   1) Repeat 3 items (Leader, Season, Table)    2) Clock draw: NORMAL  3) 3 item recall: Recalls 3 objects  Results: 3 items recalled: COGNITIVE IMPAIRMENT LESS LIKELY    Mini-CogTM Copyright KATHIE Workman. Licensed by the author for use in [facility]; reprinted with permission (lily@.[facility]). All rights reserved.        Reviewed and updated as needed this visit by clinical staff   Tobacco  Allergies  Meds              Reviewed and updated as needed this visit by Provider                 Social History "     Tobacco Use     Smoking status: Never     Smokeless tobacco: Never   Vaping Use     Vaping status: Not on file   Substance Use Topics     Alcohol use: Not Currently     Alcohol/week: 6.0 standard drinks of alcohol             4/24/2023     1:30 PM   Alcohol Use   Prescreen: >3 drinks/day or >7 drinks/week? No          View : No data to display.              Do you have a current opioid prescription? No  Do you use any other controlled substances or medications that are not prescribed by a provider? None      Current providers sharing in care for this patient include:   Patient Care Team:  Mikie Lazaro MD as PCP - General (Internal Medicine)  Mikie Lazaro MD as Assigned PCP  Chris, Armani Hong MD as Assigned Heart and Vascular Provider    The following health maintenance items are reviewed in Epic and correct as of today:  Health Maintenance   Topic Date Due     Pneumococcal Vaccine: 65+ Years (2 - PCV) 11/30/2021     COVID-19 Vaccine (4 - Booster for Moderna series) 01/24/2022     INFLUENZA VACCINE (1) 09/01/2022     CMP  03/30/2023     LIPID  03/30/2023     ANNUAL REVIEW OF HM ORDERS  03/30/2023     CBC  03/30/2023     MEDICARE ANNUAL WELLNESS VISIT  03/30/2023     PSA  03/30/2023     COLORECTAL CANCER SCREENING  12/02/2023     FALL RISK ASSESSMENT  04/25/2024     ADVANCE CARE PLANNING  03/30/2027     DTAP/TDAP/TD IMMUNIZATION (3 - Td or Tdap) 10/29/2029     HEPATITIS C SCREENING  Completed     PHQ-2 (once per calendar year)  Completed     ZOSTER IMMUNIZATION  Completed     AORTIC ANEURYSM SCREENING (SYSTEM ASSIGNED)  Completed     IPV IMMUNIZATION  Aged Out     MENINGITIS IMMUNIZATION  Aged Out     Lab work is in process  [facility]       Coronary artery disease involving native coronary artery of native heart without angina pectoris   Has been following in cardiology.  Will have occasional chest pain with exertion.  Tolerating imdur.  Not needing any nitroglycerin.  Not  "checking blood pressure at home.    Hyperlipidemia LDL goal <160   Tolerating statin  Ascending aorta dilatation (H)   Echocardiogram in November - no issues  Acute gouty arthritis   No recent gout attacks.  Tolerating allopurinol.  Avoiding alcohol  Urinary Urgency   Has noticed in the past year    Review of Systems   Constitutional: Negative for chills and fever.   HENT: Negative for congestion, ear pain, hearing loss and sore throat.    Eyes: Negative for pain and visual disturbance.   Respiratory: Negative for cough and shortness of breath.    Cardiovascular: Negative for chest pain, palpitations and peripheral edema.   Gastrointestinal: Negative for abdominal pain, constipation, diarrhea, heartburn, hematochezia and nausea.   Genitourinary: Positive for urgency. Negative for dysuria, frequency, genital sores, hematuria, impotence and penile discharge.   Musculoskeletal: Negative for arthralgias, joint swelling and myalgias.   Skin: Negative for rash.   Neurological: Negative for dizziness, weakness, headaches and paresthesias.   Psychiatric/Behavioral: Negative for mood changes. The patient is not nervous/anxious.        OBJECTIVE:   BP (!) 153/90 (BP Location: Left arm, Patient Position: Sitting, Cuff Size: Adult Large)   Pulse 53   Temp 97.3  F (36.3  C)   Resp 16   Ht 1.827 m (5' 11.93\")   Wt 78.5 kg (173 lb)   SpO2 100%   BMI 23.51 kg/m   Estimated body mass index is 23.51 kg/m  as calculated from the following:    Height as of this encounter: 1.827 m (5' 11.93\").    Weight as of this encounter: 78.5 kg (173 lb).  Physical Exam  GENERAL: healthy, alert and no distress  EYES: Eyes grossly normal to inspection,  HENT: ear canals and TM's normal, nose and mouth without ulcers or lesions  NECK: no adenopathy, no asymmetry, masses, or scars and thyroid normal to palpation  RESP: lungs clear to auscultation - no rales, rhonchi or wheezes  CV: regular rate and rhythm, normal S1 S2, no S3 or S4, no murmur, " click or rub, no peripheral edema  ABDOMEN: soft, nontender, no hepatosplenomegaly, no masses and bowel sounds normal  MS: no gross musculoskeletal defects noted, no edema  SKIN: no suspicious lesions or rashes  NEURO: Normal strength and tone, mentation intact and speech normal  PSYCH: mentation appears normal, affect normal/bright    Diagnostic Test Results:  Labs reviewed in Epic    ASSESSMENT / PLAN:     Patient Instructions   (Z00.00) Routine general medical examination at a health care facility  (primary encounter diagnosis)  Comment: For routine exam, we will draw labs as ordered, cholesterol, diabetes mellitus check, liver function, renal function, PSA and refer for colonoscopy.  We will also update vaccination history.  Plan: COMPREHENSIVE METABOLIC PANEL, CBC with         Platelets            (I25.10) Coronary artery disease involving native coronary artery of native heart without angina pectoris  Comment: Continue follow-up with cardiology.  Continue beta-blocker continue statin and continue aspirin.  Noted blood pressures been elevated today, will recheck before you leave  Plan: Lipid panel reflex to direct LDL Non-fasting            (Z12.5) Screening for prostate cancer  Comment:   Plan: PROSTATE SPEC ANTIGEN SCREEN            (E78.5) Hyperlipidemia LDL goal <160  Comment: Continue atorvastatin at current dose.  We will recheck fasting lipid panel today  Plan:     (I77.810) Ascending aorta dilatation (H)  Comment: Repeat echocardiogram recommended this fall -continue follow-up with cardiology  Plan:     (M10.9) Acute gouty arthritis  Comment: Recheck uric acid.  Continue allopurinol.  Colchicine available as needed  Plan: Uric acid            (I25.118) Coronary artery disease of native artery of native heart with stable angina pectoris (H)  Comment: As above  Plan:      Urinary urgency  Comment; urinalysis and PSA requested today.  Medications discussed and deferred        Patient has been advised of  split billing requirements and indicates understanding: Yes      COUNSELING:  Reviewed preventive health counseling, as reflected in patient instructions        He reports that he has never smoked. He has never used smokeless tobacco.      Appropriate preventive services were discussed with this patient, including applicable screening as appropriate for cardiovascular disease, diabetes, osteopenia/osteoporosis, and glaucoma.  As appropriate for age/gender, discussed screening for colorectal cancer, prostate cancer, breast cancer, and cervical cancer. Checklist reviewing preventive services available has been given to the patient.    Reviewed patients plan of care and provided an AVS. The Basic Care Plan (routine screening as documented in Health Maintenance) for [place] meets the Care Plan requirement. This Care Plan has been established and reviewed with the Patient.      Mikie Lazaro MD, MD  [facility]    Identified Health Risks:    I have reviewed Opioid Use Disorder and Substance Use Disorder risk factors and made any needed referrals.

## 2023-04-25 ENCOUNTER — OFFICE VISIT (OUTPATIENT)
Dept: FAMILY MEDICINE | Facility: CLINIC | Age: 68
End: 2023-04-25
Payer: COMMERCIAL

## 2023-04-25 VITALS
HEIGHT: 72 IN | SYSTOLIC BLOOD PRESSURE: 143 MMHG | RESPIRATION RATE: 16 BRPM | BODY MASS INDEX: 23.43 KG/M2 | WEIGHT: 173 LBS | HEART RATE: 54 BPM | TEMPERATURE: 97.3 F | DIASTOLIC BLOOD PRESSURE: 97 MMHG | OXYGEN SATURATION: 100 %

## 2023-04-25 DIAGNOSIS — Z12.5 SCREENING FOR PROSTATE CANCER: ICD-10-CM

## 2023-04-25 DIAGNOSIS — I77.810 ASCENDING AORTA DILATATION (H): ICD-10-CM

## 2023-04-25 DIAGNOSIS — Z00.00 ROUTINE GENERAL MEDICAL EXAMINATION AT A HEALTH CARE FACILITY: Primary | ICD-10-CM

## 2023-04-25 DIAGNOSIS — M10.9 ACUTE GOUTY ARTHRITIS: ICD-10-CM

## 2023-04-25 DIAGNOSIS — E78.5 HYPERLIPIDEMIA LDL GOAL <160: ICD-10-CM

## 2023-04-25 DIAGNOSIS — I25.118 CORONARY ARTERY DISEASE OF NATIVE ARTERY OF NATIVE HEART WITH STABLE ANGINA PECTORIS (H): ICD-10-CM

## 2023-04-25 DIAGNOSIS — I25.10 CORONARY ARTERY DISEASE INVOLVING NATIVE CORONARY ARTERY OF NATIVE HEART WITHOUT ANGINA PECTORIS: ICD-10-CM

## 2023-04-25 DIAGNOSIS — R39.15 URINARY URGENCY: ICD-10-CM

## 2023-04-25 LAB
ALBUMIN SERPL BCG-MCNC: 4.6 G/DL (ref 3.5–5.2)
ALBUMIN UR-MCNC: NEGATIVE MG/DL
ALP SERPL-CCNC: 87 U/L (ref 40–129)
ALT SERPL W P-5'-P-CCNC: 28 U/L (ref 10–50)
ANION GAP SERPL CALCULATED.3IONS-SCNC: 10 MMOL/L (ref 7–15)
APPEARANCE UR: CLEAR
AST SERPL W P-5'-P-CCNC: 36 U/L (ref 10–50)
BILIRUB SERPL-MCNC: 0.7 MG/DL
BILIRUB UR QL STRIP: NEGATIVE
BUN SERPL-MCNC: 14.4 MG/DL (ref 8–23)
CALCIUM SERPL-MCNC: 9.5 MG/DL (ref 8.8–10.2)
CHLORIDE SERPL-SCNC: 104 MMOL/L (ref 98–107)
CHOLEST SERPL-MCNC: 156 MG/DL
COLOR UR AUTO: YELLOW
CREAT SERPL-MCNC: 0.92 MG/DL (ref 0.67–1.17)
DEPRECATED HCO3 PLAS-SCNC: 27 MMOL/L (ref 22–29)
ERYTHROCYTE [DISTWIDTH] IN BLOOD BY AUTOMATED COUNT: 13.1 % (ref 10–15)
GFR SERPL CREATININE-BSD FRML MDRD: >90 ML/MIN/1.73M2
GLUCOSE SERPL-MCNC: 84 MG/DL (ref 70–99)
GLUCOSE UR STRIP-MCNC: NEGATIVE MG/DL
HCT VFR BLD AUTO: 45.5 % (ref 40–53)
HDLC SERPL-MCNC: 59 MG/DL
HGB BLD-MCNC: 15.6 G/DL (ref 13.3–17.7)
HGB UR QL STRIP: NEGATIVE
KETONES UR STRIP-MCNC: NEGATIVE MG/DL
LDLC SERPL CALC-MCNC: 84 MG/DL
LEUKOCYTE ESTERASE UR QL STRIP: NEGATIVE
MCH RBC QN AUTO: 31.8 PG (ref 26.5–33)
MCHC RBC AUTO-ENTMCNC: 34.3 G/DL (ref 31.5–36.5)
MCV RBC AUTO: 93 FL (ref 78–100)
NITRATE UR QL: NEGATIVE
NONHDLC SERPL-MCNC: 97 MG/DL
PH UR STRIP: 7 [PH] (ref 5–7)
PLATELET # BLD AUTO: 156 10E3/UL (ref 150–450)
POTASSIUM SERPL-SCNC: 4.5 MMOL/L (ref 3.4–5.3)
PROT SERPL-MCNC: 7.1 G/DL (ref 6.4–8.3)
PSA SERPL DL<=0.01 NG/ML-MCNC: 1.15 NG/ML (ref 0–4.5)
RBC # BLD AUTO: 4.9 10E6/UL (ref 4.4–5.9)
SODIUM SERPL-SCNC: 141 MMOL/L (ref 136–145)
SP GR UR STRIP: 1.01 (ref 1–1.03)
TRIGL SERPL-MCNC: 64 MG/DL
URATE SERPL-MCNC: 5.7 MG/DL (ref 3.4–7)
UROBILINOGEN UR STRIP-ACNC: 0.2 E.U./DL
WBC # BLD AUTO: 3.8 10E3/UL (ref 4–11)

## 2023-04-25 PROCEDURE — 81003 URINALYSIS AUTO W/O SCOPE: CPT | Performed by: INTERNAL MEDICINE

## 2023-04-25 PROCEDURE — 36415 COLL VENOUS BLD VENIPUNCTURE: CPT | Performed by: INTERNAL MEDICINE

## 2023-04-25 PROCEDURE — 99214 OFFICE O/P EST MOD 30 MIN: CPT | Mod: 25 | Performed by: INTERNAL MEDICINE

## 2023-04-25 PROCEDURE — G0439 PPPS, SUBSEQ VISIT: HCPCS | Performed by: INTERNAL MEDICINE

## 2023-04-25 PROCEDURE — 90677 PCV20 VACCINE IM: CPT | Performed by: INTERNAL MEDICINE

## 2023-04-25 PROCEDURE — G0009 ADMIN PNEUMOCOCCAL VACCINE: HCPCS | Performed by: INTERNAL MEDICINE

## 2023-04-25 PROCEDURE — 85027 COMPLETE CBC AUTOMATED: CPT | Performed by: INTERNAL MEDICINE

## 2023-04-25 PROCEDURE — 80061 LIPID PANEL: CPT | Performed by: INTERNAL MEDICINE

## 2023-04-25 PROCEDURE — G0103 PSA SCREENING: HCPCS | Performed by: INTERNAL MEDICINE

## 2023-04-25 PROCEDURE — 84550 ASSAY OF BLOOD/URIC ACID: CPT | Performed by: INTERNAL MEDICINE

## 2023-04-25 PROCEDURE — 80053 COMPREHEN METABOLIC PANEL: CPT | Performed by: INTERNAL MEDICINE

## 2023-04-25 ASSESSMENT — PAIN SCALES - GENERAL: PAINLEVEL: NO PAIN (0)

## 2023-04-25 NOTE — RESULT ENCOUNTER NOTE
Vito Saxena,    I have had the opportunity to review your recent results and an interpretation is as follows:  Your complete blood counts shows stable white blood cell count      Sincerely,  Mikie Lazaro MD

## 2023-04-25 NOTE — NURSING NOTE
Prior to immunization administration, verified patients identity using patient s name and date of birth. Please see Immunization Activity for additional information.     Screening Questionnaire for Adult Immunization    Are you sick today?   No   Do you have allergies to medications, food, a vaccine component or latex?   No   Have you ever had a serious reaction after receiving a vaccination?   No   Do you have a long-term health problem with heart, lung, kidney, or metabolic disease (e.g., diabetes), asthma, a blood disorder, no spleen, complement component deficiency, a cochlear implant, or a spinal fluid leak?  Are you on long-term aspirin therapy?   Yes   Do you have cancer, leukemia, HIV/AIDS, or any other immune system problem?   No   Do you have a parent, brother, or sister with an immune system problem?   No   In the past 3 months, have you taken medications that affect  your immune system, such as prednisone, other steroids, or anticancer drugs; drugs for the treatment of rheumatoid arthritis, Crohn s disease, or psoriasis; or have you had radiation treatments?   No   Have you had a seizure, or a brain or other nervous system problem?   No   During the past year, have you received a transfusion of blood or blood    products, or been given immune (gamma) globulin or antiviral drug?   No   For women: Are you pregnant or is there a chance you could become       pregnant during the next month?   No   Have you received any vaccinations in the past 4 weeks?   No     Immunization questionnaire was positive for at least one answer.  Notified Dr. Lazaro.      Injection of Prevnar 20 given by Rebekah Pagan MA. Patient instructed to remain in clinic for 15 minutes afterwards, and to report any adverse reactions.   Patient verified  Screening performed by Rebekah Pagan MA on 4/25/2023 at 8:32 AM.

## 2023-04-25 NOTE — RESULT ENCOUNTER NOTE
Vito Saxena,    I had the opportunity to review your recent labs and a summary of your labs reads as follows:    Your complete blood counts show no sign of anemia, normal white blood cell count and platelets.  Your comprehensive metabolic panel showed normal renal function, normal liver function, and normal fasting blood glucose indicating no evidence of diabetes mellitus.  Your fasting lipid panel show  - normal HDL (good) cholesterol -as your goal is greater than 40  - low LDL (bad) cholesterol as your goal is less than 100  - normal triglyceride levels  Your PSA level is also stable indicating no evidence of prostate cancer   Your uric acid level also returned stable within normal limits    Sincerely,  Mikie Lazaro MD

## 2023-04-25 NOTE — PATIENT INSTRUCTIONS
(Z00.00) Routine general medical examination at a health care facility  (primary encounter diagnosis)  Comment: For routine exam, we will draw labs as ordered, cholesterol, diabetes mellitus check, liver function, renal function, PSA and refer for colonoscopy.  We will also update vaccination history.  Plan: COMPREHENSIVE METABOLIC PANEL, CBC with         Platelets            (I25.10) Coronary artery disease involving native coronary artery of native heart without angina pectoris  Comment: Continue follow-up with cardiology.  Continue beta-blocker continue statin and continue aspirin.  Noted blood pressures been elevated today, will recheck before you leave  Plan: Lipid panel reflex to direct LDL Non-fasting            (Z12.5) Screening for prostate cancer  Comment:   Plan: PROSTATE SPEC ANTIGEN SCREEN            (E78.5) Hyperlipidemia LDL goal <160  Comment: Continue atorvastatin at current dose.  We will recheck fasting lipid panel today  Plan:     (I77.810) Ascending aorta dilatation (H)  Comment: Repeat echocardiogram recommended this fall -continue follow-up with cardiology  Plan:     (M10.9) Acute gouty arthritis  Comment: Recheck uric acid.  Continue allopurinol.  Colchicine available as needed  Plan: Uric acid            (I25.118) Coronary artery disease of native artery of native heart with stable angina pectoris (H)  Comment: As above  Plan:      Urinary urgency  Comment; urinalysis and PSA requested today.  Medications discussed and deferred

## 2023-06-18 DIAGNOSIS — M10.9 ACUTE GOUTY ARTHRITIS: ICD-10-CM

## 2023-06-19 RX ORDER — ALLOPURINOL 100 MG/1
TABLET ORAL
Qty: 90 TABLET | Refills: 2 | Status: SHIPPED | OUTPATIENT
Start: 2023-06-19 | End: 2024-03-01

## 2023-07-01 ENCOUNTER — TRANSFERRED RECORDS (OUTPATIENT)
Dept: HEALTH INFORMATION MANAGEMENT | Facility: CLINIC | Age: 68
End: 2023-07-01
Payer: COMMERCIAL

## 2023-09-11 DIAGNOSIS — I25.10 CORONARY ARTERY DISEASE INVOLVING NATIVE CORONARY ARTERY OF NATIVE HEART WITHOUT ANGINA PECTORIS: ICD-10-CM

## 2023-09-11 RX ORDER — ATORVASTATIN CALCIUM 80 MG/1
80 TABLET, FILM COATED ORAL DAILY
Qty: 90 TABLET | Refills: 0 | Status: SHIPPED | OUTPATIENT
Start: 2023-09-11 | End: 2023-09-29

## 2023-09-25 DIAGNOSIS — I25.118 CORONARY ARTERY DISEASE OF NATIVE ARTERY OF NATIVE HEART WITH STABLE ANGINA PECTORIS (H): ICD-10-CM

## 2023-09-25 RX ORDER — ISOSORBIDE MONONITRATE 30 MG/1
90 TABLET, EXTENDED RELEASE ORAL DAILY
Qty: 270 TABLET | Refills: 2 | Status: CANCELLED | OUTPATIENT
Start: 2023-09-25

## 2023-09-26 ENCOUNTER — TELEPHONE (OUTPATIENT)
Dept: CARDIOLOGY | Facility: CLINIC | Age: 68
End: 2023-09-26
Payer: COMMERCIAL

## 2023-09-26 DIAGNOSIS — I25.118 CORONARY ARTERY DISEASE OF NATIVE ARTERY OF NATIVE HEART WITH STABLE ANGINA PECTORIS (H): ICD-10-CM

## 2023-09-26 RX ORDER — ISOSORBIDE MONONITRATE 30 MG/1
90 TABLET, EXTENDED RELEASE ORAL DAILY
Qty: 270 TABLET | Refills: 0 | Status: SHIPPED | OUTPATIENT
Start: 2023-09-26 | End: 2023-09-29

## 2023-09-29 ENCOUNTER — MYC REFILL (OUTPATIENT)
Dept: CARDIOLOGY | Facility: CLINIC | Age: 68
End: 2023-09-29
Payer: COMMERCIAL

## 2023-09-29 DIAGNOSIS — I25.10 CORONARY ARTERY DISEASE INVOLVING NATIVE CORONARY ARTERY OF NATIVE HEART WITHOUT ANGINA PECTORIS: ICD-10-CM

## 2023-09-29 DIAGNOSIS — I25.118 CORONARY ARTERY DISEASE OF NATIVE ARTERY OF NATIVE HEART WITH STABLE ANGINA PECTORIS (H): ICD-10-CM

## 2023-10-02 RX ORDER — ISOSORBIDE MONONITRATE 30 MG/1
90 TABLET, EXTENDED RELEASE ORAL DAILY
Qty: 270 TABLET | Refills: 1 | Status: SHIPPED | OUTPATIENT
Start: 2023-10-02 | End: 2024-02-09

## 2023-10-02 RX ORDER — ATORVASTATIN CALCIUM 80 MG/1
80 TABLET, FILM COATED ORAL DAILY
Qty: 90 TABLET | Refills: 1 | Status: SHIPPED | OUTPATIENT
Start: 2023-10-02 | End: 2024-02-09

## 2023-10-02 NOTE — TELEPHONE ENCOUNTER
Received [employer] message requesting refills of atorvastatin 80 mg daily and isosorbide mononitrate 30 mg daily. [facility] Cardiology Refill Guideline reviewed.  Medication meets criteria for refill. Follow up is scheduled with Dr. Perez on 2/9/24. [employer] message sent to pt advising he is also due for an echo, requested pt call to schedule.

## 2023-10-30 ENCOUNTER — IMMUNIZATION (OUTPATIENT)
Dept: FAMILY MEDICINE | Facility: CLINIC | Age: 68
End: 2023-10-30
Payer: COMMERCIAL

## 2023-10-30 DIAGNOSIS — Z23 NEED FOR PROPHYLACTIC VACCINATION AND INOCULATION AGAINST INFLUENZA: Primary | ICD-10-CM

## 2023-10-30 PROCEDURE — 90662 IIV NO PRSV INCREASED AG IM: CPT

## 2023-10-30 PROCEDURE — G0008 ADMIN INFLUENZA VIRUS VAC: HCPCS

## 2023-11-02 DIAGNOSIS — M10.9 ACUTE GOUTY ARTHRITIS: ICD-10-CM

## 2023-11-03 RX ORDER — COLCHICINE 0.6 MG/1
TABLET ORAL
Qty: 30 TABLET | Refills: 4 | Status: SHIPPED | OUTPATIENT
Start: 2023-11-03 | End: 2024-03-01

## 2023-11-10 ENCOUNTER — TRANSFERRED RECORDS (OUTPATIENT)
Dept: HEALTH INFORMATION MANAGEMENT | Facility: CLINIC | Age: 68
End: 2023-11-10

## 2023-11-14 ENCOUNTER — TRANSFERRED RECORDS (OUTPATIENT)
Dept: HEALTH INFORMATION MANAGEMENT | Facility: CLINIC | Age: 68
End: 2023-11-14

## 2023-11-27 ENCOUNTER — HOSPITAL ENCOUNTER (OUTPATIENT)
Dept: CARDIOLOGY | Facility: CLINIC | Age: 68
Discharge: HOME | End: 2023-11-27
Attending: INTERNAL MEDICINE | Admitting: INTERNAL MEDICINE
Payer: COMMERCIAL

## 2023-11-27 ENCOUNTER — TELEPHONE (OUTPATIENT)
Dept: CARDIOLOGY | Facility: CLINIC | Age: 68
End: 2023-11-27

## 2023-11-27 DIAGNOSIS — I77.810 ASCENDING AORTA DILATATION (H): ICD-10-CM

## 2023-11-27 DIAGNOSIS — I25.10 CORONARY ARTERY DISEASE INVOLVING NATIVE CORONARY ARTERY OF NATIVE HEART WITHOUT ANGINA PECTORIS: ICD-10-CM

## 2023-11-27 LAB — LVEF ECHO: NORMAL

## 2023-11-27 PROCEDURE — 93306 TTE W/DOPPLER COMPLETE: CPT | Mod: 26 | Performed by: INTERNAL MEDICINE

## 2023-11-27 PROCEDURE — 93306 TTE W/DOPPLER COMPLETE: CPT

## 2023-11-27 NOTE — TELEPHONE ENCOUNTER
Pt has annual follow-up with Dr Perez on 2/9/23.  Routing results of today's echo to Dr Perez for review and recommendations.  His echo prior to this was on 11/28/22; results are included below for comparison.     LOV 9/14/22 with Dr Perez  IMPRESSION AND PLAN:    1.  Chronic stable angina.  2.  Coronary artery disease, status post prior CABG x5 in 09/2013.  3.  Hyperlipidemia.  4.  Moderate ascending aortic dilatation     Mr. Elliott's stable angina symptoms have significantly improved after increasing the Imdur dose.  He now only gets mild chest discomfort with high workloads. He has been using additional 30 mg dose on occasional days with further improved symptoms.  Again reviewed indications for revascularization.  I told the patient that going after that diagonal branch which is heavily calcified will not prevent future heart attacks or improve survival.  As such, I would recommend continued medical therapy.  If his symptoms become worse or unacceptable then we will proceed with atherectomy and stenting of the diagonal branch.  The patient is very much in agreement with this plan.     His aortic root is dilated per last echocardiogram.  We will repeat another echocardiogram in 3 months. His risk factors including his blood pressure well controlled.    11/27/23  Interpretation Summary     1. Left ventricular systolic function is normal. The visual ejection fraction  is 60-65%.  2. There is moderate concentric left ventricular hypertrophy.  3. No regional wall motion abnormalities noted.  4. The right ventricle is normal in structure, function and size.  5. There is mild (1+) mitral regurgitation.  6. Aortic root dilatation is present, 4.2 cm (measured 4.6 cm on previous  echo). Ascending aorta dilatation is presentm 4.2 cm (measured 4.2 on previous  echo).    11/28/22 Echo  Interpretation Summary     The ascending aorta is Mildly dilated, 4.1 cm, 3.8 cm in most recent study.  Moderate aortic root dilatation, 4.6 cm.  Unchanged compared with most recent  study.    ADDENDUM  MyChart message sent to pt with Dr Perez's recommendations below.     Armani Perez MD   to [facility]    11/27/23  3:03 PM  Echo reviewed.  Stable findings.  No new recommendations.  Follow-up as arranged

## 2024-01-10 ENCOUNTER — MYC REFILL (OUTPATIENT)
Dept: CARDIOLOGY | Facility: CLINIC | Age: 69
End: 2024-01-10
Payer: COMMERCIAL

## 2024-01-10 DIAGNOSIS — I25.10 CORONARY ARTERY DISEASE INVOLVING NATIVE CORONARY ARTERY OF NATIVE HEART WITHOUT ANGINA PECTORIS: ICD-10-CM

## 2024-01-10 RX ORDER — LISINOPRIL 2.5 MG/1
2.5 TABLET ORAL DAILY
Qty: 90 TABLET | Refills: 0 | Status: SHIPPED | OUTPATIENT
Start: 2024-01-10 | End: 2024-01-10

## 2024-01-10 RX ORDER — LISINOPRIL 2.5 MG/1
2.5 TABLET ORAL DAILY
Qty: 90 TABLET | Refills: 3 | OUTPATIENT
Start: 2024-01-10

## 2024-01-11 RX ORDER — LISINOPRIL 2.5 MG/1
2.5 TABLET ORAL DAILY
Qty: 30 TABLET | Refills: 0 | Status: SHIPPED | OUTPATIENT
Start: 2024-01-11 | End: 2024-02-09

## 2024-02-09 ENCOUNTER — OFFICE VISIT (OUTPATIENT)
Dept: CARDIOLOGY | Facility: CLINIC | Age: 69
End: 2024-02-09
Payer: COMMERCIAL

## 2024-02-09 VITALS
HEIGHT: 72 IN | BODY MASS INDEX: 23.74 KG/M2 | OXYGEN SATURATION: 99 % | HEART RATE: 54 BPM | DIASTOLIC BLOOD PRESSURE: 75 MMHG | SYSTOLIC BLOOD PRESSURE: 129 MMHG | WEIGHT: 175.3 LBS

## 2024-02-09 DIAGNOSIS — I77.810 ASCENDING AORTA DILATATION (H): ICD-10-CM

## 2024-02-09 DIAGNOSIS — I25.118 CORONARY ARTERY DISEASE OF NATIVE ARTERY OF NATIVE HEART WITH STABLE ANGINA PECTORIS (H): ICD-10-CM

## 2024-02-09 DIAGNOSIS — I25.10 CORONARY ARTERY DISEASE INVOLVING NATIVE CORONARY ARTERY OF NATIVE HEART WITHOUT ANGINA PECTORIS: ICD-10-CM

## 2024-02-09 DIAGNOSIS — I20.89 STABLE ANGINA (H): ICD-10-CM

## 2024-02-09 PROCEDURE — 99214 OFFICE O/P EST MOD 30 MIN: CPT | Performed by: INTERNAL MEDICINE

## 2024-02-09 RX ORDER — ISOSORBIDE MONONITRATE 30 MG/1
90 TABLET, EXTENDED RELEASE ORAL DAILY
Qty: 270 TABLET | Refills: 3 | Status: SHIPPED | OUTPATIENT
Start: 2024-02-09

## 2024-02-09 RX ORDER — LISINOPRIL 2.5 MG/1
2.5 TABLET ORAL DAILY
Qty: 90 TABLET | Refills: 3 | Status: SHIPPED | OUTPATIENT
Start: 2024-02-09 | End: 2024-02-09

## 2024-02-09 RX ORDER — LISINOPRIL 2.5 MG/1
2.5 TABLET ORAL DAILY
Qty: 30 TABLET | Refills: 0 | Status: SHIPPED | OUTPATIENT
Start: 2024-02-09 | End: 2024-03-05

## 2024-02-09 RX ORDER — ATORVASTATIN CALCIUM 80 MG/1
80 TABLET, FILM COATED ORAL DAILY
Qty: 90 TABLET | Refills: 3 | Status: SHIPPED | OUTPATIENT
Start: 2024-02-09

## 2024-02-09 RX ORDER — NITROGLYCERIN 0.4 MG/1
TABLET SUBLINGUAL
Qty: 30 TABLET | Refills: 1 | Status: SHIPPED | OUTPATIENT
Start: 2024-02-09

## 2024-02-09 NOTE — LETTER
2/9/2024    Mikie Lazaro MD, MD  [address]  [facility] [address]    RE: Odessa Elliott       Dear Colleague,     I had the pleasure of seeing Odessa Elliott in the [facility].  CARDIOLOGY CLINIC CONSULTATION    PRIMARY CARE PHYSICIAN:  Mikie Lazaro MD    HISTORY OF PRESENT ILLNESS:  I had the pleasure of seeing Odessa Elliott at [facility] in [place] this morning.  He is a very pleasant, 67-year-old gentleman who is well known to me.  He has known coronary artery disease and hyperlipidemia.  In 2013, we evaluated him for unstable angina.  Coronary angiogram at that time revealed severe 3 vessel coronary artery disease. He subsequently underwent coronary artery bypass grafting surgery x5 with a LIMA to LAD, vein graft to the right posterior descending artery, vein graft to the right posterolateral branch, vein graft to the obtuse marginal 1 and vein graft to the obtuse marginal branch 2.  He did well the years following his surgery.     In October of 2020, he started noticing exertional chest burning.  The episodes were initially brought on by strenuous activity, but subsequently came on with his usual walks.  His wife who is a nurse was not aware of his symptoms.  When he mentioned those symptoms, she brought him to the emergency department.  This was in November of 2020.  His troponin was mildly elevated without significant delta.  He then proceeded to have coronary and graft angiography, which I have personally reviewed.  The angiogram revealed severe native vessel coronary artery disease as expected.  His LIMA to the LAD was widely patent.  His SVG to RCA was also widely patent.  The SVG to the right posterolateral branch was patent, although the graft was small.  The vein graft to the first obtuse marginal branch was patent, and the SVG to the second obtuse marginal branch was patent.  His RCA was chronically occluded.  His left main had  moderate to severe disease, and the proximal LAD, which supplied a moderate-sized diagonal branch, had 95% calcific stenosis.     After his angiogram, the patient was commenced on medical therapy.  He was initially on Imdur 30 mg daily with improved symptoms.  Subsequently, I saw him in February 2021.  At that time he was still complaining of chest pain with exertion.  I then increased his Imdur to 60 mg daily.  He mentioned stable but ongoing chest discomfort on our next visit which was August 2021.  He subsequently proceeded to have repeat angiogram in September of 2021. An attempt to stent the diagonal branch was unsuccessful.  The lesion was heavily calcified. We were able to wire it but could not cross it with a balloon.  Given the size of the vessel and the potential need of atherectomy we took the patient off the table.  I then saw him in November of 2021 to discuss escalation of medical therapy versus complex intervention.  We elected to increase his Imdur to 90 mg daily.  Since then his symptoms have dramatically improved.  He notices occasional chest tightness with high workload.  No symptoms with his usual activities.    He has risk factors are well-controlled although his last few lipid profile showed an LDL greater than 70.    PAST MEDICAL HISTORY:  Past Medical History:   Diagnosis Date    Acute gouty arthritis     Aortic root dilatation (H24)     Ascending aorta dilatation (H24)     Chest pain     Coronary artery disease     Gout 11/24/2020    Hyperlipidaemia     Lower GI bleed 09/30/2013    NSTEMI (non-ST elevated myocardial infarction) (H) 11/24/2020    Osteoarthritis     s/p right IRENE 11/26/19 at [facility]    S/P CABG x 5 2013    LIMA-LAD, SVG-PDA, SVG-PL, SVG-OM1, SVG- OM2 (9/2013)    Shortness of breath     Stable angina     Status post coronary angiogram 09/21/2021    Status post coronary angiogram 09/21/2021    Unstable angina (H) 11/24/2020    Urinary retention         MEDICATIONS:  Current Outpatient Medications   Medication    allopurinol (ZYLOPRIM) 100 MG tablet    aspirin 81 MG tablet    atorvastatin (LIPITOR) 80 MG tablet    colchicine (COLCRYS) 0.6 MG tablet    isosorbide mononitrate (IMDUR) 30 MG 24 hr tablet    lisinopril (ZESTRIL) 2.5 MG tablet    metoprolol succinate ER (TOPROL XL) 25 MG 24 hr tablet    nitroGLYcerin (NITROSTAT) 0.4 MG sublingual tablet    NONFORMULARY    vitamin C (ASCORBIC ACID) 1000 MG TABS    Vitamin D, Cholecalciferol, 1000 UNITS TABS    Multiple Vitamin (MULTI VITAMIN DAILY PO)    UNKNOWN TO PATIENT     No current facility-administered medications for this visit.       SOCIAL HISTORY:  I have reviewed this patient's social history and updated it with pertinent information if needed. Odessa Elliott  reports that he has never smoked. He has never used smokeless tobacco. He reports that he does not currently use alcohol after a past usage of about 6.0 standard drinks of alcohol per week. He reports that he does not use drugs.    PHYSICAL EXAM:  Pulse:  [54] 54  BP: (129)/(75) 129/75  SpO2:  [99 %] 99 %  175 lbs 4.8 oz    Constitutional: alert, no distress  Respiratory: Good bilateral air entry  Cardiovascular: Regular rate and rhythm, no murmurs  GI: nondistended  Neuropsychiatric: appropriate affact    ASSESSMENT: Pertinent issues addressed/ reviewed during this cardiology visit  Coronary disease status post CABG x 5 in September 2023  Chronic stable angina with high workloads  Hyperlipidemia, treated  Hypertension  Mild to moderate ascending aorta and aortic root dilatation    RECOMMENDATIONS:  His stable angina symptoms have not changed over the past year.  He only gets mild chest discomfort with high workloads, especially when he starts exercising.  Symptoms usually go away without any intervention.  He remains on Imdur as well as beta-blocker.  Again reviewed indications for revascularization. Given stable symptoms and potential high risk  intervention of the heavily calcified diagonal branch, I again recommended continued medical therapy.  We reviewed his recent echocardiogram.  The aortic root and ascending aorta measured 4.2 cm this time.  The aortic root measurement is smaller than his prior echocardiogram measurements.  Will recommend CTA of the chest later this year for more accurate measurement.  Blood pressure is well-controlled.  I would like his LDL less than 70 mg/dl.  He is scheduled to have repeat lipid profile in the spring.  If his LDL is greater than 70, recommend adding Zetia to his regimen.  Follow-up in 1 year but sooner if he develops worsening cardiac symptoms.    It was a pleasure seeing this patient in clinic today. Please do not hesitate to contact me with any future questions.     Armani Perez MD, [facility]  Cardiology - [facility]  February 9, 2024    Review of the result(s) of each unique test - Last echocardiogram, ECG, lipid profile, BMP     The level of medical decision making during this visit was of moderate complexity.    This note was completed in part using dictation via the Dragon voice recognition software. Some word and grammatical errors may occur and must be interpreted in the appropriate clinical context.  If there are any questions pertaining to this issue, please contact me for further clarification.      Thank you for allowing me to participate in the care of your patient.      Sincerely,     Armani Perez MD, MD     [facility]  cc:   Referred Self,

## 2024-02-09 NOTE — PROGRESS NOTES
CARDIOLOGY CLINIC CONSULTATION    PRIMARY CARE PHYSICIAN:  Mikie Lazaro MD    HISTORY OF PRESENT ILLNESS:  I had the pleasure of seeing Odessa Elliott at [facility] in [place] this morning.  He is a very pleasant, 67-year-old gentleman who is well known to me.  He has known coronary artery disease and hyperlipidemia.  In 2013, we evaluated him for unstable angina.  Coronary angiogram at that time revealed severe 3 vessel coronary artery disease. He subsequently underwent coronary artery bypass grafting surgery x5 with a LIMA to LAD, vein graft to the right posterior descending artery, vein graft to the right posterolateral branch, vein graft to the obtuse marginal 1 and vein graft to the obtuse marginal branch 2.  He did well the years following his surgery.     In October of 2020, he started noticing exertional chest burning.  The episodes were initially brought on by strenuous activity, but subsequently came on with his usual walks.  His wife who is a nurse was not aware of his symptoms.  When he mentioned those symptoms, she brought him to the emergency department.  This was in November of 2020.  His troponin was mildly elevated without significant delta.  He then proceeded to have coronary and graft angiography, which I have personally reviewed.  The angiogram revealed severe native vessel coronary artery disease as expected.  His LIMA to the LAD was widely patent.  His SVG to RCA was also widely patent.  The SVG to the right posterolateral branch was patent, although the graft was small.  The vein graft to the first obtuse marginal branch was patent, and the SVG to the second obtuse marginal branch was patent.  His RCA was chronically occluded.  His left main had moderate to severe disease, and the proximal LAD, which supplied a moderate-sized diagonal branch, had 95% calcific stenosis.     After his angiogram, the patient was commenced on medical therapy.  He was initially on  Imdur 30 mg daily with improved symptoms.  Subsequently, I saw him in February 2021.  At that time he was still complaining of chest pain with exertion.  I then increased his Imdur to 60 mg daily.  He mentioned stable but ongoing chest discomfort on our next visit which was August 2021.  He subsequently proceeded to have repeat angiogram in September of 2021. An attempt to stent the diagonal branch was unsuccessful.  The lesion was heavily calcified. We were able to wire it but could not cross it with a balloon.  Given the size of the vessel and the potential need of atherectomy we took the patient off the table.  I then saw him in November of 2021 to discuss escalation of medical therapy versus complex intervention.  We elected to increase his Imdur to 90 mg daily.  Since then his symptoms have dramatically improved.  He notices occasional chest tightness with high workload.  No symptoms with his usual activities.    He has risk factors are well-controlled although his last few lipid profile showed an LDL greater than 70.    PAST MEDICAL HISTORY:  Past Medical History:   Diagnosis Date    Acute gouty arthritis     Aortic root dilatation (H24)     Ascending aorta dilatation (H24)     Chest pain     Coronary artery disease     Gout 11/24/2020    Hyperlipidaemia     Lower GI bleed 09/30/2013    NSTEMI (non-ST elevated myocardial infarction) (H) 11/24/2020    Osteoarthritis     s/p right IRENE 11/26/19 at [facility]    S/P CABG x 5 2013    LIMA-LAD, SVG-PDA, SVG-PL, SVG-OM1, SVG- OM2 (9/2013)    Shortness of breath     Stable angina     Status post coronary angiogram 09/21/2021    Status post coronary angiogram 09/21/2021    Unstable angina (H) 11/24/2020    Urinary retention        MEDICATIONS:  Current Outpatient Medications   Medication    allopurinol (ZYLOPRIM) 100 MG tablet    aspirin 81 MG tablet    atorvastatin (LIPITOR) 80 MG tablet    colchicine (COLCRYS) 0.6 MG tablet    isosorbide mononitrate (IMDUR)  30 MG 24 hr tablet    lisinopril (ZESTRIL) 2.5 MG tablet    metoprolol succinate ER (TOPROL XL) 25 MG 24 hr tablet    nitroGLYcerin (NITROSTAT) 0.4 MG sublingual tablet    NONFORMULARY    vitamin C (ASCORBIC ACID) 1000 MG TABS    Vitamin D, Cholecalciferol, 1000 UNITS TABS    Multiple Vitamin (MULTI VITAMIN DAILY PO)    UNKNOWN TO PATIENT     No current facility-administered medications for this visit.       SOCIAL HISTORY:  I have reviewed this patient's social history and updated it with pertinent information if needed. Odessa LOPEZ Lexi  reports that he has never smoked. He has never used smokeless tobacco. He reports that he does not currently use alcohol after a past usage of about 6.0 standard drinks of alcohol per week. He reports that he does not use drugs.    PHYSICAL EXAM:  Pulse:  [54] 54  BP: (129)/(75) 129/75  SpO2:  [99 %] 99 %  175 lbs 4.8 oz    Constitutional: alert, no distress  Respiratory: Good bilateral air entry  Cardiovascular: Regular rate and rhythm, no murmurs  GI: nondistended  Neuropsychiatric: appropriate affact    ASSESSMENT: Pertinent issues addressed/ reviewed during this cardiology visit  Coronary disease status post CABG x 5 in September 2023  Chronic stable angina with high workloads  Hyperlipidemia, treated  Hypertension  Mild to moderate ascending aorta and aortic root dilatation    RECOMMENDATIONS:  His stable angina symptoms have not changed over the past year.  He only gets mild chest discomfort with high workloads, especially when he starts exercising.  Symptoms usually go away without any intervention.  He remains on Imdur as well as beta-blocker.  Again reviewed indications for revascularization. Given stable symptoms and potential high risk intervention of the heavily calcified diagonal branch, I again recommended continued medical therapy.  We reviewed his recent echocardiogram.  The aortic root and ascending aorta measured 4.2 cm this time.  The aortic root measurement is  smaller than his prior echocardiogram measurements.  Will recommend CTA of the chest later this year for more accurate measurement.  Blood pressure is well-controlled.  I would like his LDL less than 70 mg/dl.  He is scheduled to have repeat lipid profile in the spring.  If his LDL is greater than 70, recommend adding Zetia to his regimen.  Follow-up in 1 year but sooner if he develops worsening cardiac symptoms.    It was a pleasure seeing this patient in clinic today. Please do not hesitate to contact me with any future questions.     Armani Perez MD, [facility]  Cardiology - [facility]  February 9, 2024    Review of the result(s) of each unique test - Last echocardiogram, ECG, lipid profile, BMP     The level of medical decision making during this visit was of moderate complexity.    This note was completed in part using dictation via the Dragon voice recognition software. Some word and grammatical errors may occur and must be interpreted in the appropriate clinical context.  If there are any questions pertaining to this issue, please contact me for further clarification.

## 2024-03-01 ENCOUNTER — MYC REFILL (OUTPATIENT)
Dept: FAMILY MEDICINE | Facility: CLINIC | Age: 69
End: 2024-03-01
Payer: COMMERCIAL

## 2024-03-01 DIAGNOSIS — M10.9 ACUTE GOUTY ARTHRITIS: ICD-10-CM

## 2024-03-01 RX ORDER — ALLOPURINOL 100 MG/1
100 TABLET ORAL DAILY
Qty: 90 TABLET | Refills: 0 | Status: SHIPPED | OUTPATIENT
Start: 2024-03-01 | End: 2024-05-29

## 2024-03-01 RX ORDER — COLCHICINE 0.6 MG/1
TABLET ORAL
Qty: 30 TABLET | Refills: 4 | Status: SHIPPED | OUTPATIENT
Start: 2024-03-01

## 2024-03-05 DIAGNOSIS — I25.10 CORONARY ARTERY DISEASE INVOLVING NATIVE CORONARY ARTERY OF NATIVE HEART WITHOUT ANGINA PECTORIS: ICD-10-CM

## 2024-03-05 RX ORDER — LISINOPRIL 2.5 MG/1
2.5 TABLET ORAL DAILY
Qty: 90 TABLET | Refills: 4 | Status: SHIPPED | OUTPATIENT
Start: 2024-03-05

## 2024-04-01 DIAGNOSIS — I25.10 CORONARY ARTERY DISEASE INVOLVING NATIVE CORONARY ARTERY OF NATIVE HEART WITHOUT ANGINA PECTORIS: ICD-10-CM

## 2024-04-01 RX ORDER — METOPROLOL SUCCINATE 25 MG/1
12.5 TABLET, EXTENDED RELEASE ORAL DAILY
Qty: 45 TABLET | Refills: 4 | Status: SHIPPED | OUTPATIENT
Start: 2024-04-01

## 2024-05-28 DIAGNOSIS — M10.9 ACUTE GOUTY ARTHRITIS: ICD-10-CM

## 2024-05-29 RX ORDER — ALLOPURINOL 100 MG/1
100 TABLET ORAL DAILY
Qty: 90 TABLET | Refills: 0 | Status: SHIPPED | OUTPATIENT
Start: 2024-05-29 | End: 2024-07-09

## 2024-06-22 ENCOUNTER — HEALTH MAINTENANCE LETTER (OUTPATIENT)
Age: 69
End: 2024-06-22

## 2024-07-08 SDOH — HEALTH STABILITY: PHYSICAL HEALTH: ON AVERAGE, HOW MANY DAYS PER WEEK DO YOU ENGAGE IN MODERATE TO STRENUOUS EXERCISE (LIKE A BRISK WALK)?: 5 DAYS

## 2024-07-08 SDOH — HEALTH STABILITY: PHYSICAL HEALTH: ON AVERAGE, HOW MANY MINUTES DO YOU ENGAGE IN EXERCISE AT THIS LEVEL?: 70 MIN

## 2024-07-08 ASSESSMENT — SOCIAL DETERMINANTS OF HEALTH (SDOH): HOW OFTEN DO YOU GET TOGETHER WITH FRIENDS OR RELATIVES?: THREE TIMES A WEEK

## 2024-07-09 ENCOUNTER — OFFICE VISIT (OUTPATIENT)
Dept: FAMILY MEDICINE | Facility: CLINIC | Age: 69
End: 2024-07-09
Payer: COMMERCIAL

## 2024-07-09 VITALS
TEMPERATURE: 97.1 F | DIASTOLIC BLOOD PRESSURE: 75 MMHG | HEIGHT: 71 IN | RESPIRATION RATE: 14 BRPM | BODY MASS INDEX: 23.88 KG/M2 | HEART RATE: 66 BPM | WEIGHT: 170.6 LBS | OXYGEN SATURATION: 99 % | SYSTOLIC BLOOD PRESSURE: 121 MMHG

## 2024-07-09 DIAGNOSIS — I25.10 CORONARY ARTERY DISEASE INVOLVING NATIVE CORONARY ARTERY OF NATIVE HEART WITHOUT ANGINA PECTORIS: ICD-10-CM

## 2024-07-09 DIAGNOSIS — Z12.11 SCREEN FOR COLON CANCER: ICD-10-CM

## 2024-07-09 DIAGNOSIS — M10.9 ACUTE GOUTY ARTHRITIS: ICD-10-CM

## 2024-07-09 DIAGNOSIS — Z00.00 ROUTINE GENERAL MEDICAL EXAMINATION AT A HEALTH CARE FACILITY: Primary | ICD-10-CM

## 2024-07-09 DIAGNOSIS — Z12.5 SCREENING FOR PROSTATE CANCER: ICD-10-CM

## 2024-07-09 DIAGNOSIS — I25.118 CORONARY ARTERY DISEASE OF NATIVE ARTERY OF NATIVE HEART WITH STABLE ANGINA PECTORIS (H): ICD-10-CM

## 2024-07-09 LAB
ERYTHROCYTE [DISTWIDTH] IN BLOOD BY AUTOMATED COUNT: 13 % (ref 10–15)
HCT VFR BLD AUTO: 42.9 % (ref 40–53)
HGB BLD-MCNC: 14.9 G/DL (ref 13.3–17.7)
MCH RBC QN AUTO: 31.5 PG (ref 26.5–33)
MCHC RBC AUTO-ENTMCNC: 34.7 G/DL (ref 31.5–36.5)
MCV RBC AUTO: 91 FL (ref 78–100)
PLATELET # BLD AUTO: 177 10E3/UL (ref 150–450)
RBC # BLD AUTO: 4.73 10E6/UL (ref 4.4–5.9)
WBC # BLD AUTO: 4.9 10E3/UL (ref 4–11)

## 2024-07-09 PROCEDURE — 36415 COLL VENOUS BLD VENIPUNCTURE: CPT | Performed by: INTERNAL MEDICINE

## 2024-07-09 PROCEDURE — 99213 OFFICE O/P EST LOW 20 MIN: CPT | Mod: 25 | Performed by: INTERNAL MEDICINE

## 2024-07-09 PROCEDURE — G0103 PSA SCREENING: HCPCS | Performed by: INTERNAL MEDICINE

## 2024-07-09 PROCEDURE — G0439 PPPS, SUBSEQ VISIT: HCPCS | Performed by: INTERNAL MEDICINE

## 2024-07-09 PROCEDURE — 85027 COMPLETE CBC AUTOMATED: CPT | Performed by: INTERNAL MEDICINE

## 2024-07-09 PROCEDURE — 84550 ASSAY OF BLOOD/URIC ACID: CPT | Performed by: INTERNAL MEDICINE

## 2024-07-09 PROCEDURE — 80053 COMPREHEN METABOLIC PANEL: CPT | Performed by: INTERNAL MEDICINE

## 2024-07-09 PROCEDURE — 80061 LIPID PANEL: CPT | Performed by: INTERNAL MEDICINE

## 2024-07-09 RX ORDER — ALLOPURINOL 100 MG/1
100 TABLET ORAL DAILY
Qty: 90 TABLET | Refills: 3 | Status: SHIPPED | OUTPATIENT
Start: 2024-07-09

## 2024-07-09 ASSESSMENT — PAIN SCALES - GENERAL: PAINLEVEL: NO PAIN (0)

## 2024-07-09 NOTE — PROGRESS NOTES
Preventive Care Visit  [facility]  Mikie Lazaro MD, MD, Internal Medicine  Jul 9, 2024        Carrol Saxena is a 69 year old, presenting for the following:  Physical          Health Care Directive  Patient does not have a Health Care Directive or Living Will: Discussed advance care planning with patient; however, patient declined at this time.    HPI          7/8/2024   General Health   How would you rate your overall physical health? Excellent   Feel stress (tense, anxious, or unable to sleep) Not at all            7/8/2024   Nutrition   Diet: Low salt    Low fat/cholesterol    Vegetarian/vegan    Gluten-free/reduced       Multiple values from one day are sorted in reverse-chronological order         7/8/2024   Exercise   Days per week of moderate/strenous exercise 5 days   Average minutes spent exercising at this level 70 min            7/8/2024   Social Factors   Frequency of gathering with friends or relatives Three times a week   Worry food won't last until get money to buy more No   Food not last or not have enough money for food? No   Do you have housing? (Housing is defined as stable permanent housing and does not include staying ouside in a car, in a tent, in an abandoned building, in an overnight shelter, or couch-surfing.) Yes   Are you worried about losing your housing? No   Lack of transportation? No   Unable to get utilities (heat,electricity)? No            7/8/2024   Fall Risk   Fallen 2 or more times in the past year? No    No   Trouble with walking or balance? No    No       Multiple values from one day are sorted in reverse-chronological order          7/8/2024   Activities of Daily Living- Home Safety   Needs help with the following daily activites None of the above   Safety concerns in the home None of the above            7/8/2024   Dental   Dentist two times every year? Yes            7/8/2024   Hearing Screening   Hearing concerns? (!) IT'S HARD TO FOLLOW A  CONVERSATION IN A NOISY RESTAURANT OR CROWDED ROOM.            7/8/2024   Driving Risk Screening   Patient/family members have concerns about driving No            7/8/2024   General Alertness/Fatigue Screening   Have you been more tired than usual lately? No            7/8/2024   Urinary Incontinence Screening   Bothered by leaking urine in past 6 months No            7/8/2024   TB Screening   Were you born outside of the [place]? No            Today's PHQ-2 Score:       7/8/2024     9:49 PM   PHQ-2 ( 1999 [employer])   Q1: Little interest or pleasure in doing things 0   Q2: Feeling down, depressed or hopeless 0   PHQ-2 Score 0   Q1: Little interest or pleasure in doing things Not at all   Q2: Feeling down, depressed or hopeless Not at all   PHQ-2 Score 0           7/8/2024   Substance Use   Alcohol more than 3/day or more than 7/wk No   Do you have a current opioid prescription? No   How severe/bad is pain from 1 to 10? 0/10 (No Pain)   Do you use any other substances recreationally? No        Social History     Tobacco Use    Smoking status: Never    Smokeless tobacco: Never   Vaping Use    Vaping status: Never Used   Substance Use Topics    Alcohol use: Not Currently     Alcohol/week: 6.0 standard drinks of alcohol     Types: 6 Standard drinks or equivalent per week     Comment: No    Drug use: No           7/8/2024   AAA Screening   Family history of Abdominal Aortic Aneurysm (AAA)? No      ASCVD Risk   The ASCVD Risk score (Lee BERMUDEZ, et al., 2019) failed to calculate for the following reasons:    The patient has a prior MI or stroke diagnosis        Reviewed and updated as needed this visit by Provider                    Past Medical History:   Diagnosis Date    Acute gouty arthritis     Aortic root dilatation (H24)     Ascending aorta dilatation (H24)     Chest pain     Coronary artery disease     Gout 11/24/2020    Hyperlipidaemia     Lower GI bleed 09/30/2013    NSTEMI (non-ST elevated myocardial  infarction) (H) 11/24/2020    Osteoarthritis     s/p right IRENE 11/26/19 at [facility]    S/P CABG x 5 2013    LIMA-LAD, SVG-PDA, SVG-PL, SVG-OM1, SVG- OM2 (9/2013)    Shortness of breath     Stable angina (H24)     Status post coronary angiogram 09/21/2021    Status post coronary angiogram 09/21/2021    Unstable angina (H) 11/24/2020    Urinary retention      Current providers sharing in care for this patient include:  Patient Care Team:  Mikie Lazaro MD as PCP - General (Internal Medicine)  Mikie Lazaro MD as Assigned PCP  Armani Perez MD as Assigned Heart and Vascular Provider  Armani Perez MD as MD (Cardiovascular Disease)    The following health maintenance items are reviewed in Epic and correct as of today:  Health Maintenance   Topic Date Due    RSV VACCINE (Pregnancy & 60+) (1 - 1-dose 60+ series) Never done    COVID-19 Vaccine (4 - 2023-24 season) 09/01/2023    MEDICARE ANNUAL WELLNESS VISIT  04/25/2024    CMP  04/25/2024    LIPID  04/25/2024    PSA  04/25/2024    ANNUAL REVIEW OF HM ORDERS  04/25/2024    CBC  04/25/2024    INFLUENZA VACCINE (1) 09/01/2024    FALL RISK ASSESSMENT  07/09/2025    GLUCOSE  04/25/2026    ADVANCE CARE PLANNING  03/30/2027    DTAP/TDAP/TD IMMUNIZATION (3 - Td or Tdap) 10/29/2029    COLORECTAL CANCER SCREENING  11/10/2033    HEPATITIS C SCREENING  Completed    PHQ-2 (once per calendar year)  Completed    Pneumococcal Vaccine: 65+ Years  Completed    ZOSTER IMMUNIZATION  Completed    IPV IMMUNIZATION  Aged Out    HPV IMMUNIZATION  Aged Out    MENINGITIS IMMUNIZATION  Aged Out    RSV MONOCLONAL ANTIBODY  Aged Out          Coronary artery disease of native artery of native heart with stable angina pectoris (H24)   Odessa LOPEZ Lexi is doing very well.  Notes occasional anginal pain with biking up a hill in the cold, but avoidable with warming up.  He is also paddle boarding regularly  Acute gouty arthritis   No recent gout flares.  Has continued on  "allopurinol daily.  Notes alcohol does trigger and using colchicine    Review of Systems  Constitutional, HEENT, cardiovascular, pulmonary, GI, , musculoskeletal, neuro, skin, endocrine and psych systems are negative, except as otherwise noted.     Objective    Exam  /75 (BP Location: Right arm, Patient Position: Sitting, Cuff Size: Adult Regular)   Pulse 66   Temp 97.1  F (36.2  C) (Tympanic)   Resp 14   Ht 1.803 m (5' 11\")   Wt 77.4 kg (170 lb 9.6 oz)   SpO2 99%   BMI 23.79 kg/m     Estimated body mass index is 23.79 kg/m  as calculated from the following:    Height as of this encounter: 1.803 m (5' 11\").    Weight as of this encounter: 77.4 kg (170 lb 9.6 oz).    Physical Exam  GENERAL: alert and no distress  EYES: Eyes grossly normal to inspection, PERRL and conjunctivae and sclerae normal  HENT: ear canals and TM's normal, nose and mouth without ulcers or lesions  NECK: no adenopathy, no asymmetry, masses, or scars  RESP: lungs clear to auscultation - no rales, rhonchi or wheezes  CV: regular rate and rhythm, normal S1 S2, no S3 or S4, no murmur, click or rub, no peripheral edema  ABDOMEN: soft, nontender, no hepatosplenomegaly, no masses and bowel sounds normal  MS: no gross musculoskeletal defects noted, no edema  SKIN: no suspicious lesions or rashes  NEURO: Normal strength and tone, mentation intact and speech normal  PSYCH: mentation appears normal, affect normal/bright         7/9/2024   Mini Cog   Clock Draw Score 2 Normal   3 Item Recall 3 objects recalled   Mini Cog Total Score 5            Patient Instructions   (Z00.00) Routine general medical examination at a health care facility  (primary encounter diagnosis)  Comment: For routine exam, we will draw labs as ordered, cholesterol, diabetes mellitus check, liver function, renal function, PSA.  We will also update vaccination history.  RSV vaccine advised.  Plan:     (I25.10) Coronary artery disease involving native coronary artery of " native heart without angina pectoris  Comment: Continue follow-up cardiology.  Continue statin, beta-blocker aspirin and Imdur  Plan:     (I25.118) Coronary artery disease of native artery of native heart with stable angina pectoris (H24)  Comment: As above  Plan:     (M10.9) Acute gouty arthritis  Comment: Recheck uric acid level and continue allopurinol  Plan:     (Z12.5) Screening for prostate cancer  Comment: PSA ordered  Plan:            Signed Electronically by: Mikie Lazaro MD, MD

## 2024-07-09 NOTE — PATIENT INSTRUCTIONS
(Z00.00) Routine general medical examination at a health care facility  (primary encounter diagnosis)  Comment: For routine exam, we will draw labs as ordered, cholesterol, diabetes mellitus check, liver function, renal function, PSA.  We will also update vaccination history.  RSV vaccine advised.  Plan:     (I25.10) Coronary artery disease involving native coronary artery of native heart without angina pectoris  Comment: Continue follow-up cardiology.  Continue statin, beta-blocker aspirin and Imdur  Plan:     (I25.118) Coronary artery disease of native artery of native heart with stable angina pectoris (H24)  Comment: As above  Plan:     (M10.9) Acute gouty arthritis  Comment: Recheck uric acid level and continue allopurinol  Plan:     (Z12.5) Screening for prostate cancer  Comment: PSA ordered  Plan:

## 2024-07-09 NOTE — Clinical Note
Please abstract the following data from this visit with this patient into the appropriate field in Epic:  Tests that can be patient reported without a hard copy:  Colonoscopy done on this date: 11/10/2023 (approximately), by this group: [facility].

## 2024-07-10 LAB
ALBUMIN SERPL BCG-MCNC: 4.5 G/DL (ref 3.5–5.2)
ALP SERPL-CCNC: 83 U/L (ref 40–150)
ALT SERPL W P-5'-P-CCNC: 30 U/L (ref 0–70)
ANION GAP SERPL CALCULATED.3IONS-SCNC: 9 MMOL/L (ref 7–15)
AST SERPL W P-5'-P-CCNC: 37 U/L (ref 0–45)
BILIRUB SERPL-MCNC: 0.6 MG/DL
BUN SERPL-MCNC: 11.6 MG/DL (ref 8–23)
CALCIUM SERPL-MCNC: 9.4 MG/DL (ref 8.8–10.2)
CHLORIDE SERPL-SCNC: 104 MMOL/L (ref 98–107)
CHOLEST SERPL-MCNC: 137 MG/DL
CREAT SERPL-MCNC: 0.89 MG/DL (ref 0.67–1.17)
DEPRECATED HCO3 PLAS-SCNC: 26 MMOL/L (ref 22–29)
EGFRCR SERPLBLD CKD-EPI 2021: >90 ML/MIN/1.73M2
FASTING STATUS PATIENT QL REPORTED: NO
FASTING STATUS PATIENT QL REPORTED: NO
GLUCOSE SERPL-MCNC: 82 MG/DL (ref 70–99)
HDLC SERPL-MCNC: 57 MG/DL
LDLC SERPL CALC-MCNC: 64 MG/DL
NONHDLC SERPL-MCNC: 80 MG/DL
POTASSIUM SERPL-SCNC: 4.4 MMOL/L (ref 3.4–5.3)
PROT SERPL-MCNC: 6.8 G/DL (ref 6.4–8.3)
PSA SERPL DL<=0.01 NG/ML-MCNC: 1.2 NG/ML (ref 0–6.5)
SODIUM SERPL-SCNC: 139 MMOL/L (ref 135–145)
TRIGL SERPL-MCNC: 79 MG/DL
URATE SERPL-MCNC: 5.1 MG/DL (ref 3.4–7)

## 2024-12-03 ENCOUNTER — HOSPITAL ENCOUNTER (OUTPATIENT)
Dept: CARDIOLOGY | Facility: CLINIC | Age: 69
Discharge: HOME | End: 2024-12-03
Attending: INTERNAL MEDICINE
Payer: COMMERCIAL

## 2024-12-03 VITALS — HEART RATE: 63 BPM | DIASTOLIC BLOOD PRESSURE: 82 MMHG | SYSTOLIC BLOOD PRESSURE: 127 MMHG

## 2024-12-03 DIAGNOSIS — I77.810 ASCENDING AORTA DILATATION (H): ICD-10-CM

## 2024-12-03 PROCEDURE — 250N000011 HC RX IP 250 OP 636: Performed by: INTERNAL MEDICINE

## 2024-12-03 PROCEDURE — 71275 CT ANGIOGRAPHY CHEST: CPT

## 2024-12-03 RX ORDER — METOPROLOL TARTRATE 1 MG/ML
5-20 INJECTION, SOLUTION INTRAVENOUS
Status: DISCONTINUED | OUTPATIENT
Start: 2024-12-03 | End: 2024-12-04 | Stop reason: HOSPADM

## 2024-12-03 RX ORDER — METOPROLOL TARTRATE 25 MG/1
25-100 TABLET, FILM COATED ORAL
Status: DISCONTINUED | OUTPATIENT
Start: 2024-12-03 | End: 2024-12-04 | Stop reason: HOSPADM

## 2024-12-03 RX ORDER — IOPAMIDOL 755 MG/ML
500 INJECTION, SOLUTION INTRAVASCULAR ONCE
Status: COMPLETED | OUTPATIENT
Start: 2024-12-03 | End: 2024-12-03

## 2024-12-03 RX ORDER — DILTIAZEM HCL 60 MG
120 TABLET ORAL
Status: DISCONTINUED | OUTPATIENT
Start: 2024-12-03 | End: 2024-12-04 | Stop reason: HOSPADM

## 2024-12-03 RX ORDER — ONDANSETRON 2 MG/ML
4 INJECTION INTRAMUSCULAR; INTRAVENOUS
Status: DISCONTINUED | OUTPATIENT
Start: 2024-12-03 | End: 2024-12-04 | Stop reason: HOSPADM

## 2024-12-03 RX ORDER — IVABRADINE 5 MG/1
5-15 TABLET, FILM COATED ORAL
Status: DISCONTINUED | OUTPATIENT
Start: 2024-12-03 | End: 2024-12-04 | Stop reason: HOSPADM

## 2024-12-03 RX ORDER — DILTIAZEM HYDROCHLORIDE 5 MG/ML
10-15 INJECTION INTRAVENOUS
Status: DISCONTINUED | OUTPATIENT
Start: 2024-12-03 | End: 2024-12-04 | Stop reason: HOSPADM

## 2024-12-03 RX ORDER — LIDOCAINE 40 MG/G
CREAM TOPICAL
Status: DISCONTINUED | OUTPATIENT
Start: 2024-12-03 | End: 2024-12-04 | Stop reason: HOSPADM

## 2024-12-03 RX ORDER — NITROGLYCERIN 0.4 MG/1
0.4 TABLET SUBLINGUAL
Status: DISCONTINUED | OUTPATIENT
Start: 2024-12-03 | End: 2024-12-04 | Stop reason: HOSPADM

## 2024-12-03 RX ADMIN — IOPAMIDOL 100 ML: 755 INJECTION, SOLUTION INTRAVENOUS at 14:58

## 2024-12-24 DIAGNOSIS — I25.10 CORONARY ARTERY DISEASE INVOLVING NATIVE CORONARY ARTERY OF NATIVE HEART WITHOUT ANGINA PECTORIS: ICD-10-CM

## 2024-12-24 DIAGNOSIS — I25.118 CORONARY ARTERY DISEASE OF NATIVE ARTERY OF NATIVE HEART WITH STABLE ANGINA PECTORIS (H): ICD-10-CM

## 2024-12-24 RX ORDER — ISOSORBIDE MONONITRATE 30 MG/1
90 TABLET, EXTENDED RELEASE ORAL DAILY
Qty: 270 TABLET | Refills: 0 | Status: SHIPPED | OUTPATIENT
Start: 2024-12-24

## 2024-12-24 RX ORDER — ATORVASTATIN CALCIUM 80 MG/1
80 TABLET, FILM COATED ORAL DAILY
Qty: 90 TABLET | Refills: 0 | Status: SHIPPED | OUTPATIENT
Start: 2024-12-24

## 2024-12-30 DIAGNOSIS — I25.10 CORONARY ARTERY DISEASE INVOLVING NATIVE CORONARY ARTERY OF NATIVE HEART WITHOUT ANGINA PECTORIS: ICD-10-CM

## 2024-12-30 RX ORDER — LISINOPRIL 2.5 MG/1
2.5 TABLET ORAL DAILY
Qty: 90 TABLET | Refills: 0 | Status: SHIPPED | OUTPATIENT
Start: 2024-12-30

## 2025-03-11 DIAGNOSIS — I25.118 CORONARY ARTERY DISEASE OF NATIVE ARTERY OF NATIVE HEART WITH STABLE ANGINA PECTORIS: ICD-10-CM

## 2025-03-11 DIAGNOSIS — I25.10 CORONARY ARTERY DISEASE INVOLVING NATIVE CORONARY ARTERY OF NATIVE HEART WITHOUT ANGINA PECTORIS: ICD-10-CM

## 2025-03-11 RX ORDER — ISOSORBIDE MONONITRATE 30 MG/1
90 TABLET, EXTENDED RELEASE ORAL DAILY
Qty: 270 TABLET | Refills: 0 | Status: SHIPPED | OUTPATIENT
Start: 2025-03-11

## 2025-03-11 RX ORDER — LISINOPRIL 2.5 MG/1
2.5 TABLET ORAL DAILY
Qty: 90 TABLET | Refills: 0 | Status: SHIPPED | OUTPATIENT
Start: 2025-03-11

## 2025-03-19 ENCOUNTER — OFFICE VISIT (OUTPATIENT)
Dept: CARDIOLOGY | Facility: CLINIC | Age: 70
End: 2025-03-19
Payer: COMMERCIAL

## 2025-03-19 VITALS
BODY MASS INDEX: 24.19 KG/M2 | SYSTOLIC BLOOD PRESSURE: 119 MMHG | HEART RATE: 60 BPM | HEIGHT: 71 IN | WEIGHT: 172.8 LBS | DIASTOLIC BLOOD PRESSURE: 74 MMHG | OXYGEN SATURATION: 99 %

## 2025-03-19 DIAGNOSIS — I25.10 CORONARY ARTERY DISEASE INVOLVING NATIVE CORONARY ARTERY OF NATIVE HEART WITHOUT ANGINA PECTORIS: ICD-10-CM

## 2025-03-19 DIAGNOSIS — I25.118 CORONARY ARTERY DISEASE OF NATIVE ARTERY OF NATIVE HEART WITH STABLE ANGINA PECTORIS: Primary | ICD-10-CM

## 2025-03-19 DIAGNOSIS — I77.810 ASCENDING AORTA DILATATION: ICD-10-CM

## 2025-03-19 RX ORDER — LISINOPRIL 2.5 MG/1
2.5 TABLET ORAL DAILY
Qty: 90 TABLET | Refills: 4 | Status: SHIPPED | OUTPATIENT
Start: 2025-03-19

## 2025-03-19 RX ORDER — ISOSORBIDE MONONITRATE 30 MG/1
90 TABLET, EXTENDED RELEASE ORAL DAILY
Qty: 270 TABLET | Refills: 4 | Status: SHIPPED | OUTPATIENT
Start: 2025-03-19

## 2025-03-19 RX ORDER — ATORVASTATIN CALCIUM 80 MG/1
80 TABLET, FILM COATED ORAL DAILY
Qty: 90 TABLET | Refills: 4 | Status: SHIPPED | OUTPATIENT
Start: 2025-03-19

## 2025-03-19 RX ORDER — NITROGLYCERIN 0.4 MG/1
TABLET SUBLINGUAL
Qty: 30 TABLET | Refills: 1 | Status: SHIPPED | OUTPATIENT
Start: 2025-03-19

## 2025-03-19 RX ORDER — METOPROLOL SUCCINATE 25 MG/1
12.5 TABLET, EXTENDED RELEASE ORAL DAILY
Qty: 45 TABLET | Refills: 4 | Status: SHIPPED | OUTPATIENT
Start: 2025-03-19

## 2025-03-19 NOTE — LETTER
3/19/2025    Mikie Lazaro MD, MD  [address]  [facility] [address]    RE: Odessa Elliott       Dear Colleague,     I had the pleasure of seeing Odessa Elliott in the [facility].  CARDIOLOGY CLINIC CONSULTATION    PRIMARY CARE PHYSICIAN:  Mikie Lazaro MD    HISTORY OF PRESENT ILLNESS:  I had the pleasure of seeing Odessa Elliott at [facility] in [place] this morning.  He is a very pleasant 69-year-old gentleman who is well known to me.  He has known coronary artery disease, hyperlipidemia and aortic root dilatation.      In 2013, we evaluated him for unstable angina.  Coronary angiogram at that time revealed severe 3 vessel coronary artery disease. He subsequently underwent coronary artery bypass grafting surgery x5 with a LIMA to LAD, vein graft to the right posterior descending artery, vein graft to the right posterolateral branch, vein graft to the obtuse marginal 1 and vein graft to the obtuse marginal branch 2.  He did well the years following his surgery.     In October of 2020, he started noticing exertional chest burning.  The episodes were initially brought on by strenuous activity, but subsequently came on with his usual walks.  His wife who is a nurse was not aware of his symptoms.  When he mentioned those symptoms, she brought him to the emergency department. His troponin was mildly elevated without significant delta.  He then proceeded to have coronary and graft angiography, which I have personally reviewed.  The angiogram revealed severe native vessel coronary artery disease as expected.  His LIMA to the LAD was widely patent.  His SVG to RCA was also widely patent.  The SVG to the right posterolateral branch was patent, although the graft was small.  The vein graft to the first obtuse marginal branch was patent, and the SVG to the second obtuse marginal branch was patent.  His RCA was chronically occluded.  His left main had moderate  to severe disease, and the proximal LAD, which supplied a moderate-sized diagonal branch, had 95% calcific stenosis.     After his angiogram, the patient was commenced on medical therapy.  He was initially on Imdur 30 mg daily with improved symptoms.  Subsequently, I saw him in February 2021.  At that time he was still complaining of chest pain with exertion.  I then increased his Imdur to 60 mg daily.  He mentioned stable but ongoing chest discomfort on our next visit which was August 2021.  He subsequently proceeded to have repeat angiogram in September of 2021. An attempt to stent the diagonal branch was unsuccessful.  The lesion was heavily calcified. We were able to wire it but could not cross it with a balloon.  Given the size of the vessel and the potential need of atherectomy we took the patient off the table.  I then saw him in November of 2021 to discuss escalation of medical therapy versus complex intervention.  We elected to increase his Imdur to 90 mg daily.  Since then his symptoms have dramatically improved.  He notices occasional chest tightness with high workload.  No symptoms with his usual activities.    With regards to his aortic root dilatation, echocardiogram from 2022 showed aortic root dilatation measuring 4.6 cm.  Echocardiogram from 2023 measured the aortic root size much smaller.  We had him undergo CT a of the chest few months ago.  The CTA measured the aortic root 4.7 cm.  He denies any symptoms from this.    He has risk factors are well-controlled although his last few lipid profile showed an LDL greater than 70.    PAST MEDICAL HISTORY:  Past Medical History:   Diagnosis Date     Acute gouty arthritis      Aortic root dilatation      Ascending aorta dilatation      Chest pain      Coronary artery disease      Gout 11/24/2020     Hyperlipidaemia      Lower GI bleed 09/30/2013     NSTEMI (non-ST elevated myocardial infarction) (H) 11/24/2020     Osteoarthritis     s/p right IRENE 11/26/19  at [facility]     S/P CABG x 5 2013    LIMA-LAD, SVG-PDA, SVG-PL, SVG-OM1, SVG- OM2 (9/2013)     Shortness of breath      Stable angina      Status post coronary angiogram 09/21/2021     Status post coronary angiogram 09/21/2021     Unstable angina (H) 11/24/2020     Urinary retention        MEDICATIONS:  Current Outpatient Medications   Medication Sig Dispense Refill     allopurinol (ZYLOPRIM) 100 MG tablet Take 1 tablet (100 mg) by mouth daily 90 tablet 3     aspirin 81 MG tablet Take 81 mg by mouth daily OTC       atorvastatin (LIPITOR) 80 MG tablet Take 1 tablet (80 mg) by mouth daily. 90 tablet 4     colchicine (COLCRYS) 0.6 MG tablet TAKE 2 TABLETS AT THE FIRST SIGN OF A GOUT ATTACK, THEN 1 TABLET 1 HOUR LATER, MAX 3 TABLETS OVER 1 HOUR 30 tablet 4     isosorbide mononitrate (IMDUR) 30 MG 24 hr tablet Take 3 tablets (90 mg) by mouth daily. 270 tablet 4     lisinopril (ZESTRIL) 2.5 MG tablet Take 1 tablet (2.5 mg) by mouth daily. 90 tablet 4     metoprolol succinate ER (TOPROL XL) 25 MG 24 hr tablet Take 0.5 tablets (12.5 mg) by mouth daily. 45 tablet 4     Multiple Vitamin (MULTI VITAMIN DAILY PO) Take 1 tablet by mouth daily       nitroGLYcerin (NITROSTAT) 0.4 MG sublingual tablet For chest pain place 1 tablet under the tongue every 5 minutes for 3 doses. If symptoms persist 5 minutes after 1st dose call 911. 30 tablet 1     NONFORMULARY Take by mouth daily Collagen 1 scoop       vitamin C (ASCORBIC ACID) 1000 MG TABS Take 1,000 mg by mouth daily        Vitamin D, Cholecalciferol, 1000 UNITS TABS Take 2,000 Units by mouth daily OTC       No current facility-administered medications for this visit.       SOCIAL HISTORY:  I have reviewed this patient's social history and updated it with pertinent information if needed. Odessa Elliott  reports that he has never smoked. He has never used smokeless tobacco. He reports that he does not currently use alcohol after a past usage of about 6.0 standard drinks of  alcohol per week. He reports that he does not use drugs.    PHYSICAL EXAM:  Pulse:  [60] 60  BP: (119)/(74) 119/74  SpO2:  [99 %] 99 %  172 lbs 12.8 oz    Constitutional: alert, no distress  Respiratory: Good bilateral air entry  Cardiovascular: Regular rate and rhythm, no murmurs  GI: nondistended  Neuropsychiatric: appropriate affact    ASSESSMENT: Pertinent issues addressed/ reviewed during this cardiology visit  Coronary disease status post CABG x 5 in September 2023  Chronic stable angina with high workloads  Hyperlipidemia, treated  Hypertension  Mild to moderate ascending aorta and aortic root dilatation    RECOMMENDATIONS:  His stable angina symptoms have not changed over the past year.  He only gets mild chest discomfort with high workloads, especially when he starts exercising.  Symptoms usually go away without any intervention.  He remains on Imdur as well as beta-blocker.  Again reviewed indications for revascularization. Given stable symptoms and potential high risk intervention of the heavily calcified diagonal branch, I again recommended continued medical therapy.  We reviewed his CTA. The aortic root measured 4.7 cm. This is most likely degenerative in nature but I offered him for genetic testing.  He deferred testing for now.  Repeat CTA in 1 year.  Reviewed symptoms of acute aortic syndromes.  Blood pressure is well-controlled.  Cholesterol is well-controlled with most recent LDL being 64 mg/dL.  Follow-up in 1 year but sooner if he develops worsening cardiac symptoms.    It was a pleasure seeing this patient in clinic today. Please do not hesitate to contact me with any future questions.     Armani Perez MD, [facility]  Cardiology - [facility]  February 9, 2024    Review of the result(s) of each unique test - Last echocardiogram, ECG, lipid profile, BMP     The level of medical decision making during this visit was of moderate complexity.    The longitudinal plan of care for the diagnosis(es)/condition(s)  as documented were addressed during this visit. Due to the added complexity in care, I will continue to support Odessa in the subsequent management and with ongoing continuity of care.     This note was completed in part using dictation via the Dragon voice recognition software. Some word and grammatical errors may occur and must be interpreted in the appropriate clinical context.  If there are any questions pertaining to this issue, please contact me for further clarification.      Thank you for allowing me to participate in the care of your patient.      Sincerely,     Armani Perez MD, MD     [facility]  cc:   Armani Perez MD  [address]  [place] [address]

## 2025-03-19 NOTE — PROGRESS NOTES
CARDIOLOGY CLINIC CONSULTATION    PRIMARY CARE PHYSICIAN:  Mikie Lazaro MD    HISTORY OF PRESENT ILLNESS:  I had the pleasure of seeing Odessa Elliott at [facility] in [place] this morning.  He is a very pleasant 69-year-old gentleman who is well known to me.  He has known coronary artery disease, hyperlipidemia and aortic root dilatation.      In 2013, we evaluated him for unstable angina.  Coronary angiogram at that time revealed severe 3 vessel coronary artery disease. He subsequently underwent coronary artery bypass grafting surgery x5 with a LIMA to LAD, vein graft to the right posterior descending artery, vein graft to the right posterolateral branch, vein graft to the obtuse marginal 1 and vein graft to the obtuse marginal branch 2.  He did well the years following his surgery.     In October of 2020, he started noticing exertional chest burning.  The episodes were initially brought on by strenuous activity, but subsequently came on with his usual walks.  His wife who is a nurse was not aware of his symptoms.  When he mentioned those symptoms, she brought him to the emergency department. His troponin was mildly elevated without significant delta.  He then proceeded to have coronary and graft angiography, which I have personally reviewed.  The angiogram revealed severe native vessel coronary artery disease as expected.  His LIMA to the LAD was widely patent.  His SVG to RCA was also widely patent.  The SVG to the right posterolateral branch was patent, although the graft was small.  The vein graft to the first obtuse marginal branch was patent, and the SVG to the second obtuse marginal branch was patent.  His RCA was chronically occluded.  His left main had moderate to severe disease, and the proximal LAD, which supplied a moderate-sized diagonal branch, had 95% calcific stenosis.     After his angiogram, the patient was commenced on medical therapy.  He was initially on Imdur  30 mg daily with improved symptoms.  Subsequently, I saw him in February 2021.  At that time he was still complaining of chest pain with exertion.  I then increased his Imdur to 60 mg daily.  He mentioned stable but ongoing chest discomfort on our next visit which was August 2021.  He subsequently proceeded to have repeat angiogram in September of 2021. An attempt to stent the diagonal branch was unsuccessful.  The lesion was heavily calcified. We were able to wire it but could not cross it with a balloon.  Given the size of the vessel and the potential need of atherectomy we took the patient off the table.  I then saw him in November of 2021 to discuss escalation of medical therapy versus complex intervention.  We elected to increase his Imdur to 90 mg daily.  Since then his symptoms have dramatically improved.  He notices occasional chest tightness with high workload.  No symptoms with his usual activities.    With regards to his aortic root dilatation, echocardiogram from 2022 showed aortic root dilatation measuring 4.6 cm.  Echocardiogram from 2023 measured the aortic root size much smaller.  We had him undergo CT a of the chest few months ago.  The CTA measured the aortic root 4.7 cm.  He denies any symptoms from this.    He has risk factors are well-controlled although his last few lipid profile showed an LDL greater than 70.    PAST MEDICAL HISTORY:  Past Medical History:   Diagnosis Date    Acute gouty arthritis     Aortic root dilatation     Ascending aorta dilatation     Chest pain     Coronary artery disease     Gout 11/24/2020    Hyperlipidaemia     Lower GI bleed 09/30/2013    NSTEMI (non-ST elevated myocardial infarction) (H) 11/24/2020    Osteoarthritis     s/p right IRENE 11/26/19 at [facility]    S/P CABG x 5 2013    LIMA-LAD, SVG-PDA, SVG-PL, SVG-OM1, SVG- OM2 (9/2013)    Shortness of breath     Stable angina     Status post coronary angiogram 09/21/2021    Status post coronary angiogram  09/21/2021    Unstable angina (H) 11/24/2020    Urinary retention        MEDICATIONS:  Current Outpatient Medications   Medication Sig Dispense Refill    allopurinol (ZYLOPRIM) 100 MG tablet Take 1 tablet (100 mg) by mouth daily 90 tablet 3    aspirin 81 MG tablet Take 81 mg by mouth daily OTC      atorvastatin (LIPITOR) 80 MG tablet Take 1 tablet (80 mg) by mouth daily. 90 tablet 4    colchicine (COLCRYS) 0.6 MG tablet TAKE 2 TABLETS AT THE FIRST SIGN OF A GOUT ATTACK, THEN 1 TABLET 1 HOUR LATER, MAX 3 TABLETS OVER 1 HOUR 30 tablet 4    isosorbide mononitrate (IMDUR) 30 MG 24 hr tablet Take 3 tablets (90 mg) by mouth daily. 270 tablet 4    lisinopril (ZESTRIL) 2.5 MG tablet Take 1 tablet (2.5 mg) by mouth daily. 90 tablet 4    metoprolol succinate ER (TOPROL XL) 25 MG 24 hr tablet Take 0.5 tablets (12.5 mg) by mouth daily. 45 tablet 4    Multiple Vitamin (MULTI VITAMIN DAILY PO) Take 1 tablet by mouth daily      nitroGLYcerin (NITROSTAT) 0.4 MG sublingual tablet For chest pain place 1 tablet under the tongue every 5 minutes for 3 doses. If symptoms persist 5 minutes after 1st dose call 911. 30 tablet 1    NONFORMULARY Take by mouth daily Collagen 1 scoop      vitamin C (ASCORBIC ACID) 1000 MG TABS Take 1,000 mg by mouth daily       Vitamin D, Cholecalciferol, 1000 UNITS TABS Take 2,000 Units by mouth daily OTC       No current facility-administered medications for this visit.       SOCIAL HISTORY:  I have reviewed this patient's social history and updated it with pertinent information if needed. Odessa Elliott  reports that he has never smoked. He has never used smokeless tobacco. He reports that he does not currently use alcohol after a past usage of about 6.0 standard drinks of alcohol per week. He reports that he does not use drugs.    PHYSICAL EXAM:  Pulse:  [60] 60  BP: (119)/(74) 119/74  SpO2:  [99 %] 99 %  172 lbs 12.8 oz    Constitutional: alert, no distress  Respiratory: Good bilateral air  entry  Cardiovascular: Regular rate and rhythm, no murmurs  GI: nondistended  Neuropsychiatric: appropriate affact    ASSESSMENT: Pertinent issues addressed/ reviewed during this cardiology visit  Coronary disease status post CABG x 5 in September 2023  Chronic stable angina with high workloads  Hyperlipidemia, treated  Hypertension  Mild to moderate ascending aorta and aortic root dilatation    RECOMMENDATIONS:  His stable angina symptoms have not changed over the past year.  He only gets mild chest discomfort with high workloads, especially when he starts exercising.  Symptoms usually go away without any intervention.  He remains on Imdur as well as beta-blocker.  Again reviewed indications for revascularization. Given stable symptoms and potential high risk intervention of the heavily calcified diagonal branch, I again recommended continued medical therapy.  We reviewed his CTA. The aortic root measured 4.7 cm. This is most likely degenerative in nature but I offered him for genetic testing.  He deferred testing for now.  Repeat CTA in 1 year.  Reviewed symptoms of acute aortic syndromes.  Blood pressure is well-controlled.  Cholesterol is well-controlled with most recent LDL being 64 mg/dL.  Follow-up in 1 year but sooner if he develops worsening cardiac symptoms.    It was a pleasure seeing this patient in clinic today. Please do not hesitate to contact me with any future questions.     Armani Perez MD, [facility]  Cardiology - [facility]  February 9, 2024    Review of the result(s) of each unique test - Last echocardiogram, ECG, lipid profile, BMP     The level of medical decision making during this visit was of moderate complexity.    The longitudinal plan of care for the diagnosis(es)/condition(s) as documented were addressed during this visit. Due to the added complexity in care, I will continue to support Odessa in the subsequent management and with ongoing continuity of care.     This note was completed in part using  dictation via the Dragon voice recognition software. Some word and grammatical errors may occur and must be interpreted in the appropriate clinical context.  If there are any questions pertaining to this issue, please contact me for further clarification.

## 2025-05-20 DIAGNOSIS — M10.9 ACUTE GOUTY ARTHRITIS: ICD-10-CM

## 2025-05-20 RX ORDER — ALLOPURINOL 100 MG/1
100 TABLET ORAL DAILY
Qty: 90 TABLET | Refills: 0 | Status: SHIPPED | OUTPATIENT
Start: 2025-05-20

## 2025-07-20 SDOH — HEALTH STABILITY: PHYSICAL HEALTH: ON AVERAGE, HOW MANY DAYS PER WEEK DO YOU ENGAGE IN MODERATE TO STRENUOUS EXERCISE (LIKE A BRISK WALK)?: 5 DAYS

## 2025-07-20 SDOH — HEALTH STABILITY: PHYSICAL HEALTH: ON AVERAGE, HOW MANY MINUTES DO YOU ENGAGE IN EXERCISE AT THIS LEVEL?: 60 MIN

## 2025-07-20 ASSESSMENT — SOCIAL DETERMINANTS OF HEALTH (SDOH): HOW OFTEN DO YOU GET TOGETHER WITH FRIENDS OR RELATIVES?: THREE TIMES A WEEK

## 2025-07-22 ENCOUNTER — OFFICE VISIT (OUTPATIENT)
Dept: FAMILY MEDICINE | Facility: CLINIC | Age: 70
End: 2025-07-22
Payer: COMMERCIAL

## 2025-07-22 VITALS
TEMPERATURE: 98.6 F | HEIGHT: 71 IN | WEIGHT: 171 LBS | DIASTOLIC BLOOD PRESSURE: 82 MMHG | BODY MASS INDEX: 23.94 KG/M2 | OXYGEN SATURATION: 99 % | HEART RATE: 63 BPM | SYSTOLIC BLOOD PRESSURE: 121 MMHG | RESPIRATION RATE: 10 BRPM

## 2025-07-22 DIAGNOSIS — M10.9 ACUTE GOUTY ARTHRITIS: ICD-10-CM

## 2025-07-22 DIAGNOSIS — Z13.6 SCREENING FOR CARDIOVASCULAR CONDITION: ICD-10-CM

## 2025-07-22 DIAGNOSIS — I25.10 CORONARY ARTERY DISEASE INVOLVING NATIVE CORONARY ARTERY OF NATIVE HEART WITHOUT ANGINA PECTORIS: ICD-10-CM

## 2025-07-22 DIAGNOSIS — Z00.00 ROUTINE GENERAL MEDICAL EXAMINATION AT A HEALTH CARE FACILITY: Primary | ICD-10-CM

## 2025-07-22 DIAGNOSIS — Z12.5 SCREENING FOR PROSTATE CANCER: ICD-10-CM

## 2025-07-22 DIAGNOSIS — M10.9 ACUTE GOUT, UNSPECIFIED CAUSE, UNSPECIFIED SITE: ICD-10-CM

## 2025-07-22 LAB
ALBUMIN SERPL BCG-MCNC: 4.6 G/DL (ref 3.5–5.2)
ALP SERPL-CCNC: 94 U/L (ref 40–150)
ALT SERPL W P-5'-P-CCNC: 35 U/L (ref 0–70)
ANION GAP SERPL CALCULATED.3IONS-SCNC: 10 MMOL/L (ref 7–15)
AST SERPL W P-5'-P-CCNC: 39 U/L (ref 0–45)
BILIRUB SERPL-MCNC: 0.7 MG/DL
BUN SERPL-MCNC: 13.1 MG/DL (ref 8–23)
CALCIUM SERPL-MCNC: 9.7 MG/DL (ref 8.8–10.4)
CHLORIDE SERPL-SCNC: 102 MMOL/L (ref 98–107)
CHOLEST SERPL-MCNC: 155 MG/DL
CREAT SERPL-MCNC: 0.99 MG/DL (ref 0.67–1.17)
EGFRCR SERPLBLD CKD-EPI 2021: 81 ML/MIN/1.73M2
ERYTHROCYTE [DISTWIDTH] IN BLOOD BY AUTOMATED COUNT: 12.9 % (ref 10–15)
FASTING STATUS PATIENT QL REPORTED: NO
FASTING STATUS PATIENT QL REPORTED: NO
GLUCOSE SERPL-MCNC: 82 MG/DL (ref 70–99)
HCO3 SERPL-SCNC: 26 MMOL/L (ref 22–29)
HCT VFR BLD AUTO: 42.2 % (ref 40–53)
HDLC SERPL-MCNC: 57 MG/DL
HGB BLD-MCNC: 14.7 G/DL (ref 13.3–17.7)
LDLC SERPL CALC-MCNC: 75 MG/DL
MCH RBC QN AUTO: 31.4 PG (ref 26.5–33)
MCHC RBC AUTO-ENTMCNC: 34.8 G/DL (ref 31.5–36.5)
MCV RBC AUTO: 90 FL (ref 78–100)
NONHDLC SERPL-MCNC: 98 MG/DL
PLATELET # BLD AUTO: 192 10E3/UL (ref 150–450)
POTASSIUM SERPL-SCNC: 4.7 MMOL/L (ref 3.4–5.3)
PROT SERPL-MCNC: 7 G/DL (ref 6.4–8.3)
PSA SERPL DL<=0.01 NG/ML-MCNC: 1.42 NG/ML (ref 0–6.5)
RBC # BLD AUTO: 4.68 10E6/UL (ref 4.4–5.9)
SODIUM SERPL-SCNC: 138 MMOL/L (ref 135–145)
TRIGL SERPL-MCNC: 117 MG/DL
URATE SERPL-MCNC: 5.3 MG/DL (ref 3.4–7)
WBC # BLD AUTO: 4.1 10E3/UL (ref 4–11)

## 2025-07-22 PROCEDURE — 3074F SYST BP LT 130 MM HG: CPT | Performed by: INTERNAL MEDICINE

## 2025-07-22 PROCEDURE — 80061 LIPID PANEL: CPT | Performed by: INTERNAL MEDICINE

## 2025-07-22 PROCEDURE — G0439 PPPS, SUBSEQ VISIT: HCPCS | Performed by: INTERNAL MEDICINE

## 2025-07-22 PROCEDURE — 80053 COMPREHEN METABOLIC PANEL: CPT | Performed by: INTERNAL MEDICINE

## 2025-07-22 PROCEDURE — 3048F LDL-C <100 MG/DL: CPT | Performed by: INTERNAL MEDICINE

## 2025-07-22 PROCEDURE — 84550 ASSAY OF BLOOD/URIC ACID: CPT | Performed by: INTERNAL MEDICINE

## 2025-07-22 PROCEDURE — 36415 COLL VENOUS BLD VENIPUNCTURE: CPT | Performed by: INTERNAL MEDICINE

## 2025-07-22 PROCEDURE — G0103 PSA SCREENING: HCPCS | Performed by: INTERNAL MEDICINE

## 2025-07-22 PROCEDURE — 3079F DIAST BP 80-89 MM HG: CPT | Performed by: INTERNAL MEDICINE

## 2025-07-22 PROCEDURE — 85027 COMPLETE CBC AUTOMATED: CPT | Performed by: INTERNAL MEDICINE

## 2025-07-22 PROCEDURE — 1126F AMNT PAIN NOTED NONE PRSNT: CPT | Performed by: INTERNAL MEDICINE

## 2025-07-22 RX ORDER — ALLOPURINOL 100 MG/1
100 TABLET ORAL DAILY
Qty: 90 TABLET | Refills: 3 | Status: SHIPPED | OUTPATIENT
Start: 2025-07-22

## 2025-07-22 RX ORDER — COLCHICINE 0.6 MG/1
TABLET ORAL
Qty: 30 TABLET | Refills: 3 | Status: SHIPPED | OUTPATIENT
Start: 2025-07-22

## 2025-07-22 ASSESSMENT — PAIN SCALES - GENERAL: PAINLEVEL_OUTOF10: NO PAIN (0)

## 2025-07-22 NOTE — PATIENT INSTRUCTIONS
(Z00.00) Routine general medical examination at a health care facility  (primary encounter diagnosis)  Comment: For routine exam, we will draw labs as ordered, cholesterol, diabetes mellitus check, liver function, renal function, PSA and plan for colonoscopy in November [facility] [phone].  We will also update vaccination history.  Plan:     (Z13.6) Screening for cardiovascular condition  Comment: check fasting lipid panel   Plan:     (I25.10) Coronary artery disease involving native coronary artery of native heart without angina pectoris  Comment: Continue staitn, beta blocker, aspirin and follow up in cardiology   Plan: Lipid panel reflex to direct LDL Non-fasting,         CBC with Platelets            (Z12.5) Screening for prostate cancer  Comment:   Plan: PROSTATE SPEC ANTIGEN SCREEN            (M10.9) Acute gout, unspecified cause, unspecified site  Comment: Continue allopurinol and check uric acid today   Plan: COMPREHENSIVE METABOLIC PANEL, CBC with         Platelets, Uric acid            (M10.9) Acute gouty arthritis  Comment: as above   Plan: COMPREHENSIVE METABOLIC PANEL, colchicine         (COLCRYS) 0.6 MG tablet, allopurinol (ZYLOPRIM)        100 MG tablet

## 2025-07-22 NOTE — PROGRESS NOTES
Preventive Care Visit  [facility]  Mikie Lazaro MD, MD, Internal Medicine  Jul 22, 2025      Carrol Saxena is a 70 year old, presenting for the following:  Physical           HPI       Advance Care Planning            7/20/2025   General Health   How would you rate your overall physical health? Good   Feel stress (tense, anxious, or unable to sleep) Not at all         7/20/2025   Nutrition   Diet: Regular (no restrictions)         7/20/2025   Exercise   Days per week of moderate/strenous exercise 5 days   Average minutes spent exercising at this level 60 min         7/20/2025   Social Factors   Frequency of gathering with friends or relatives Three times a week   Worry food won't last until get money to buy more No   Food not last or not have enough money for food? No   Do you have housing? (Housing is defined as stable permanent housing and does not include staying outside in a car, in a tent, in an abandoned building, in an overnight shelter, or couch-surfing.) Yes   Are you worried about losing your housing? No   Lack of transportation? No   Unable to get utilities (heat,electricity)? No         7/20/2025   Fall Risk   Fallen 2 or more times in the past year? No   Trouble with walking or balance? No          7/20/2025   Activities of Daily Living- Home Safety   Needs help with the following daily activites None of the above   Safety concerns in the home None of the above         7/20/2025   Dental   Dentist two times every year? Yes         7/20/2025   Hearing Screening   Hearing concerns? (!) I NEED TO ASK PEOPLE TO SPEAK UP OR REPEAT THEMSELVES.    (!) IT'S HARD TO FOLLOW A CONVERSATION IN A NOISY RESTAURANT OR CROWDED ROOM.    (!) TROUBLE UNDERSTANDING SOFT OR WHISPERED SPEECH.   Would you like a referral for hearing testing? I already have hearing aids       Multiple values from one day are sorted in reverse-chronological order         7/20/2025   Driving Risk Screening    Patient/family members have concerns about driving No         7/20/2025   General Alertness/Fatigue Screening   Have you been more tired than usual lately? No         7/20/2025   Urinary Incontinence Screening   Bothered by leaking urine in past 6 months No         Today's PHQ-2 Score:       7/22/2025     2:29 PM   PHQ-2 ( 1999 [employer])   Q1: Little interest or pleasure in doing things 0   Q2: Feeling down, depressed or hopeless 0   PHQ-2 Score 0    Q1: Little interest or pleasure in doing things Not at all   Q2: Feeling down, depressed or hopeless Not at all   PHQ-2 Score 0       Patient-reported           7/20/2025   Substance Use   Alcohol more than 3/day or more than 7/wk Not Applicable   Do you have a current opioid prescription? No   How severe/bad is pain from 1 to 10? 0/10 (No Pain)   Do you use any other substances recreationally? No     Social History     Tobacco Use    Smoking status: Never    Smokeless tobacco: Never   Vaping Use    Vaping status: Never Used   Substance Use Topics    Alcohol use: Not Currently     Alcohol/week: 6.0 standard drinks of alcohol     Types: 6 Standard drinks or equivalent per week     Comment: No    Drug use: No           7/20/2025   AAA Screening   Family history of Abdominal Aortic Aneurysm (AAA)? No   ASCVD Risk   The ASCVD Risk score (Lee BERMUDEZ, et al., 2019) failed to calculate for the following reasons:    Risk score cannot be calculated because patient has a medical history suggesting prior/existing ASCVD          Reviewed and updated as needed this visit by Provider                    Past Medical History:   Diagnosis Date    Acute gouty arthritis     Aortic root dilatation     Ascending aorta dilatation     Chest pain     Coronary artery disease     Gout 11/24/2020    Hyperlipidaemia     Lower GI bleed 09/30/2013    NSTEMI (non-ST elevated myocardial infarction) (H) 11/24/2020    Osteoarthritis     s/p right IRENE 11/26/19 at [facility]    S/P CABG x  "5 2013    LIMA-LAD, SVG-PDA, SVG-PL, SVG-OM1, SVG- OM2 (9/2013)    Shortness of breath     Stable angina     Status post coronary angiogram 09/21/2021    Status post coronary angiogram 09/21/2021    Unstable angina (H) 11/24/2020    Urinary retention      Current providers sharing in care for this patient include:  Patient Care Team:  Mikie Lazaro MD as PCP - General (Internal Medicine)  Mikie Lazaro MD as Assigned PCP  Armani Perez MD as Assigned Heart and Vascular Provider  Armani Perez MD as MD (Cardiovascular Disease)    The following health maintenance items are reviewed in Epic and correct as of today:  Health Maintenance   Topic Date Due    RSV VACCINE (1 - Risk 60-74 years 1-dose series) Never done    COVID-19 VACCINE (4 - 2024-25 season) 09/01/2024    CMP  07/09/2025    LIPID  07/09/2025    PSA  07/09/2025    ANNUAL REVIEW OF HM ORDERS  07/09/2025    CBC  07/09/2025    URIC ACID  07/09/2025    MEDICARE ANNUAL WELLNESS VISIT  07/09/2025    INFLUENZA VACCINE (1) 09/01/2025    COLORECTAL CANCER SCREENING  11/10/2025    FALL RISK ASSESSMENT  07/22/2026    DIABETES SCREENING  07/09/2027    ADVANCE CARE PLANNING  07/09/2029    DTAP/TDAP/TD VACCINE (3 - Td or Tdap) 10/29/2029    HEPATITIS C SCREENING  Completed    PHQ-2 (once per calendar year)  Completed    PNEUMOCOCCAL VACCINE 50+ YEARS  Completed    ZOSTER VACCINE  Completed    HPV VACCINE  Aged Out    MENINGITIS VACCINE  Aged Out         Review of Systems  Constitutional, HEENT, cardiovascular, pulmonary, GI, , musculoskeletal, neuro, skin, endocrine and psych systems are negative, except as otherwise noted.     Objective    Exam  /82 (BP Location: Left arm, Patient Position: Sitting, Cuff Size: Adult Regular)   Pulse 63   Temp 98.6  F (37  C) (Tympanic)   Resp 10   Ht 1.803 m (5' 11\")   Wt 77.6 kg (171 lb)   SpO2 99%   BMI 23.85 kg/m     Estimated body mass index is 23.85 kg/m  as calculated from the " "following:    Height as of this encounter: 1.803 m (5' 11\").    Weight as of this encounter: 77.6 kg (171 lb).    Physical Exam  GENERAL: alert and no distress  EYES: Eyes grossly normal to inspection,   HENT: ear canals and TM's normal, nose and mouth without ulcers or lesions  NECK: no adenopathy, no asymmetry, masses, or scars  RESP: lungs clear to auscultation - no rales, rhonchi or wheezes  CV: regular rate and rhythm, normal S1 S2, no S3 or S4, no murmur   ABDOMEN: soft, nontender, no hepatosplenomegaly,   MS: no gross musculoskeletal defects noted, no edema  SKIN: no suspicious lesions or rashes  NEURO: Normal strength and tone, mentation intact and speech normal  PSYCH: mentation appears normal, affect normal/bright         7/22/2025   Mini Cog   Clock Draw Score 2 Normal   3 Item Recall 3 objects recalled   Mini Cog Total Score 5         Patient Instructions   (Z00.00) Routine general medical examination at a health care facility  (primary encounter diagnosis)  Comment: For routine exam, we will draw labs as ordered, cholesterol, diabetes mellitus check, liver function, renal function, PSA and plan for colonoscopy in November [facility] [phone].  We will also update vaccination history.  Plan:     (Z13.6) Screening for cardiovascular condition  Comment: check fasting lipid panel   Plan:     (I25.10) Coronary artery disease involving native coronary artery of native heart without angina pectoris  Comment: Continue staitn, beta blocker, aspirin and follow up in cardiology   Plan: Lipid panel reflex to direct LDL Non-fasting,         CBC with Platelets            (Z12.5) Screening for prostate cancer  Comment:   Plan: PROSTATE SPEC ANTIGEN SCREEN            (M10.9) Acute gout, unspecified cause, unspecified site  Comment: Continue allopurinol and check uric acid today   Plan: COMPREHENSIVE METABOLIC PANEL, CBC with         Platelets, Uric acid            (M10.9) Acute gouty arthritis  Comment: as above "   Plan: COMPREHENSIVE METABOLIC PANEL, colchicine         (COLCRYS) 0.6 MG tablet, allopurinol (ZYLOPRIM)        100 MG tablet               Appropriate preventative services were discussed with the patient, including applicable screening as appropriate for fall preventation, nutrition, physical activity, tobacco-use cessation, weight loss and cognition.  Checklist reviewing preventive services available has been given to the patient.  Reviewed patients plan of care and provided an AVS. The Basic Care Plan (routing screening as documented in Health Maintenance) for the patient meets the Care Plan requirement.  This Care Plan has been established and reviewed with the patient. .         Signed Electronically by: Mikie Lazaro MD, MD

## (undated) DEVICE — TOTE ANGIO CORP PC15AT SAN32CC83O

## (undated) DEVICE — CATH ANGIO JUDKINS JL4 6FRX100CM INFINITI 534620T

## (undated) DEVICE — CATH GUIDING BLUE YELLOW PTFE XB3.5 6FRX100CM 67005400

## (undated) DEVICE — GUIDEWIRE VASC 0.014INX180CM RUNTHROUGH 25-1011

## (undated) DEVICE — RAD G/W INQWIRE .035X260CM J-TIP EXCHANGE IQ35F260J1O5RS

## (undated) DEVICE — CLOSURE ANGIOSEAL 6FR 610130

## (undated) DEVICE — DEFIB PRO-PADZ LVP LQD GEL ADULT 8900-2105-01

## (undated) DEVICE — INTRO GLIDESHEATH SLENDER 6FR 10X45CM 60-1060

## (undated) DEVICE — CATH ANGIO JUDKINS R4 6FRX100CM INFINITI 534621T

## (undated) DEVICE — INFL DVC KIT W/10CC NITRO IN4530

## (undated) DEVICE — CATH BALLOON APEX PUSH 8X1.50MM H7493896108150

## (undated) DEVICE — MANIFOLD KIT ANGIO AUTOMATED 014613

## (undated) DEVICE — CATH ANGIO INFINITI IM 4FRX100CM 538460

## (undated) DEVICE — CATH RX TAKERU PTCA BALLOON 1.5X12MM DC-RY1512UA1

## (undated) DEVICE — INTRODUCER CATH VASC 5FRX10CM  MPIS-501-NT-U-SST

## (undated) DEVICE — VALVE HEMOSTASIS .096" COPILOT MECH 1003331

## (undated) DEVICE — CATH GUIDELINER 6FR 5571

## (undated) DEVICE — SLEEVE TR BAND RADIAL COMPRESSION DEVICE 24CM TRB24-REG

## (undated) DEVICE — INTRO SHEATH 4FRX10CM PINNACLE RSS402

## (undated) DEVICE — CATH ANGIO SUPERTORQUE AL1 6FRX100CM 532-645

## (undated) DEVICE — Device

## (undated) DEVICE — CATH DIAG 4FR JL 4.5 538417

## (undated) DEVICE — KIT HAND CONTROL ANGIOTOUCH ACIST 65CM AT-P65

## (undated) DEVICE — CATH ANGIO SLCT 6FR DIA 100CML

## (undated) DEVICE — INTRO SHTH 10CM 5FR 45CM PNCL HYDR PHL PTFE CTD

## (undated) DEVICE — INTRO SHEATH 6FRX10CM PINNACLE RSS602

## (undated) DEVICE — MEDITRACE MULTIFUNTION ADULT RADIOTRANSPARENT ELECTRODE FOR ZOLL

## (undated) DEVICE — CATH ANGIO INFINITI JR4 4FRX100CM 538421

## (undated) DEVICE — SYR ANGIOGRAPHY MULTIUSE KIT ACIST 014612

## (undated) DEVICE — CATH ANGIO INFINITI MPA2 4FRX100CM 2 SH 538442

## (undated) RX ORDER — HEPARIN SODIUM 1000 [USP'U]/ML
INJECTION, SOLUTION INTRAVENOUS; SUBCUTANEOUS
Status: DISPENSED
Start: 2021-09-21

## (undated) RX ORDER — VERAPAMIL HYDROCHLORIDE 2.5 MG/ML
INJECTION, SOLUTION INTRAVENOUS
Status: DISPENSED
Start: 2020-11-24

## (undated) RX ORDER — LIDOCAINE HYDROCHLORIDE 10 MG/ML
INJECTION, SOLUTION EPIDURAL; INFILTRATION; INTRACAUDAL; PERINEURAL
Status: DISPENSED
Start: 2020-11-24

## (undated) RX ORDER — HEPARIN SODIUM 1000 [USP'U]/ML
INJECTION, SOLUTION INTRAVENOUS; SUBCUTANEOUS
Status: DISPENSED
Start: 2020-11-24

## (undated) RX ORDER — FENTANYL CITRATE 50 UG/ML
INJECTION, SOLUTION INTRAMUSCULAR; INTRAVENOUS
Status: DISPENSED
Start: 2021-09-21

## (undated) RX ORDER — LIDOCAINE HYDROCHLORIDE 10 MG/ML
INJECTION, SOLUTION EPIDURAL; INFILTRATION; INTRACAUDAL; PERINEURAL
Status: DISPENSED
Start: 2021-09-21

## (undated) RX ORDER — FENTANYL CITRATE 50 UG/ML
INJECTION, SOLUTION INTRAMUSCULAR; INTRAVENOUS
Status: DISPENSED
Start: 2020-11-24

## (undated) RX ORDER — VERAPAMIL HYDROCHLORIDE 2.5 MG/ML
INJECTION, SOLUTION INTRAVENOUS
Status: DISPENSED
Start: 2021-09-21

## (undated) RX ORDER — FENTANYL CITRATE-0.9 % NACL/PF 10 MCG/ML
PLASTIC BAG, INJECTION (ML) INTRAVENOUS
Status: DISPENSED
Start: 2021-09-21

## (undated) RX ORDER — NITROGLYCERIN 5 MG/ML
VIAL (ML) INTRAVENOUS
Status: DISPENSED
Start: 2021-09-21